# Patient Record
Sex: MALE | Race: WHITE | NOT HISPANIC OR LATINO | Employment: OTHER | URBAN - NONMETROPOLITAN AREA
[De-identification: names, ages, dates, MRNs, and addresses within clinical notes are randomized per-mention and may not be internally consistent; named-entity substitution may affect disease eponyms.]

---

## 2017-01-10 ENCOUNTER — TELEPHONE (OUTPATIENT)
Dept: FAMILY MEDICINE CLINIC | Facility: CLINIC | Age: 67
End: 2017-01-10

## 2017-01-13 RX ORDER — COLCHICINE 0.6 MG/1
TABLET ORAL
Qty: 30 TABLET | Refills: 6 | Status: SHIPPED | OUTPATIENT
Start: 2017-01-13 | End: 2017-01-20 | Stop reason: SDUPTHER

## 2017-01-20 DIAGNOSIS — M10.9 GOUT OF FOOT, UNSPECIFIED CAUSE, UNSPECIFIED CHRONICITY, UNSPECIFIED LATERALITY: Primary | ICD-10-CM

## 2017-01-20 RX ORDER — COLCHICINE 0.6 MG/1
TABLET ORAL
Qty: 30 TABLET | OUTPATIENT
Start: 2017-01-20

## 2017-01-20 RX ORDER — COLCHICINE 0.6 MG/1
0.6 TABLET ORAL DAILY
Qty: 30 TABLET | Refills: 6 | Status: SHIPPED | OUTPATIENT
Start: 2017-01-20 | End: 2017-03-31 | Stop reason: SDUPTHER

## 2017-01-30 RX ORDER — ATORVASTATIN CALCIUM 20 MG/1
TABLET, FILM COATED ORAL
Qty: 30 TABLET | Refills: 0 | Status: SHIPPED | OUTPATIENT
Start: 2017-01-30 | End: 2017-02-27 | Stop reason: SDUPTHER

## 2017-02-02 ENCOUNTER — TELEPHONE (OUTPATIENT)
Dept: ENDOCRINOLOGY | Age: 67
End: 2017-02-02

## 2017-02-02 RX ORDER — LISINOPRIL 10 MG/1
TABLET ORAL
Qty: 30 TABLET | Refills: 0 | Status: SHIPPED | OUTPATIENT
Start: 2017-02-02 | End: 2017-03-06 | Stop reason: SDUPTHER

## 2017-02-02 NOTE — TELEPHONE ENCOUNTER
Called in the pa for onglyza and is approved through 12/31/2017. The pt is aware of this information.

## 2017-02-21 RX ORDER — RIVAROXABAN 20 MG/1
TABLET, FILM COATED ORAL
Qty: 30 TABLET | Refills: 0 | Status: SHIPPED | OUTPATIENT
Start: 2017-02-21 | End: 2017-03-21 | Stop reason: SDUPTHER

## 2017-02-21 RX ORDER — AMLODIPINE BESYLATE 10 MG/1
TABLET ORAL
Qty: 30 TABLET | Refills: 0 | Status: SHIPPED | OUTPATIENT
Start: 2017-02-21 | End: 2017-03-21 | Stop reason: SDUPTHER

## 2017-02-27 RX ORDER — ATORVASTATIN CALCIUM 20 MG/1
TABLET, FILM COATED ORAL
Qty: 30 TABLET | Refills: 0 | Status: SHIPPED | OUTPATIENT
Start: 2017-02-27 | End: 2017-03-31 | Stop reason: SDUPTHER

## 2017-02-28 ENCOUNTER — TELEPHONE (OUTPATIENT)
Dept: FAMILY MEDICINE CLINIC | Facility: CLINIC | Age: 67
End: 2017-02-28

## 2017-02-28 NOTE — TELEPHONE ENCOUNTER
Coco called in regards for patient today. He was taking off his Omeprazole medication by his Nephrologist because it could have affected his kidney function. He has been off of it for about a month now. Patient has been belching a lot more now and she describes him as having a cough pretty much every time he takes a bite of something. They want to know if there is something else that could be given to him for this or if you know of anything he could take? Coco would like a call back at [phone].

## 2017-02-28 NOTE — TELEPHONE ENCOUNTER
I would call his nephrologist and ask him if he knows of a kidney friendly and acid for diabetics with low kidney function.

## 2017-02-28 NOTE — TELEPHONE ENCOUNTER
Coco was informed. She states that she will call and ask him and if she has any troubles she will call back and let us know.

## 2017-03-02 ENCOUNTER — TELEPHONE (OUTPATIENT)
Dept: ENDOCRINOLOGY | Age: 67
End: 2017-03-02

## 2017-03-06 RX ORDER — LISINOPRIL 10 MG/1
TABLET ORAL
Qty: 30 TABLET | Refills: 0 | Status: SHIPPED | OUTPATIENT
Start: 2017-03-06 | End: 2017-03-31 | Stop reason: SDUPTHER

## 2017-03-21 RX ORDER — RIVAROXABAN 20 MG/1
TABLET, FILM COATED ORAL
Qty: 30 TABLET | Refills: 0 | Status: SHIPPED | OUTPATIENT
Start: 2017-03-21 | End: 2017-03-31 | Stop reason: SDUPTHER

## 2017-03-21 RX ORDER — AMLODIPINE BESYLATE 10 MG/1
TABLET ORAL
Qty: 30 TABLET | Refills: 0 | Status: SHIPPED | OUTPATIENT
Start: 2017-03-21 | End: 2017-03-31 | Stop reason: SDUPTHER

## 2017-03-31 ENCOUNTER — TELEPHONE (OUTPATIENT)
Dept: FAMILY MEDICINE CLINIC | Facility: CLINIC | Age: 67
End: 2017-03-31

## 2017-03-31 ENCOUNTER — OFFICE VISIT (OUTPATIENT)
Dept: FAMILY MEDICINE CLINIC | Facility: CLINIC | Age: 67
End: 2017-03-31

## 2017-03-31 VITALS
WEIGHT: 258.2 LBS | DIASTOLIC BLOOD PRESSURE: 54 MMHG | TEMPERATURE: 97.6 F | OXYGEN SATURATION: 96 % | HEIGHT: 70 IN | BODY MASS INDEX: 36.97 KG/M2 | SYSTOLIC BLOOD PRESSURE: 142 MMHG | HEART RATE: 66 BPM

## 2017-03-31 DIAGNOSIS — Z86.718 HISTORY OF DVT (DEEP VEIN THROMBOSIS): ICD-10-CM

## 2017-03-31 DIAGNOSIS — I10 ESSENTIAL HYPERTENSION: ICD-10-CM

## 2017-03-31 DIAGNOSIS — E78.5 HYPERLIPIDEMIA, UNSPECIFIED HYPERLIPIDEMIA TYPE: ICD-10-CM

## 2017-03-31 DIAGNOSIS — Z00.00 HEALTH CARE MAINTENANCE: Primary | ICD-10-CM

## 2017-03-31 DIAGNOSIS — M10.9 GOUT OF FOOT, UNSPECIFIED CAUSE, UNSPECIFIED CHRONICITY, UNSPECIFIED LATERALITY: ICD-10-CM

## 2017-03-31 PROCEDURE — 99213 OFFICE O/P EST LOW 20 MIN: CPT | Performed by: NURSE PRACTITIONER

## 2017-03-31 RX ORDER — AMLODIPINE BESYLATE 10 MG/1
10 TABLET ORAL DAILY
Qty: 30 TABLET | Refills: 6 | Status: SHIPPED | OUTPATIENT
Start: 2017-03-31 | End: 2017-11-20 | Stop reason: SDUPTHER

## 2017-03-31 RX ORDER — COLCHICINE 0.6 MG/1
0.6 TABLET ORAL DAILY
Qty: 30 TABLET | Refills: 6 | Status: SHIPPED | OUTPATIENT
Start: 2017-03-31 | End: 2018-01-23 | Stop reason: SDUPTHER

## 2017-03-31 RX ORDER — GENTAMICIN SULFATE 1 MG/G
CREAM TOPICAL AS NEEDED
COMMUNITY
Start: 2017-02-02 | End: 2017-11-27

## 2017-03-31 RX ORDER — LISINOPRIL 10 MG/1
10 TABLET ORAL DAILY
Qty: 30 TABLET | Refills: 0 | Status: SHIPPED | OUTPATIENT
Start: 2017-03-31 | End: 2017-05-05 | Stop reason: SDUPTHER

## 2017-03-31 RX ORDER — ATORVASTATIN CALCIUM 20 MG/1
20 TABLET, FILM COATED ORAL DAILY
Qty: 30 TABLET | Refills: 6 | Status: SHIPPED | OUTPATIENT
Start: 2017-03-31 | End: 2017-10-27 | Stop reason: SDUPTHER

## 2017-03-31 NOTE — TELEPHONE ENCOUNTER
----- Message from DONNIE Thao sent at 3/31/2017 10:12 AM EDT -----  Please check in all scripts and see when Mr. Torres last colonoscopy was he feels it is within the last 7 years.

## 2017-03-31 NOTE — TELEPHONE ENCOUNTER
----- Message from Bryan Wood sent at 3/31/2017 11:52 AM EDT -----  BEATRICE TITUS PATIENT HAD HIS LAST COLONOSCOPY ON 3/4/2013 @ [facility] BY DR. RAMIREZ.

## 2017-03-31 NOTE — TELEPHONE ENCOUNTER
Do you by chance know around when it was done? I could not find anything in [employer] regarding this.

## 2017-03-31 NOTE — PROGRESS NOTES
Subjective   Javier Torres is a 67 y.o. male. Patient is being seen today for medication check/refill for Atorvastatin, Amlodipine, Colchicine, Lisinopril, and Xarelto. He is not fasting today.     History of Present Illness 67 yr old white male here today to review long term meds for statin for dyslipidemia, amlodipine for blood pressure, colchicine for gout, lisinopril for blood pressure and several toe for anticoagulation therapy for history of DVT. Nephrology discontinued omeprazole in place patient on Pepcid. Patient states all other medications are working well and offers no problems.    The following portions of the patient's history were reviewed and updated as appropriate: allergies, current medications, past family history, past medical history, past social history, past surgical history and problem list.    Review of Systems   Cardiovascular:        Hyperlipidemia.  Hypertension.     Gastrointestinal:        Gout.   Hematological:        DVT.   All other systems reviewed and are negative.      Objective   Physical Exam   Constitutional: He is oriented to person, place, and time. He appears well-developed and well-nourished.   HENT:   Head: Normocephalic and atraumatic.   Eyes: EOM are normal. Pupils are equal, round, and reactive to light.   Neck: Normal range of motion.   Cardiovascular: Normal rate and regular rhythm.    Pulmonary/Chest: Effort normal and breath sounds normal.   Abdominal: Soft. Bowel sounds are normal.   Neurological: He is alert and oriented to person, place, and time.   Skin: Skin is warm and dry.   Psychiatric: He has a normal mood and affect. His behavior is normal. Judgment and thought content normal.   Nursing note and vitals reviewed.      Assessment/Plan   Javier was seen today for med refill and labs only.    Diagnoses and all orders for this visit:    Health care maintenance  -     Hepatitis C Antibody    Gout of foot, unspecified cause, unspecified chronicity, unspecified  laterality  -     colchicine 0.6 MG tablet; Take 1 tablet by mouth Daily.  -     amLODIPine (NORVASC) 10 MG tablet; Take 1 tablet by mouth Daily.  -     aspirin 81 MG tablet; Take 1 tablet by mouth Daily.  -     atorvastatin (LIPITOR) 20 MG tablet; Take 1 tablet by mouth Daily.  -     lisinopril (PRINIVIL,ZESTRIL) 10 MG tablet; Take 1 tablet by mouth Daily.  -     rivaroxaban (XARELTO) 20 MG tablet; Take 1 tablet by mouth Daily With Dinner.    Essential hypertension    Hyperlipidemia, unspecified hyperlipidemia type    History of DVT (deep vein thrombosis)     This office was prepared to draw labs on the patient, but between endocrinology and nephrology all of his routine labs are current. He will be returning to nephrology in May where more labs will be drawn. Reviewed need for vaccines to be given at outside pharmacy for Medicare to cover. Patient aware of need for Medicare adult wellness checkup. Hepatitis C and PSA will be drawn by this office today results will be called once known.

## 2017-04-01 LAB
HCV AB S/CO SERPL IA: 0.1 S/CO RATIO (ref 0–0.9)
PSA SERPL-MCNC: 1.04 NG/ML (ref 0–4)

## 2017-04-13 ENCOUNTER — TELEPHONE (OUTPATIENT)
Dept: FAMILY MEDICINE CLINIC | Facility: CLINIC | Age: 67
End: 2017-04-13

## 2017-04-13 RX ORDER — INSULIN DETEMIR 100 [IU]/ML
INJECTION, SOLUTION SUBCUTANEOUS
Qty: 15 ML | OUTPATIENT
Start: 2017-04-13

## 2017-04-13 NOTE — TELEPHONE ENCOUNTER
Refill request for Levemir Flextouch U-100 was faxed to us from [facility]. Sig: Inject 12 units at bedtime as directed, Quantity: 15, Refills: 0. Do you control? If so, okay to refill?

## 2017-04-13 NOTE — TELEPHONE ENCOUNTER
I have done spoken to Coco about this and she is going to talk to the pharmacy because they should be sending this to his Endocrinologist.

## 2017-04-13 NOTE — TELEPHONE ENCOUNTER
He has an endocrinologist. I do not mind refilling it for one month but he really should be going through endocrinology

## 2017-04-13 NOTE — TELEPHONE ENCOUNTER
Spoke to Coco for patient. She said that the pharmacy should have sent it to his Endocrinologist. She said that she will call them and have them fax it to Endo.

## 2017-04-21 RX ORDER — AMLODIPINE BESYLATE 10 MG/1
TABLET ORAL
Qty: 30 TABLET | OUTPATIENT
Start: 2017-04-21

## 2017-04-21 RX ORDER — SAXAGLIPTIN 2.5 MG/1
TABLET, FILM COATED ORAL
Qty: 30 TABLET | OUTPATIENT
Start: 2017-04-21

## 2017-04-21 RX ORDER — SAXAGLIPTIN 2.5 MG/1
TABLET, FILM COATED ORAL
Qty: 30 TABLET | Refills: 5 | Status: SHIPPED | OUTPATIENT
Start: 2017-04-21 | End: 2017-05-05 | Stop reason: SDUPTHER

## 2017-04-21 RX ORDER — RIVAROXABAN 20 MG/1
TABLET, FILM COATED ORAL
Qty: 30 TABLET | Refills: 0 | Status: SHIPPED | OUTPATIENT
Start: 2017-04-21 | End: 2017-05-05 | Stop reason: SDUPTHER

## 2017-04-26 ENCOUNTER — TELEPHONE (OUTPATIENT)
Dept: FAMILY MEDICINE CLINIC | Facility: CLINIC | Age: 67
End: 2017-04-26

## 2017-04-26 NOTE — TELEPHONE ENCOUNTER
Received a phone call from [facility] stated they take care of his hearing cheeks and they needs a new script for Audiology consult and treat with diagnosis H90.3 code and faxed to [phone], phone # if any questions [phone]

## 2017-05-05 ENCOUNTER — OFFICE VISIT (OUTPATIENT)
Dept: FAMILY MEDICINE CLINIC | Facility: CLINIC | Age: 67
End: 2017-05-05

## 2017-05-05 VITALS
WEIGHT: 257.8 LBS | TEMPERATURE: 97.9 F | HEIGHT: 70 IN | OXYGEN SATURATION: 97 % | DIASTOLIC BLOOD PRESSURE: 58 MMHG | BODY MASS INDEX: 36.91 KG/M2 | HEART RATE: 63 BPM | SYSTOLIC BLOOD PRESSURE: 122 MMHG

## 2017-05-05 DIAGNOSIS — M25.562 ACUTE PAIN OF LEFT KNEE: Primary | ICD-10-CM

## 2017-05-05 DIAGNOSIS — M10.9 GOUT OF FOOT, UNSPECIFIED CAUSE, UNSPECIFIED CHRONICITY, UNSPECIFIED LATERALITY: ICD-10-CM

## 2017-05-05 PROCEDURE — 99214 OFFICE O/P EST MOD 30 MIN: CPT | Performed by: NURSE PRACTITIONER

## 2017-05-05 PROCEDURE — 73560 X-RAY EXAM OF KNEE 1 OR 2: CPT | Performed by: NURSE PRACTITIONER

## 2017-05-05 RX ORDER — LISINOPRIL 10 MG/1
10 TABLET ORAL DAILY
Qty: 30 TABLET | Refills: 6 | Status: SHIPPED | OUTPATIENT
Start: 2017-05-05 | End: 2017-11-27

## 2017-05-05 RX ORDER — CLINDAMYCIN HYDROCHLORIDE 300 MG/1
CAPSULE ORAL
COMMUNITY
Start: 2017-04-13 | End: 2017-05-05

## 2017-05-11 ENCOUNTER — TELEPHONE (OUTPATIENT)
Dept: FAMILY MEDICINE CLINIC | Facility: CLINIC | Age: 67
End: 2017-05-11

## 2017-05-22 RX ORDER — RIVAROXABAN 20 MG/1
TABLET, FILM COATED ORAL
Qty: 30 TABLET | Refills: 2 | Status: SHIPPED | OUTPATIENT
Start: 2017-05-22 | End: 2017-05-23

## 2017-05-23 ENCOUNTER — OFFICE VISIT (OUTPATIENT)
Dept: ENDOCRINOLOGY | Age: 67
End: 2017-05-23

## 2017-05-23 VITALS
SYSTOLIC BLOOD PRESSURE: 142 MMHG | OXYGEN SATURATION: 97 % | HEIGHT: 70 IN | BODY MASS INDEX: 37.25 KG/M2 | HEART RATE: 55 BPM | DIASTOLIC BLOOD PRESSURE: 60 MMHG | WEIGHT: 260.2 LBS

## 2017-05-23 DIAGNOSIS — I10 ESSENTIAL HYPERTENSION: Primary | ICD-10-CM

## 2017-05-23 DIAGNOSIS — E11.21 TYPE 2 DIABETES WITH NEPHROPATHY (HCC): ICD-10-CM

## 2017-05-23 DIAGNOSIS — E78.5 HYPERLIPIDEMIA, UNSPECIFIED HYPERLIPIDEMIA TYPE: ICD-10-CM

## 2017-05-23 DIAGNOSIS — N18.9 CKD (CHRONIC KIDNEY DISEASE), UNSPECIFIED STAGE: ICD-10-CM

## 2017-05-23 DIAGNOSIS — E55.9 VITAMIN D DEFICIENCY: ICD-10-CM

## 2017-05-23 PROCEDURE — 99214 OFFICE O/P EST MOD 30 MIN: CPT | Performed by: INTERNAL MEDICINE

## 2017-05-25 ENCOUNTER — OFFICE VISIT (OUTPATIENT)
Dept: FAMILY MEDICINE CLINIC | Facility: CLINIC | Age: 67
End: 2017-05-25

## 2017-05-25 VITALS
DIASTOLIC BLOOD PRESSURE: 68 MMHG | BODY MASS INDEX: 37.25 KG/M2 | OXYGEN SATURATION: 97 % | TEMPERATURE: 98.6 F | WEIGHT: 260.2 LBS | HEART RATE: 72 BPM | SYSTOLIC BLOOD PRESSURE: 138 MMHG | HEIGHT: 70 IN

## 2017-05-25 DIAGNOSIS — E11.40 TYPE 2 DIABETES MELLITUS WITH DIABETIC NEUROPATHY, WITH LONG-TERM CURRENT USE OF INSULIN (HCC): ICD-10-CM

## 2017-05-25 DIAGNOSIS — F70 MILD INTELLECTUAL DISABILITY: ICD-10-CM

## 2017-05-25 DIAGNOSIS — Z79.4 TYPE 2 DIABETES MELLITUS WITH DIABETIC NEUROPATHY, WITH LONG-TERM CURRENT USE OF INSULIN (HCC): ICD-10-CM

## 2017-05-25 DIAGNOSIS — Z00.00 MEDICARE ANNUAL WELLNESS VISIT, SUBSEQUENT: Primary | ICD-10-CM

## 2017-05-25 LAB
ALBUMIN SERPL-MCNC: 4.4 G/DL (ref 3.5–5.2)
ALBUMIN/GLOB SERPL: 1.7 G/DL
ALP SERPL-CCNC: 52 U/L (ref 39–117)
ALT SERPL-CCNC: 35 U/L (ref 1–41)
AST SERPL-CCNC: 19 U/L (ref 1–40)
BILIRUB SERPL-MCNC: 0.3 MG/DL (ref 0.1–1.2)
BUN SERPL-MCNC: 42 MG/DL (ref 8–23)
BUN/CREAT SERPL: 19.6 (ref 7–25)
C PEPTIDE SERPL-MCNC: 15.9 NG/ML (ref 1.1–4.4)
CALCIUM SERPL-MCNC: 9.9 MG/DL (ref 8.6–10.5)
CHLORIDE SERPL-SCNC: 97 MMOL/L (ref 98–107)
CHOLEST SERPL-MCNC: 175 MG/DL (ref 0–200)
CO2 SERPL-SCNC: 22.8 MMOL/L (ref 22–29)
CREAT SERPL-MCNC: 2.14 MG/DL (ref 0.76–1.27)
EXPIRATION DATE: ABNORMAL
GAD65 AB SER IA-ACNC: <5 U/ML (ref 0–5)
GLOBULIN SER CALC-MCNC: 2.6 GM/DL
GLUCOSE SERPL-MCNC: 289 MG/DL (ref 65–99)
HBA1C MFR BLD: 10.23 % (ref 4.8–5.6)
HBA1C MFR BLD: 11.3 %
HDLC SERPL-MCNC: 38 MG/DL (ref 40–60)
LDLC SERPL CALC-MCNC: 96 MG/DL (ref 0–100)
Lab: 710
POTASSIUM SERPL-SCNC: 5.2 MMOL/L (ref 3.5–5.2)
PROT SERPL-MCNC: 7 G/DL (ref 6–8.5)
SODIUM SERPL-SCNC: 133 MMOL/L (ref 136–145)
TRIGL SERPL-MCNC: 207 MG/DL (ref 0–150)
VLDLC SERPL CALC-MCNC: 41.4 MG/DL (ref 5–40)

## 2017-05-25 PROCEDURE — 83036 HEMOGLOBIN GLYCOSYLATED A1C: CPT | Performed by: NURSE PRACTITIONER

## 2017-05-25 PROCEDURE — G0438 PPPS, INITIAL VISIT: HCPCS | Performed by: NURSE PRACTITIONER

## 2017-05-26 ENCOUNTER — TELEPHONE (OUTPATIENT)
Dept: ENDOCRINOLOGY | Age: 67
End: 2017-05-26

## 2017-05-31 ENCOUNTER — OFFICE VISIT (OUTPATIENT)
Dept: ORTHOPEDIC SURGERY | Facility: CLINIC | Age: 67
End: 2017-05-31

## 2017-05-31 VITALS — BODY MASS INDEX: 36.99 KG/M2 | WEIGHT: 258.4 LBS | HEIGHT: 70 IN | TEMPERATURE: 97.8 F

## 2017-05-31 DIAGNOSIS — M17.12 OSTEOARTHROSIS, LOCALIZED, PRIMARY, KNEE, LEFT: Primary | ICD-10-CM

## 2017-05-31 PROCEDURE — 99204 OFFICE O/P NEW MOD 45 MIN: CPT | Performed by: ORTHOPAEDIC SURGERY

## 2017-05-31 PROCEDURE — 73562 X-RAY EXAM OF KNEE 3: CPT | Performed by: ORTHOPAEDIC SURGERY

## 2017-05-31 PROCEDURE — 20610 DRAIN/INJ JOINT/BURSA W/O US: CPT | Performed by: ORTHOPAEDIC SURGERY

## 2017-05-31 RX ORDER — METHYLPREDNISOLONE ACETATE 80 MG/ML
80 INJECTION, SUSPENSION INTRA-ARTICULAR; INTRALESIONAL; INTRAMUSCULAR; SOFT TISSUE
Status: COMPLETED | OUTPATIENT
Start: 2017-05-31 | End: 2017-05-31

## 2017-05-31 RX ORDER — LIDOCAINE HYDROCHLORIDE 10 MG/ML
2 INJECTION, SOLUTION INFILTRATION; PERINEURAL
Status: COMPLETED | OUTPATIENT
Start: 2017-05-31 | End: 2017-05-31

## 2017-05-31 RX ORDER — BUPIVACAINE HYDROCHLORIDE 5 MG/ML
2 INJECTION, SOLUTION PERINEURAL
Status: COMPLETED | OUTPATIENT
Start: 2017-05-31 | End: 2017-05-31

## 2017-05-31 RX ADMIN — METHYLPREDNISOLONE ACETATE 80 MG: 80 INJECTION, SUSPENSION INTRA-ARTICULAR; INTRALESIONAL; INTRAMUSCULAR; SOFT TISSUE at 08:49

## 2017-05-31 RX ADMIN — LIDOCAINE HYDROCHLORIDE 2 ML: 10 INJECTION, SOLUTION INFILTRATION; PERINEURAL at 08:49

## 2017-05-31 RX ADMIN — BUPIVACAINE HYDROCHLORIDE 2 ML: 5 INJECTION, SOLUTION PERINEURAL at 08:49

## 2017-06-16 RX ORDER — NATEGLINIDE 120 MG/1
TABLET ORAL
Qty: 90 TABLET | Refills: 5 | Status: SHIPPED | OUTPATIENT
Start: 2017-06-16 | End: 2018-01-06 | Stop reason: SDUPTHER

## 2017-06-22 PROBLEM — D36.17 NEUROFIBROMA OF TRUNK: Status: ACTIVE | Noted: 2017-06-22

## 2017-06-27 RX ORDER — SAXAGLIPTIN 2.5 MG/1
TABLET, FILM COATED ORAL
COMMUNITY
Start: 2017-05-25 | End: 2017-06-27 | Stop reason: CLARIF

## 2017-08-18 RX ORDER — COLCHICINE 0.6 MG/1
TABLET ORAL
Qty: 30 TABLET | Refills: 1 | Status: SHIPPED | OUTPATIENT
Start: 2017-08-18 | End: 2017-10-18 | Stop reason: SDUPTHER

## 2017-08-18 RX ORDER — RIVAROXABAN 20 MG/1
TABLET, FILM COATED ORAL
Qty: 30 TABLET | Refills: 1 | Status: SHIPPED | OUTPATIENT
Start: 2017-08-18 | End: 2017-10-18 | Stop reason: SDUPTHER

## 2017-10-18 RX ORDER — SAXAGLIPTIN 2.5 MG/1
TABLET, FILM COATED ORAL
Qty: 30 TABLET | OUTPATIENT
Start: 2017-10-18

## 2017-10-19 RX ORDER — COLCHICINE 0.6 MG/1
TABLET ORAL
Qty: 30 TABLET | Refills: 6 | Status: SHIPPED | OUTPATIENT
Start: 2017-10-19 | End: 2017-11-27 | Stop reason: SDUPTHER

## 2017-10-19 RX ORDER — RIVAROXABAN 20 MG/1
TABLET, FILM COATED ORAL
Qty: 30 TABLET | Refills: 6 | Status: SHIPPED | OUTPATIENT
Start: 2017-10-19 | End: 2017-11-27 | Stop reason: SDUPTHER

## 2017-10-27 DIAGNOSIS — M10.9 GOUT OF FOOT, UNSPECIFIED CAUSE, UNSPECIFIED CHRONICITY, UNSPECIFIED LATERALITY: ICD-10-CM

## 2017-10-27 RX ORDER — ATORVASTATIN CALCIUM 20 MG/1
TABLET, FILM COATED ORAL
Qty: 30 TABLET | Refills: 0 | Status: SHIPPED | OUTPATIENT
Start: 2017-10-27 | End: 2017-11-27 | Stop reason: SDUPTHER

## 2017-11-20 DIAGNOSIS — M10.9 GOUT OF FOOT, UNSPECIFIED CAUSE, UNSPECIFIED CHRONICITY, UNSPECIFIED LATERALITY: ICD-10-CM

## 2017-11-20 RX ORDER — AMLODIPINE BESYLATE 10 MG/1
TABLET ORAL
Qty: 30 TABLET | Refills: 0 | Status: SHIPPED | OUTPATIENT
Start: 2017-11-20 | End: 2017-12-21 | Stop reason: SDUPTHER

## 2017-11-26 DIAGNOSIS — M10.9 GOUT OF FOOT, UNSPECIFIED CAUSE, UNSPECIFIED CHRONICITY, UNSPECIFIED LATERALITY: ICD-10-CM

## 2017-11-27 ENCOUNTER — OFFICE VISIT (OUTPATIENT)
Dept: ENDOCRINOLOGY | Age: 67
End: 2017-11-27

## 2017-11-27 VITALS
BODY MASS INDEX: 37.45 KG/M2 | DIASTOLIC BLOOD PRESSURE: 70 MMHG | HEART RATE: 71 BPM | SYSTOLIC BLOOD PRESSURE: 152 MMHG | HEIGHT: 70 IN | OXYGEN SATURATION: 98 % | WEIGHT: 261.6 LBS

## 2017-11-27 DIAGNOSIS — Z23 NEED FOR IMMUNIZATION AGAINST INFLUENZA: Primary | ICD-10-CM

## 2017-11-27 DIAGNOSIS — M10.9 GOUT OF FOOT, UNSPECIFIED CAUSE, UNSPECIFIED CHRONICITY, UNSPECIFIED LATERALITY: ICD-10-CM

## 2017-11-27 DIAGNOSIS — E11.21 TYPE 2 DIABETES WITH NEPHROPATHY (HCC): Primary | ICD-10-CM

## 2017-11-27 DIAGNOSIS — F70 MILD INTELLECTUAL DISABILITY: ICD-10-CM

## 2017-11-27 DIAGNOSIS — E55.9 VITAMIN D DEFICIENCY: ICD-10-CM

## 2017-11-27 DIAGNOSIS — I10 ESSENTIAL HYPERTENSION: ICD-10-CM

## 2017-11-27 DIAGNOSIS — N18.30 CHRONIC KIDNEY DISEASE, STAGE III (MODERATE) (HCC): ICD-10-CM

## 2017-11-27 DIAGNOSIS — Z23 NEED FOR IMMUNIZATION AGAINST INFLUENZA: ICD-10-CM

## 2017-11-27 DIAGNOSIS — E78.5 DYSLIPIDEMIA: ICD-10-CM

## 2017-11-27 LAB
25(OH)D3+25(OH)D2 SERPL-MCNC: 45.1 NG/ML (ref 30–100)
ALBUMIN SERPL-MCNC: 4.6 G/DL (ref 3.5–5.2)
ALBUMIN/GLOB SERPL: 1.6 G/DL
ALP SERPL-CCNC: 57 U/L (ref 39–117)
ALT SERPL-CCNC: 34 U/L (ref 1–41)
AST SERPL-CCNC: 25 U/L (ref 1–40)
BILIRUB SERPL-MCNC: 0.3 MG/DL (ref 0.1–1.2)
BUN SERPL-MCNC: 34 MG/DL (ref 8–23)
BUN/CREAT SERPL: 17.2 (ref 7–25)
CALCIUM SERPL-MCNC: 9.9 MG/DL (ref 8.6–10.5)
CHLORIDE SERPL-SCNC: 103 MMOL/L (ref 98–107)
CHOLEST SERPL-MCNC: 162 MG/DL (ref 0–200)
CO2 SERPL-SCNC: 23.1 MMOL/L (ref 22–29)
CREAT SERPL-MCNC: 1.98 MG/DL (ref 0.76–1.27)
GFR SERPLBLD CREATININE-BSD FMLA CKD-EPI: 34 ML/MIN/1.73
GFR SERPLBLD CREATININE-BSD FMLA CKD-EPI: 41 ML/MIN/1.73
GLOBULIN SER CALC-MCNC: 2.8 GM/DL
GLUCOSE SERPL-MCNC: 209 MG/DL (ref 65–99)
HBA1C MFR BLD: 9.05 % (ref 4.8–5.6)
HDLC SERPL-MCNC: 42 MG/DL (ref 40–60)
LDLC SERPL CALC-MCNC: 90 MG/DL (ref 0–100)
POTASSIUM SERPL-SCNC: 5 MMOL/L (ref 3.5–5.2)
PROT SERPL-MCNC: 7.4 G/DL (ref 6–8.5)
SODIUM SERPL-SCNC: 140 MMOL/L (ref 136–145)
T4 FREE SERPL-MCNC: 1.04 NG/DL (ref 0.93–1.7)
TRIGL SERPL-MCNC: 151 MG/DL (ref 0–150)
TSH SERPL DL<=0.005 MIU/L-ACNC: 3.07 MIU/ML (ref 0.27–4.2)
VLDLC SERPL CALC-MCNC: 30.2 MG/DL (ref 5–40)

## 2017-11-27 PROCEDURE — 99214 OFFICE O/P EST MOD 30 MIN: CPT | Performed by: INTERNAL MEDICINE

## 2017-11-27 PROCEDURE — G0008 ADMIN INFLUENZA VIRUS VAC: HCPCS | Performed by: INTERNAL MEDICINE

## 2017-11-27 PROCEDURE — 90662 IIV NO PRSV INCREASED AG IM: CPT | Performed by: INTERNAL MEDICINE

## 2017-11-27 RX ORDER — SAXAGLIPTIN 2.5 MG/1
TABLET, FILM COATED ORAL
COMMUNITY
Start: 2017-09-21 | End: 2017-11-27

## 2017-11-27 RX ORDER — ATORVASTATIN CALCIUM 20 MG/1
TABLET, FILM COATED ORAL
Qty: 30 TABLET | Refills: 0 | Status: CANCELLED | OUTPATIENT
Start: 2017-11-27

## 2017-11-27 RX ORDER — ATORVASTATIN CALCIUM 20 MG/1
20 TABLET, FILM COATED ORAL NIGHTLY
Qty: 30 TABLET | Refills: 0 | Status: SHIPPED | OUTPATIENT
Start: 2017-11-27 | End: 2017-12-27 | Stop reason: SDUPTHER

## 2017-11-27 RX ORDER — MELATONIN 10 MG
1 TABLET, SUBLINGUAL SUBLINGUAL
COMMUNITY
End: 2019-10-21 | Stop reason: DRUGHIGH

## 2017-11-27 RX ORDER — RANITIDINE 150 MG/1
150 TABLET ORAL NIGHTLY
COMMUNITY
End: 2018-01-23 | Stop reason: SDUPTHER

## 2017-11-27 RX ORDER — LISINOPRIL 20 MG/1
20 TABLET ORAL DAILY
Qty: 30 TABLET | Refills: 5 | Status: SHIPPED | OUTPATIENT
Start: 2017-11-27 | End: 2018-07-02 | Stop reason: ALTCHOICE

## 2017-11-27 NOTE — PROGRESS NOTES
Subjective   Javier Torres is a 67 y.o. male.     HPI Comments: F/u for dm2, vitamin d def, chronic kidney disease, hyperlipidemia, hypertension / testing bs 3 x day / last dm eye exam 7/14/16 with dr Ludwig / last dm foot exam 5/23/17 with dr Meyers     Diabetes     Chronic Kidney Disease   Associated symptoms include joint swelling (knees).   Hyperlipidemia     Hypertension          Patient has known diabetes mellitus for many years. He was started on insulin in 2014. He has been on Levemir 20 units at bedtime, Starlix 120 mg 3 times a day with meals, and Januvia 50 mg/day. Fasting blood sugar runs 168-275.  Lunchtime blood sugar runs between 154-283.  Suppertime blood sugars runs between 191-303.  He eats out with family 2-3 times a week. He normally eats lunch 3 days a week at a [employer] center. He eats breakfast and supper at home. He denies any severe hypoglycemic episodes.  He has gained 3 pounds since 5/17.    He lives by himself and a cousin lives across the street. His last meal was last night.      His last eye examination was in 2017.   He has no history of retinopathy. He has chronic kidney disease and vitamin D deficiency and last saw Dr. Rubin in 2017.  He is on vitamin D 1000 units daily.  He denies numbness, tingling, or burning sensation in his feet.      He has hyperlipidemia and has been on Lipitor 20 mg once a day. He denies any myalgia. He has no history of thyroid disease.       He has hypertension and is on Norvasc 10 mg once a day and lisinopril 10 mg once a day. He has no previous history of heart attack or stroke.  He denies chest pain, shortness of breath or pedal edema.  Blood pressure at home runs between 119-151 systolic.      The following portions of the patient's history were reviewed and updated as appropriate: allergies, current medications, past family history, past medical history, past social history, past surgical history and problem list.    Review of Systems  "  Constitutional: Negative.    HENT: Negative.    Eyes: Negative.    Respiratory: Negative.    Cardiovascular: Negative.    Endocrine: Negative.    Genitourinary: Positive for frequency.   Musculoskeletal: Positive for joint swelling (knees).   Skin: Negative.    Allergic/Immunologic: Negative.    Neurological: Negative.    Hematological: Negative.    Psychiatric/Behavioral: Positive for sleep disturbance. Self-injury: sleep apnea on c pap machine        Objective      Vitals:    11/27/17 0854   BP: 152/70   BP Location: Left arm   Patient Position: Sitting   Cuff Size: Large Adult   Pulse: 71   SpO2: 98%   Weight: 261 lb 9.6 oz (119 kg)   Height: 70\" (177.8 cm)     Physical Exam   Constitutional: He is oriented to person, place, and time. He appears well-developed and well-nourished. No distress.   HENT:   Head: Normocephalic.   Nose: Nose normal.   Mouth/Throat: No oropharyngeal exudate.   Eyes: Conjunctivae and EOM are normal. Right eye exhibits no discharge. Left eye exhibits no discharge. No scleral icterus.   Neck: Neck supple. No JVD present. No tracheal deviation present. No thyromegaly present.   Cardiovascular: Normal rate, regular rhythm, normal heart sounds and intact distal pulses.  Exam reveals no gallop and no friction rub.    No murmur heard.  Pulmonary/Chest: Effort normal and breath sounds normal. No respiratory distress. He has no wheezes. He has no rales. He exhibits no tenderness.   Abdominal: Soft. Bowel sounds are normal. He exhibits no distension and no mass. There is no tenderness.   Musculoskeletal: Normal range of motion. He exhibits no edema, tenderness or deformity.   Lymphadenopathy:     He has no cervical adenopathy.   Neurological: He is alert and oriented to person, place, and time. He has normal reflexes. He displays normal reflexes. Coordination normal.   Intact light touch   Skin: Skin is warm and dry. No rash noted. No erythema. No pallor.   Psychiatric: He has a normal mood and " affect. His behavior is normal.     Office Visit on 05/25/2017   Component Date Value Ref Range Status   • Hemoglobin A1C 05/25/2017 11.3  % Final   • Lot Number 05/25/2017 [id number]   Final   • Expiration Date 05/25/2017 3/2019   Final     Assessment/Plan   Javier was seen today for diabetes, vitamin d deficiency, chronic kidney disease, hyperlipidemia, hypertension and sleep apnea.    Diagnoses and all orders for this visit:    Type 2 diabetes with nephropathy  -     Comprehensive Metabolic Panel  -     Hemoglobin A1c  -     TSH  -     T4, Free  -     insulin detemir (LEVEMIR FLEXTOUCH) 100 UNIT/ML injection; Inject 30 Units under the skin Every Night.    Vitamin D deficiency  -     Vitamin D 25 Hydroxy    Chronic kidney disease, stage III (moderate)  -     Vitamin D 25 Hydroxy    Dyslipidemia  -     Lipid Panel  -     TSH  -     T4, Free    Mild intellectual disability    Gout of foot, unspecified cause, unspecified chronicity, unspecified laterality  -     lisinopril (PRINIVIL,ZESTRIL) 20 MG tablet; Take 1 tablet by mouth Daily.       Increase Levemir to 30 units at bedtime.  Continue Starlix 120 mg 3 times a day with meals and Januvia 50 mg once a day.  Continue vitamin D3 1000 units per day.  Continue Lipitor 20 mg once a day.  Increase lisinopril to 20 mg once a day.  Continue amlodipine 10 mg once a day.  Continue BP checks at home.  Will let Dr. Rubin and PCP adjust blood pressure medication further.  Flu vaccine given today.    Send copy of my notes and labs to Dr. Rubin and Carrie Chiang NP.    RTC 6 mos.

## 2017-11-27 NOTE — TELEPHONE ENCOUNTER
I filled for one month. Patient needs to come in and have labs drawn. And he has tolerated using colchicine and atorvastatin in the past. As long as he stays well hydrated he does not have any problems.

## 2017-12-21 DIAGNOSIS — M10.9 GOUT OF FOOT, UNSPECIFIED CAUSE, UNSPECIFIED CHRONICITY, UNSPECIFIED LATERALITY: ICD-10-CM

## 2017-12-21 RX ORDER — AMLODIPINE BESYLATE 10 MG/1
TABLET ORAL
Qty: 30 TABLET | Refills: 0 | Status: SHIPPED | OUTPATIENT
Start: 2017-12-21 | End: 2018-01-18 | Stop reason: SDUPTHER

## 2017-12-27 DIAGNOSIS — M10.9 GOUT OF FOOT, UNSPECIFIED CAUSE, UNSPECIFIED CHRONICITY, UNSPECIFIED LATERALITY: ICD-10-CM

## 2017-12-27 RX ORDER — ATORVASTATIN CALCIUM 20 MG/1
TABLET, FILM COATED ORAL
Qty: 30 TABLET | Refills: 0 | Status: SHIPPED | OUTPATIENT
Start: 2017-12-27 | End: 2018-01-23 | Stop reason: SDUPTHER

## 2017-12-28 RX ORDER — CALCIUM CITRATE/VITAMIN D3 200MG-6.25
TABLET ORAL
Qty: 100 EACH | Refills: 5 | Status: SHIPPED | OUTPATIENT
Start: 2017-12-28 | End: 2018-07-13 | Stop reason: SDUPTHER

## 2017-12-28 RX ORDER — LANCING DEVICE
EACH MISCELLANEOUS
Qty: 100 EACH | Refills: 5 | Status: SHIPPED | OUTPATIENT
Start: 2017-12-28

## 2018-01-08 RX ORDER — NATEGLINIDE 120 MG/1
TABLET ORAL
Qty: 90 TABLET | Refills: 5 | Status: SHIPPED | OUTPATIENT
Start: 2018-01-08 | End: 2018-07-09 | Stop reason: SDUPTHER

## 2018-01-18 DIAGNOSIS — M10.9 GOUT OF FOOT, UNSPECIFIED CAUSE, UNSPECIFIED CHRONICITY, UNSPECIFIED LATERALITY: ICD-10-CM

## 2018-01-18 RX ORDER — AMLODIPINE BESYLATE 10 MG/1
TABLET ORAL
Qty: 30 TABLET | Refills: 0 | Status: SHIPPED | OUTPATIENT
Start: 2018-01-18 | End: 2018-01-23 | Stop reason: SDUPTHER

## 2018-01-23 ENCOUNTER — OFFICE VISIT (OUTPATIENT)
Dept: FAMILY MEDICINE CLINIC | Facility: CLINIC | Age: 68
End: 2018-01-23

## 2018-01-23 VITALS
BODY MASS INDEX: 37.05 KG/M2 | HEART RATE: 70 BPM | DIASTOLIC BLOOD PRESSURE: 60 MMHG | TEMPERATURE: 97.8 F | OXYGEN SATURATION: 97 % | HEIGHT: 70 IN | SYSTOLIC BLOOD PRESSURE: 118 MMHG | WEIGHT: 258.8 LBS

## 2018-01-23 DIAGNOSIS — M10.9 GOUT OF FOOT, UNSPECIFIED CAUSE, UNSPECIFIED CHRONICITY, UNSPECIFIED LATERALITY: ICD-10-CM

## 2018-01-23 DIAGNOSIS — E78.5 HYPERLIPIDEMIA, UNSPECIFIED HYPERLIPIDEMIA TYPE: ICD-10-CM

## 2018-01-23 DIAGNOSIS — I10 ESSENTIAL HYPERTENSION: ICD-10-CM

## 2018-01-23 DIAGNOSIS — F70 MILD INTELLECTUAL DISABILITY: ICD-10-CM

## 2018-01-23 DIAGNOSIS — K21.9 GASTROESOPHAGEAL REFLUX DISEASE, ESOPHAGITIS PRESENCE NOT SPECIFIED: Primary | ICD-10-CM

## 2018-01-23 DIAGNOSIS — E11.40 TYPE 2 DIABETES MELLITUS WITH DIABETIC NEUROPATHY, WITHOUT LONG-TERM CURRENT USE OF INSULIN (HCC): ICD-10-CM

## 2018-01-23 PROBLEM — M25.562 ACUTE PAIN OF LEFT KNEE: Status: RESOLVED | Noted: 2017-05-05 | Resolved: 2018-01-23

## 2018-01-23 LAB
POC CREATININE URINE: NORMAL
POC MICROALBUMIN URINE: NORMAL

## 2018-01-23 PROCEDURE — 99213 OFFICE O/P EST LOW 20 MIN: CPT | Performed by: NURSE PRACTITIONER

## 2018-01-23 PROCEDURE — 82044 UR ALBUMIN SEMIQUANTITATIVE: CPT | Performed by: NURSE PRACTITIONER

## 2018-01-23 RX ORDER — COLCHICINE 0.6 MG/1
0.6 TABLET ORAL DAILY
Qty: 30 TABLET | Refills: 6 | Status: SHIPPED | OUTPATIENT
Start: 2018-01-23 | End: 2018-05-21 | Stop reason: SDUPTHER

## 2018-01-23 RX ORDER — ATORVASTATIN CALCIUM 20 MG/1
20 TABLET, FILM COATED ORAL NIGHTLY
Qty: 30 TABLET | Refills: 6 | Status: SHIPPED | OUTPATIENT
Start: 2018-01-23 | End: 2018-08-24 | Stop reason: SDUPTHER

## 2018-01-23 RX ORDER — RANITIDINE 150 MG/1
150 TABLET ORAL NIGHTLY
Qty: 30 TABLET | Refills: 6 | Status: SHIPPED | OUTPATIENT
Start: 2018-01-23 | End: 2018-10-17 | Stop reason: SDUPTHER

## 2018-01-23 RX ORDER — AMLODIPINE BESYLATE 10 MG/1
10 TABLET ORAL DAILY
Qty: 30 TABLET | Refills: 6 | Status: SHIPPED | OUTPATIENT
Start: 2018-01-23 | End: 2018-05-21 | Stop reason: SDUPTHER

## 2018-01-23 NOTE — PROGRESS NOTES
Subjective   Javier Torres is a 67 y.o. male. Patient is being seen today for medication check/refill for Gout, Hypertension, and Dyslipidemia. He is fasting. Pt does see an Endocrinologist.     History of Present Illness 67 yr old white male to discuss long-term use of gout medicine which is colchicine 0.6 mg tablets, hypertension treated with Norvasc 10 mg tablet, aspirin 81 mg tablet and lisinopril 20 mg tablet daily. Dyslipidemia is treated with atorvastatin 20 mg. Patient sees an endocrinologist for all of his diabetic needs. After reading endocrinology note was happy to acknowledge that Javier is meeting his goal    The following portions of the patient's history were reviewed and updated as appropriate: allergies, current medications, past family history, past medical history, past social history, past surgical history and problem list.    Review of Systems   Cardiovascular:        Hypertension.  Dyslipidemia.    Gastrointestinal:        Gout.    All other systems reviewed and are negative.      Objective   Physical Exam   Constitutional: He is oriented to person, place, and time. He appears well-developed and well-nourished.   Has lost 3 pounds since November 2017 currently weighing 258 pounds with a BMI of 37.1.   HENT:   Head: Normocephalic and atraumatic.   Wears hearing aides both ears.   Eyes: EOM are normal. Pupils are equal, round, and reactive to light.   Neck: Normal range of motion. Neck supple.   Cardiovascular: Normal rate and regular rhythm.    Pulmonary/Chest: Effort normal and breath sounds normal.   Abdominal: Soft. Bowel sounds are normal.   Musculoskeletal: Normal range of motion.   Neurological: He is alert and oriented to person, place, and time.   Skin: Skin is warm and dry.   Psychiatric: He has a normal mood and affect. His behavior is normal. Judgment and thought content normal.   Nursing note and vitals reviewed.      Assessment/Plan   Javier was seen today for med refill and labs  only.    Diagnoses and all orders for this visit:    Gastroesophageal reflux disease, esophagitis presence not specified  -     raNITIdine (ZANTAC) 150 MG tablet; Take 1 tablet by mouth Every Night.    Gout of foot, unspecified cause, unspecified chronicity, unspecified laterality  -     amLODIPine (NORVASC) 10 MG tablet; Take 1 tablet by mouth Daily.  -     atorvastatin (LIPITOR) 20 MG tablet; Take 1 tablet by mouth Every Night.  -     colchicine 0.6 MG tablet; Take 1 tablet by mouth Daily.  -     rivaroxaban (XARELTO) 20 MG tablet; Take 1 tablet by mouth Daily With Dinner.    Hyperlipidemia, unspecified hyperlipidemia type    Essential hypertension    Mild intellectual disability     Per health maintenance record patient is due for a Tdap and diabetic eye exam. Urine microalbumin shows do, patient has known kidney disease grade 3 and reads abnormal on a regular basis. In office urine microalbumin read as  Normal today. Patient informed of need for T dap and diabetic eye exam Will leave voicemail for Coco to help him schedule these.

## 2018-01-24 DIAGNOSIS — M10.9 GOUT OF FOOT, UNSPECIFIED CAUSE, UNSPECIFIED CHRONICITY, UNSPECIFIED LATERALITY: ICD-10-CM

## 2018-01-25 RX ORDER — ATORVASTATIN CALCIUM 20 MG/1
TABLET, FILM COATED ORAL
Qty: 30 TABLET | OUTPATIENT
Start: 2018-01-25

## 2018-02-19 DIAGNOSIS — M10.9 GOUT OF FOOT, UNSPECIFIED CAUSE, UNSPECIFIED CHRONICITY, UNSPECIFIED LATERALITY: ICD-10-CM

## 2018-02-19 RX ORDER — AMLODIPINE BESYLATE 10 MG/1
TABLET ORAL
Qty: 30 TABLET | Refills: 2 | Status: SHIPPED | OUTPATIENT
Start: 2018-02-19 | End: 2018-05-07 | Stop reason: SDUPTHER

## 2018-03-09 ENCOUNTER — OFFICE VISIT (OUTPATIENT)
Dept: FAMILY MEDICINE CLINIC | Facility: CLINIC | Age: 68
End: 2018-03-09

## 2018-03-09 VITALS
WEIGHT: 255.4 LBS | HEIGHT: 70 IN | OXYGEN SATURATION: 98 % | SYSTOLIC BLOOD PRESSURE: 156 MMHG | DIASTOLIC BLOOD PRESSURE: 80 MMHG | BODY MASS INDEX: 36.56 KG/M2 | TEMPERATURE: 98.5 F | HEART RATE: 77 BPM

## 2018-03-09 DIAGNOSIS — M25.571 ACUTE RIGHT ANKLE PAIN: Primary | ICD-10-CM

## 2018-03-09 PROCEDURE — 73610 X-RAY EXAM OF ANKLE: CPT | Performed by: NURSE PRACTITIONER

## 2018-03-09 PROCEDURE — 99214 OFFICE O/P EST MOD 30 MIN: CPT | Performed by: NURSE PRACTITIONER

## 2018-03-09 NOTE — PROGRESS NOTES
Subjective   Javier Torres is a 68 y.o. male. Patient is being seen today for ankle pain. The pain is on the right ankle. He states that it has been going on for a couple days and he is not sure why the pain started.     History of Present Illness 58-year-old  male presents to the office today being seen for ankle pain in his right ankle states that it began suddenly a couple days ago and is not sure why he has not had any accident that he is aware of. He describes the pain as aching. Has treated with Tylenol. Walking makes it feel worse resting it and elevating it makes it feel better. On a 1-10 scale rates his pain as  3-4. Patient labs not due until June 2018.    The following portions of the patient's history were reviewed and updated as appropriate: allergies, current medications, past family history, past medical history, past social history, past surgical history and problem list.    Review of Systems   Musculoskeletal:        Ankle pain.    All other systems reviewed and are negative.      Objective   Physical Exam   Constitutional: He is oriented to person, place, and time. He appears well-developed and well-nourished.   HENT:   Head: Normocephalic and atraumatic.   Eyes: EOM are normal. Pupils are equal, round, and reactive to light.   Glasses   Neck: Normal range of motion. Neck supple.   Cardiovascular: Normal rate and regular rhythm.    Pulmonary/Chest: Effort normal and breath sounds normal.   Abdominal: Soft. Bowel sounds are normal.   Musculoskeletal: He exhibits edema and tenderness.   Pt walking with cane in lt  Hand. Removed shoe and sock to find an elastic ankle brace in use. Brace extremely tight for patient's body size. Removed the brace the area underneath was not edematous but patient had +1 pitting edema from the forefoot to toes and from the top of the ankle to mid shin. Advised patient not to wear that brace. If he could find a slightly larger one that did not cut off blood flow  or if he wanted to use an Ace wrap that would be more appropriate. Patient has +2 pulses in dorsalis pedis, skin is warm patient able to move toes. Has full range of motion of ankle with increased pain when he rotates his foot clockwise. Tenderness noted over the lateral aspect of the ankle.   Neurological: He is alert and oriented to person, place, and time.   Skin: Skin is warm and dry.   Psychiatric: His behavior is normal. Thought content normal.   Mildly mentally challenged. Response time delay. Speech is becoming more difficult to understand as patient ages   Nursing note and vitals reviewed.      Assessment/Plan   Javier was seen today for ankle pain.    Diagnoses and all orders for this visit:    Acute right ankle pain  -     XR Ankle 3+ View Right (In Office)      Advised patient to keep elastic ankle brace off his foot, unless he could buy a larger size that did not cut off blood flow to his toes and shin. Told patient to elevate his leg. May apply ice 15-20 minutes off and on throughout the day. Follow-up on Monday if not improving. In office x-ray done to rule out any fractures that might be causing him pain. No films available for comparison. Will send to radiology for formal over reading. In office film read as right foot great toe hammertoe, patient has a heel spur, moderate soft tissue swelling throughout the foot and mild osteoarthritic changes due to age. No abrupt fractures or misalignment found . Will treat discomfort with Tylenol. Patient Leg elevated for approximately 10 minutes in the office without the elastic brace and the edema immediately began to go down his calf softened. And reinforce need to keep foot elevated and if he chooses to apply brace that it needs to be large enough to encompass his foot without cutting off circulation.

## 2018-04-16 RX ORDER — SITAGLIPTIN 50 MG/1
50 TABLET, FILM COATED ORAL DAILY
Qty: 90 TABLET | Refills: 0 | Status: SHIPPED | OUTPATIENT
Start: 2018-04-16 | End: 2018-07-14 | Stop reason: SDUPTHER

## 2018-04-16 NOTE — TELEPHONE ENCOUNTER
----- Message from Nona Orozco sent at 4/16/2018  1:57 PM EDT -----  Contact: PATIENT   HEIDI TRONCOSO , HAS CALLED IN REGARDS TO THE PATIENT PAPER WORK FOR HIM TO GET DIABETIC SHOES. SHE IS ASKING FOR A CALL BACK IN REGARDS TO THIS   PHONE:   [phone]

## 2018-05-07 ENCOUNTER — OFFICE VISIT (OUTPATIENT)
Dept: FAMILY MEDICINE CLINIC | Facility: CLINIC | Age: 68
End: 2018-05-07

## 2018-05-07 VITALS
TEMPERATURE: 98.1 F | WEIGHT: 250.4 LBS | RESPIRATION RATE: 16 BRPM | SYSTOLIC BLOOD PRESSURE: 138 MMHG | BODY MASS INDEX: 35.85 KG/M2 | HEIGHT: 70 IN | DIASTOLIC BLOOD PRESSURE: 66 MMHG | HEART RATE: 58 BPM | OXYGEN SATURATION: 96 %

## 2018-05-07 DIAGNOSIS — R09.81 HEAD CONGESTION: Primary | ICD-10-CM

## 2018-05-07 PROCEDURE — 99213 OFFICE O/P EST LOW 20 MIN: CPT | Performed by: NURSE PRACTITIONER

## 2018-05-07 RX ORDER — CLINDAMYCIN HYDROCHLORIDE 300 MG/1
CAPSULE ORAL
COMMUNITY
Start: 2018-03-02 | End: 2018-07-02

## 2018-05-07 RX ORDER — FLUTICASONE PROPIONATE 50 MCG
2 SPRAY, SUSPENSION (ML) NASAL DAILY
Qty: 18.2 ML | Refills: 3 | Status: SHIPPED | OUTPATIENT
Start: 2018-05-07 | End: 2018-08-09

## 2018-05-07 NOTE — PROGRESS NOTES
Subjective   Javier Torres is a 68 y.o. male. Patient is being seen today for headache and earache. The earache is located in right ear. His symptoms have been going on for several days. He states that the headache will go down into his neck.     History of Present Illness 68 yr old white male here with C/O HA on the right side of his head and rt ear achy that began suddenly and has been continuous for several days. He should not treated headache and ear discomfort with Tylenol which helps for a few hours. Nothing makes it go away completely, nothing makes it worse. On a 1-10 scale rates his discomfort as a 4 for his headache    The following portions of the patient's history were reviewed and updated as appropriate: allergies, current medications, past family history, past medical history, past social history, past surgical history and problem list.    Review of Systems   HENT: Positive for ear pain.    Neurological: Positive for headaches.   All other systems reviewed and are negative.      Objective   Physical Exam   Constitutional: He is oriented to person, place, and time. He appears well-developed and well-nourished.   HENT:   Head: Normocephalic and atraumatic.   Bilateral tympanic membranes appear full, PERRL gray with good cone of light very little ear wax in ER. Nasal membranes red swollen and dry. Oropharynx and throat normal   Eyes: EOM are normal. Pupils are equal, round, and reactive to light.   Neck: Normal range of motion. Neck supple.   Cardiovascular: Normal rate and regular rhythm.    Pulmonary/Chest: Effort normal and breath sounds normal.   Abdominal: Soft. Bowel sounds are normal. He exhibits distension.   Musculoskeletal:   Normal for age   Neurological: He is alert and oriented to person, place, and time.   Skin: Skin is warm and dry. Capillary refill takes less than 2 seconds.   Psychiatric: He has a normal mood and affect. His behavior is normal. Judgment and thought content normal.    Nursing note and vitals reviewed.      Assessment/Plan   Javier was seen today for headache and earache.    Diagnoses and all orders for this visit:    Head congestion  -     fluticasone (FLONASE) 50 MCG/ACT nasal spray; 2 sprays into each nostril Daily.    Will treat head congestion with Flonase 2 sprays in each nostril daily. Patient will continue to eat a low-fat high-fiber no added sugar diet drinking 6-8 glasses of water a day and exercising 30 minutes a day by taking a brisk walk. This will help maintain good bone density muscle mass heart health and has been known to elevate the psyche. Javier has lost 5 pounds since his last visit, good for Javier and has got a new pair of Walking shoes that he needs to break in. He will follow-up in a week if not improving and sooner if worse. Continue to use Tylenol for his headache as needed. Hopefully the Flonase will decrease the pressure in his years which will help decrease the headache that he has.

## 2018-05-21 DIAGNOSIS — M10.9 GOUT OF FOOT, UNSPECIFIED CAUSE, UNSPECIFIED CHRONICITY, UNSPECIFIED LATERALITY: ICD-10-CM

## 2018-05-21 RX ORDER — AMLODIPINE BESYLATE 10 MG/1
TABLET ORAL
Qty: 30 TABLET | OUTPATIENT
Start: 2018-05-21

## 2018-05-21 RX ORDER — COLCHICINE 0.6 MG/1
TABLET ORAL
Qty: 30 TABLET | OUTPATIENT
Start: 2018-05-21

## 2018-05-21 RX ORDER — AMLODIPINE BESYLATE 10 MG/1
10 TABLET ORAL DAILY
Qty: 30 TABLET | Refills: 6 | Status: SHIPPED | OUTPATIENT
Start: 2018-05-21 | End: 2018-10-30 | Stop reason: SDUPTHER

## 2018-05-21 RX ORDER — COLCHICINE 0.6 MG/1
0.6 TABLET ORAL DAILY
Qty: 30 TABLET | Refills: 6 | Status: SHIPPED | OUTPATIENT
Start: 2018-05-21 | End: 2018-10-30 | Stop reason: SDUPTHER

## 2018-07-02 ENCOUNTER — OFFICE VISIT (OUTPATIENT)
Dept: ENDOCRINOLOGY | Age: 68
End: 2018-07-02

## 2018-07-02 VITALS
HEIGHT: 70 IN | OXYGEN SATURATION: 97 % | SYSTOLIC BLOOD PRESSURE: 166 MMHG | DIASTOLIC BLOOD PRESSURE: 72 MMHG | WEIGHT: 247.6 LBS | BODY MASS INDEX: 35.45 KG/M2 | HEART RATE: 60 BPM

## 2018-07-02 DIAGNOSIS — F70 MILD INTELLECTUAL DISABILITY: ICD-10-CM

## 2018-07-02 DIAGNOSIS — E78.5 HYPERLIPIDEMIA, UNSPECIFIED HYPERLIPIDEMIA TYPE: ICD-10-CM

## 2018-07-02 DIAGNOSIS — E11.21 TYPE 2 DIABETES WITH NEPHROPATHY (HCC): Primary | ICD-10-CM

## 2018-07-02 DIAGNOSIS — E11.40 TYPE 2 DIABETES MELLITUS WITH DIABETIC NEUROPATHY, WITH LONG-TERM CURRENT USE OF INSULIN (HCC): ICD-10-CM

## 2018-07-02 DIAGNOSIS — E55.9 VITAMIN D DEFICIENCY: ICD-10-CM

## 2018-07-02 DIAGNOSIS — I10 ESSENTIAL HYPERTENSION: ICD-10-CM

## 2018-07-02 DIAGNOSIS — Z79.4 TYPE 2 DIABETES MELLITUS WITH DIABETIC NEUROPATHY, WITH LONG-TERM CURRENT USE OF INSULIN (HCC): ICD-10-CM

## 2018-07-02 PROCEDURE — 99214 OFFICE O/P EST MOD 30 MIN: CPT | Performed by: INTERNAL MEDICINE

## 2018-07-02 NOTE — PROGRESS NOTES
Subjective   Javier Torres is a 68 y.o. male.     F/u for dm 2,hypertension,hyperlipidemia, CKD,vitamin d def/ testing bs 3 x day/ last dm eye exam 7/14/16 with dr Ludwig/ last dm foot exam today with dr Meyers       Diabetes     Hyperlipidemia     Hypertension     Chronic Kidney Disease           Patient has known diabetes mellitus for many years. He was started on insulin in 2014. He has been on Levemir 30 units at bedtime, Starlix 120 mg 3 times a day with meals, and Januvia 50 mg/day. Fasting blood sugar runs 168-275.  Lunchtime blood sugar runs between 151-394.  Suppertime blood sugars runs between 195-441.  He eats out with family 2-3 times a week. He normally eats lunch 3 days a week at a senior center. He eats breakfast and supper at home. He denies any severe hypoglycemic episodes.  He has lost 14 pounds since 11/17.    He lives by himself and a cousin lives across the street. His last meal was last night.      His last eye examination was in 2017.   He has no history of retinopathy. He has chronic kidney disease and vitamin D deficiency and last saw Dr. Rubin in 2017.  He is on vitamin D 1000 units daily.  He denies numbness, tingling, or burning sensation in his feet.      He has hyperlipidemia and has been on Lipitor 20 mg once a day. He denies any myalgia. He has no history of thyroid disease.       He has hypertension and is on Norvasc 10 mg once a day.  He was taken off lisinopril but does not know the reason.  He has no previous history of heart attack or stroke.  He denies chest pain, shortness of breath or pedal edema.  Blood pressure at home runs between 130-181 systolic.     The following portions of the patient's history were reviewed and updated as appropriate: allergies, current medications, past family history, past medical history, past social history, past surgical history and problem list.    Review of Systems   Constitutional: Negative.    HENT: Negative.    Eyes: Negative.   "  Respiratory: Negative.    Cardiovascular: Negative.    Gastrointestinal: Negative.    Endocrine: Negative.    Genitourinary: Negative.    Musculoskeletal: Negative.    Skin: Negative.    Allergic/Immunologic: Negative.    Neurological: Negative.    Psychiatric/Behavioral: Negative.        Objective      Vitals:    07/02/18 1118   BP: 166/72   BP Location: Left arm   Patient Position: Sitting   Cuff Size: Large Adult   Pulse: 60   SpO2: 97%   Weight: 112 kg (247 lb 9.6 oz)   Height: 177.8 cm (70\")     Physical Exam   Constitutional: He is oriented to person, place, and time. He appears well-developed and well-nourished. No distress.   HENT:   Head: Normocephalic.   Nose: Nose normal.   Mouth/Throat: No oropharyngeal exudate.   Eyes: Conjunctivae and EOM are normal. Right eye exhibits no discharge. Left eye exhibits no discharge. No scleral icterus.   Neck: Neck supple. No JVD present. No tracheal deviation present. No thyromegaly present.   Cardiovascular: Normal rate, regular rhythm, normal heart sounds and intact distal pulses.  Exam reveals no friction rub.    No murmur heard.  Pulmonary/Chest: Effort normal and breath sounds normal. No respiratory distress. He has no wheezes. He has no rales.   Abdominal: Soft. Bowel sounds are normal. He exhibits no distension and no mass. There is no tenderness. There is no guarding.   Musculoskeletal: Normal range of motion. He exhibits edema (+1 edema). He exhibits no tenderness or deformity.   Chronic venous stasis changes in distal lower ext   Lymphadenopathy:     He has no cervical adenopathy.   Neurological: He is alert and oriented to person, place, and time. He displays normal reflexes.   Intact light touch on distal lower ext   Skin: Skin is warm and dry. No rash noted. No erythema.   Psychiatric: He has a normal mood and affect. His behavior is normal.     Office Visit on 01/23/2018   Component Date Value Ref Range Status   • Microalbumin, Urine 01/23/2018 10 mg/L   " Final    A:C= 30 mg/g    • Creatinine, Urine 01/23/2018 100 mg/dL   Final     Assessment/Plan   Javier was seen today for diabetes, vitamin d deficiency, hyperlipidemia, hypertension and chronic kidney disease.    Diagnoses and all orders for this visit:    Type 2 diabetes with nephropathy  -     Discontinue: insulin detemir (LEVEMIR FLEXTOUCH) 100 UNIT/ML injection; Inject 40 Units under the skin Every Night.  -     Comprehensive Metabolic Panel  -     Hemoglobin A1c  -     TSH  -     T4, Free  -     Microalbumin / Creatinine Urine Ratio - Urine, Clean Catch  -     insulin detemir (LEVEMIR FLEXTOUCH) 100 UNIT/ML injection; Inject 35 Units under the skin Every Night.    Type 2 diabetes mellitus with diabetic neuropathy, with long-term current use of insulin  -     Comprehensive Metabolic Panel  -     Hemoglobin A1c  -     TSH  -     T4, Free  -     Microalbumin / Creatinine Urine Ratio - Urine, Clean Catch    Hyperlipidemia, unspecified hyperlipidemia type  -     Comprehensive Metabolic Panel  -     Lipid Panel  -     TSH  -     T4, Free    Vitamin D deficiency  -     Comprehensive Metabolic Panel  -     Vitamin D 25 Hydroxy    Essential hypertension  -     Comprehensive Metabolic Panel    Mild intellectual disability      Levemir to 35 units every evening.    Continue Starlix and Januvia.  Call with blood sugars in one week.  Continue Lipitor 20 mg once a day.  Continue vitamin D 1000 units per day.  Continue amlodipine.  Will defer blood pressure control to Dr. Rubin.    Send copy of my notes and labs to Dr. Rubin and Francisca Chiang NP.    RTC 6 mos

## 2018-07-03 LAB
25(OH)D3+25(OH)D2 SERPL-MCNC: 45.4 NG/ML (ref 30–100)
ALBUMIN SERPL-MCNC: 4.8 G/DL (ref 3.5–5.2)
ALBUMIN/CREAT UR: 160.7 MG/G CREAT (ref 0–30)
ALBUMIN/GLOB SERPL: 2.1 G/DL
ALP SERPL-CCNC: 64 U/L (ref 39–117)
ALT SERPL-CCNC: 26 U/L (ref 1–41)
AST SERPL-CCNC: 20 U/L (ref 1–40)
BILIRUB SERPL-MCNC: 0.3 MG/DL (ref 0.1–1.2)
BUN SERPL-MCNC: 23 MG/DL (ref 8–23)
BUN/CREAT SERPL: 12.8 (ref 7–25)
CALCIUM SERPL-MCNC: 9.4 MG/DL (ref 8.6–10.5)
CHLORIDE SERPL-SCNC: 100 MMOL/L (ref 98–107)
CHOLEST SERPL-MCNC: 158 MG/DL (ref 0–200)
CO2 SERPL-SCNC: 25.9 MMOL/L (ref 22–29)
CREAT SERPL-MCNC: 1.79 MG/DL (ref 0.76–1.27)
CREAT UR-MCNC: 43.5 MG/DL
GLOBULIN SER CALC-MCNC: 2.3 GM/DL
GLUCOSE SERPL-MCNC: 204 MG/DL (ref 65–99)
HBA1C MFR BLD: 10.03 % (ref 4.8–5.6)
HDLC SERPL-MCNC: 44 MG/DL (ref 40–60)
INTERPRETATION: NORMAL
LDLC SERPL CALC-MCNC: 87 MG/DL (ref 0–100)
Lab: NORMAL
MICROALBUMIN UR-MCNC: 69.9 UG/ML
POTASSIUM SERPL-SCNC: 4.5 MMOL/L (ref 3.5–5.2)
PROT SERPL-MCNC: 7.1 G/DL (ref 6–8.5)
SODIUM SERPL-SCNC: 140 MMOL/L (ref 136–145)
T4 FREE SERPL-MCNC: 1.17 NG/DL (ref 0.93–1.7)
TRIGL SERPL-MCNC: 134 MG/DL (ref 0–150)
TSH SERPL DL<=0.005 MIU/L-ACNC: 2.39 MIU/ML (ref 0.27–4.2)
VLDLC SERPL CALC-MCNC: 26.8 MG/DL (ref 5–40)

## 2018-07-09 RX ORDER — NATEGLINIDE 120 MG/1
TABLET ORAL
Qty: 90 TABLET | Refills: 5 | Status: SHIPPED | OUTPATIENT
Start: 2018-07-09 | End: 2019-01-08 | Stop reason: SDUPTHER

## 2018-07-13 RX ORDER — CALCIUM CITRATE/VITAMIN D3 200MG-6.25
TABLET ORAL
Qty: 300 EACH | Refills: 1 | Status: SHIPPED | OUTPATIENT
Start: 2018-07-13 | End: 2019-01-21 | Stop reason: SDUPTHER

## 2018-07-16 RX ORDER — SITAGLIPTIN 50 MG/1
50 TABLET, FILM COATED ORAL DAILY
Qty: 90 TABLET | Refills: 1 | Status: SHIPPED | OUTPATIENT
Start: 2018-07-16 | End: 2019-01-10 | Stop reason: SDUPTHER

## 2018-08-09 ENCOUNTER — OFFICE VISIT (OUTPATIENT)
Dept: FAMILY MEDICINE CLINIC | Facility: CLINIC | Age: 68
End: 2018-08-09

## 2018-08-09 VITALS
HEART RATE: 78 BPM | TEMPERATURE: 98.3 F | WEIGHT: 250.8 LBS | DIASTOLIC BLOOD PRESSURE: 62 MMHG | SYSTOLIC BLOOD PRESSURE: 140 MMHG | RESPIRATION RATE: 16 BRPM | BODY MASS INDEX: 35.9 KG/M2 | OXYGEN SATURATION: 94 % | HEIGHT: 70 IN

## 2018-08-09 DIAGNOSIS — Z00.00 MEDICARE ANNUAL WELLNESS VISIT, SUBSEQUENT: Primary | ICD-10-CM

## 2018-08-09 PROCEDURE — G0439 PPPS, SUBSEQ VISIT: HCPCS | Performed by: NURSE PRACTITIONER

## 2018-08-09 RX ORDER — LISINOPRIL 10 MG/1
TABLET ORAL
COMMUNITY
Start: 2018-07-28 | End: 2018-10-30 | Stop reason: SDUPTHER

## 2018-08-09 NOTE — PATIENT INSTRUCTIONS
Medicare Wellness  Personal Prevention Plan of Service     Date of Office Visit:  2018  Encounter Provider:  DONNIE Rebollar  Place of Service:  [facility]  Patient Name: Javier Torres  :  1950    As part of the Medicare Wellness portion of your visit today, we are providing you with this personalized preventive plan of services (PPPS). This plan is based upon recommendations of the [place] Preventive Services Task Force (USPSTF) and the Advisory Committee on Immunization Practices (ACIP).    This lists the preventive care services that should be considered, and provides dates of when you are due. Items listed as completed are up-to-date and do not require any further intervention.    Health Maintenance   Topic Date Due   • TDAP/TD VACCINES (1 - Tdap) 1968   • ZOSTER VACCINE (2 of 3) 2017   • DIABETIC EYE EXAM  2017   • PNEUMOCOCCAL VACCINES (65+ LOW/MEDIUM RISK) (2 of 2 - PPSV23) 2018   • MEDICARE ANNUAL WELLNESS  2018   • INFLUENZA VACCINE  2018   • HEMOGLOBIN A1C  2019   • DIABETIC FOOT EXAM  2019   • LIPID PANEL  2019   • URINE MICROALBUMIN  2019   • COLONOSCOPY  2023   • HEPATITIS C SCREENING  Completed       No orders of the defined types were placed in this encounter.      Return in about 1 year (around 2019) for Medicare Wellness.

## 2018-08-09 NOTE — PROGRESS NOTES
QUICK REFERENCE INFORMATION:  The ABCs of the Annual Wellness Visit    Subsequent Medicare Wellness Visit    HEALTH RISK ASSESSMENT    1950    Recent Hospitalizations:  No hospitalization(s) within the last year.        Current Medical Providers:  Patient Care Team:  Carrie Ocampo APRN as PCP - General (Family Medicine)  Carrie Ocampo APRN as PCP - Family Medicine  Lenin Banegas DPM as PCP - Claims Attributed  Blake Rubin MD as Consulting Physician (Nephrology)  Sylvester Wei DPM as Consulting Physician (Podiatry)  Goldy Meyers MD as Consulting Physician (Endocrinology)  Warren Castaneda OD (Optometry)  Federico Saucedo MD as Surgeon (Orthopedic Surgery)  Lenin Banegas DPM (Podiatry)        Smoking Status:  History   Smoking Status   • Never Smoker   Smokeless Tobacco   • Never Used       Alcohol Consumption:  History   Alcohol Use No       Depression Screen:   PHQ-2/PHQ-9 Depression Screening 8/9/2018   Little interest or pleasure in doing things 0   Feeling down, depressed, or hopeless 0   Trouble falling or staying asleep, or sleeping too much 0   Feeling tired or having little energy 0   Poor appetite or overeating 0   Feeling bad about yourself - or that you are a failure or have let yourself or your family down 0   Trouble concentrating on things, such as reading the newspaper or watching television 0   Moving or speaking so slowly that other people could have noticed. Or the opposite - being so fidgety or restless that you have been moving around a lot more than usual 0   Thoughts that you would be better off dead, or of hurting yourself in some way 0   Total Score 0   If you checked off any problems, how difficult have these problems made it for you to do your work, take care of things at home, or get along with other people? Not difficult at all       Health Habits and Functional and Cognitive Screening:  Functional &  Cognitive Status 8/9/2018   Do you have difficulty preparing food and eating? No   Do you have difficulty bathing yourself, getting dressed or grooming yourself? No   Do you have difficulty using the toilet? No   Do you have difficulty moving around from place to place? No   Do you have trouble with steps or getting out of a bed or a chair? No   In the past year have you fallen or experienced a near fall? No   Current Diet Diabetic Diet   Dental Exam Up to date   Eye Exam Unknown   Exercise (times per week) 3 times per week   Current Exercise Activities Include Walking   Do you need help using the phone?  No   Are you deaf or do you have serious difficulty hearing?  Yes   Do you need help with transportation? No   Do you need help shopping? No   Do you need help preparing meals?  No   Do you need help with housework?  No   Do you need help with laundry? No   Do you need help taking your medications? No   Do you need help managing money? Yes   Do you ever drive or ride in a car without wearing a seat belt? No   Have you felt unusual stress, anger or loneliness in the last month? No   Who do you live with? Alone   If you need help, do you have trouble finding someone available to you? No   Have you been bothered in the last four weeks by sexual problems? No   Do you have difficulty concentrating, remembering or making decisions? No           Does the patient have evidence of cognitive impairment? Yes, mildly mental retardation.    Aspirin use counseling: Taking ASA appropriately as indicated.      Recent Lab Results:  CMP:  Lab Results   Component Value Date     (H) 07/02/2018    BUN 23 07/02/2018    CREATININE 1.79 (H) 07/02/2018    EGFRIFNONA 38 (L) 07/02/2018    EGFRIFAFRI 46 (L) 07/02/2018    BCR 12.8 07/02/2018     07/02/2018    K 4.5 07/02/2018    CO2 25.9 07/02/2018    CALCIUM 9.4 07/02/2018    PROTENTOTREF 7.1 07/02/2018    ALBUMIN 4.80 07/02/2018    LABGLOBREF 2.3 07/02/2018    LABIL2 2.1  07/02/2018    BILITOT 0.3 07/02/2018    ALKPHOS 64 07/02/2018    AST 20 07/02/2018    ALT 26 07/02/2018     Lipid Panel:  Lab Results   Component Value Date    TRIG 134 07/02/2018    HDL 44 07/02/2018    VLDL 26.8 07/02/2018     HbA1c:  Lab Results   Component Value Date    HGBA1C 10.03 (H) 07/02/2018       Visual Acuity:  No exam data present    Age-appropriate Screening Schedule:  Refer to the list below for future screening recommendations based on patient's age, sex and/or medical conditions. Orders for these recommended tests are listed in the plan section. The patient has been provided with a written plan.    Health Maintenance   Topic Date Due   • TDAP/TD VACCINES (1 - Tdap) 02/28/1968   • ZOSTER VACCINE (2 of 3) 05/30/2017   • DIABETIC EYE EXAM  07/14/2017   • PNEUMOCOCCAL VACCINES (65+ LOW/MEDIUM RISK) (2 of 2 - PPSV23) 04/04/2018   • INFLUENZA VACCINE  08/01/2018   • HEMOGLOBIN A1C  01/02/2019   • DIABETIC FOOT EXAM  07/02/2019   • LIPID PANEL  07/02/2019   • URINE MICROALBUMIN  07/02/2019   • COLONOSCOPY  03/14/2023        Subjective   History of Present Illness    Javier Torres is a 68 y.o. male who presents for an Subsequent Wellness Visit.    The following portions of the patient's history were reviewed and updated as appropriate: allergies, current medications, past family history, past medical history, past social history, past surgical history and problem list.    Outpatient Medications Prior to Visit   Medication Sig Dispense Refill   • amLODIPine (NORVASC) 10 MG tablet Take 1 tablet by mouth Daily. 30 tablet 6   • aspirin 81 MG tablet Take 1 tablet by mouth Daily. 90 tablet 3   • atorvastatin (LIPITOR) 20 MG tablet Take 1 tablet by mouth Every Night. 30 tablet 6   • Cholecalciferol (VITAMIN D3) 81329 units tablet Take 1 tablet by mouth.     • colchicine 0.6 MG tablet Take 1 tablet by mouth Daily. 30 tablet 6   • insulin detemir (LEVEMIR FLEXTOUCH) 100 UNIT/ML injection Inject 35 Units under the  skin Every Night. 5 pen 5   • Insulin Pen Needle (BD PEN NEEDLE SAMIA U/F) 32G X 4 MM misc Dx code E11.4 using 3 pen needles daily 100 each 0   • JANUVIA 50 MG tablet TAKE 1 TABLET BY MOUTH DAILY. 90 tablet 1   • nateglinide (STARLIX) 120 MG tablet TAKE 1 TABLET BY MOUTH 3 TIMES A DAY BEFORE MEALS 90 tablet 5   • raNITIdine (ZANTAC) 150 MG tablet Take 1 tablet by mouth Every Night. 30 tablet 6   • rivaroxaban (XARELTO) 20 MG tablet Take 1 tablet by mouth Daily With Dinner. 30 tablet 6   • TRUE METRIX BLOOD GLUCOSE TEST test strip Dx code E11.40 testing bs 3 x day 300 each 1   • [employer] LANCETS misc Testing bs 3  X day dx code E11.40 100 each 5   • fluticasone (FLONASE) 50 MCG/ACT nasal spray 2 sprays into each nostril Daily. 18.2 mL 3     No facility-administered medications prior to visit.        Patient Active Problem List   Diagnosis   • Type 2 diabetes with nephropathy (CMS/[facility])   • Essential hypertension   • Hyperlipidemia   • Vitamin D deficiency   • Benign prostatic hyperplasia with urinary obstruction   • Benign neoplasm of skin of buttock   • Chronic kidney disease, stage III (moderate)   • Dyslipidemia   • Edema   • Gastroesophageal reflux disease   • Gout   • Hearing loss   • Mild intellectual disability   • Adiposity   • Obstructive sleep apnea syndrome   • Thoracic back pain   • Tricuspid valve insufficiency   • Cholelithiasis   • History of DVT (deep vein thrombosis)   • Medicare annual wellness visit, subsequent   • Type 2 diabetes mellitus with diabetic neuropathy, with long-term current use of insulin (CMS/[facility])   • Neurofibroma of trunk       Advance Care Planning:  has an advance directive - a copy HAS NOT been provided. Have asked the patient to send this to us to add to record.    Identification of Risk Factors:  Risk factors include: weight , cardiovascular risk, cognitive impairment, hearing limitations and polypharmacy.    Review of Systems   All other systems reviewed and are  "negative.      Compared to one year ago, the patient feels his physical health is better.  Compared to one year ago, the patient feels his mental health is the same.    Objective     Physical Exam    Vitals:    08/09/18 0755   BP: 140/62   BP Location: Left arm   Patient Position: Sitting   Cuff Size: Adult   Pulse: 78   Resp: 16   Temp: 98.3 °F (36.8 °C)   TempSrc: Oral   SpO2: 94%   Weight: 114 kg (250 lb 12.8 oz)   Height: 177.8 cm (70\")   PainSc: 0-No pain       Patient's Body mass index is 35.99 kg/m². BMI is above normal parameters. Recommendations include: exercise counseling.Pt is doing well to hold his wt between 247-250.      Assessment/Plan   Patient Self-Management and Personalized Health Advice  The patient has been provided with information about: diet, exercise, weight management, prevention of cardiac or vascular disease and designing advance directives and preventive services including:   · Advance directive, Pneumococcal vaccine , TdaP vaccine, Zostavax vaccine (Herpes Zoster).Pt aware of need to get from outside pharmacy.Pt states he will call and get a diabetic eye exam scheduled.    Visit Diagnoses:    ICD-10-CM ICD-9-CM   1. Medicare annual wellness visit, subsequent Z00.00 V70.0       No orders of the defined types were placed in this encounter.      Outpatient Encounter Prescriptions as of 8/9/2018   Medication Sig Dispense Refill   • amLODIPine (NORVASC) 10 MG tablet Take 1 tablet by mouth Daily. 30 tablet 6   • aspirin 81 MG tablet Take 1 tablet by mouth Daily. 90 tablet 3   • atorvastatin (LIPITOR) 20 MG tablet Take 1 tablet by mouth Every Night. 30 tablet 6   • Cholecalciferol (VITAMIN D3) 90010 units tablet Take 1 tablet by mouth.     • colchicine 0.6 MG tablet Take 1 tablet by mouth Daily. 30 tablet 6   • insulin detemir (LEVEMIR FLEXTOUCH) 100 UNIT/ML injection Inject 35 Units under the skin Every Night. 5 pen 5   • Insulin Pen Needle (BD PEN NEEDLE SAMIA U/F) 32G X 4 MM misc Dx code " E11.4 using 3 pen needles daily 100 each 0   • JANUVIA 50 MG tablet TAKE 1 TABLET BY MOUTH DAILY. 90 tablet 1   • lisinopril (PRINIVIL,ZESTRIL) 10 MG tablet      • nateglinide (STARLIX) 120 MG tablet TAKE 1 TABLET BY MOUTH 3 TIMES A DAY BEFORE MEALS 90 tablet 5   • raNITIdine (ZANTAC) 150 MG tablet Take 1 tablet by mouth Every Night. 30 tablet 6   • rivaroxaban (XARELTO) 20 MG tablet Take 1 tablet by mouth Daily With Dinner. 30 tablet 6   • TRUE METRIX BLOOD GLUCOSE TEST test strip Dx code E11.40 testing bs 3 x day 300 each 1   • [employer] LANCETS misc Testing bs 3  X day dx code E11.40 100 each 5   • [DISCONTINUED] fluticasone (FLONASE) 50 MCG/ACT nasal spray 2 sprays into each nostril Daily. 18.2 mL 3     No facility-administered encounter medications on file as of 8/9/2018.        Reviewed use of high risk medication in the elderly: yes  Reviewed for potential of harmful drug interactions in the elderly: yes    Follow Up:  Return in about 1 year (around 8/9/2019) for Medicare Wellness.  To keep appointments with Dr. Goldy Meyers and for diabetes, Dr. Lozano Daily for kidney care, and Dr. Sylvester Wei for podiatry. To visit this office every 6 months and as needed notifying us immediately of any decline in health status    An After Visit Summary and PPPS with all of these plans were given to the patient.

## 2018-08-24 DIAGNOSIS — M10.9 GOUT OF FOOT, UNSPECIFIED CAUSE, UNSPECIFIED CHRONICITY, UNSPECIFIED LATERALITY: ICD-10-CM

## 2018-08-24 RX ORDER — ATORVASTATIN CALCIUM 20 MG/1
20 TABLET, FILM COATED ORAL NIGHTLY
Qty: 30 TABLET | Status: CANCELLED | OUTPATIENT
Start: 2018-08-24

## 2018-08-24 RX ORDER — ATORVASTATIN CALCIUM 20 MG/1
20 TABLET, FILM COATED ORAL NIGHTLY
Qty: 30 TABLET | Refills: 6 | Status: SHIPPED | OUTPATIENT
Start: 2018-08-24 | End: 2018-10-30 | Stop reason: SDUPTHER

## 2018-10-15 RX ORDER — PEN NEEDLE, DIABETIC 32GX 5/32"
NEEDLE, DISPOSABLE MISCELLANEOUS
Qty: 100 EACH | Refills: 0 | Status: SHIPPED | OUTPATIENT
Start: 2018-10-15 | End: 2019-01-26 | Stop reason: SDUPTHER

## 2018-10-17 DIAGNOSIS — K21.9 GASTROESOPHAGEAL REFLUX DISEASE, ESOPHAGITIS PRESENCE NOT SPECIFIED: ICD-10-CM

## 2018-10-18 RX ORDER — RANITIDINE 150 MG/1
TABLET ORAL
Qty: 30 TABLET | Refills: 0 | Status: SHIPPED | OUTPATIENT
Start: 2018-10-18 | End: 2018-10-30 | Stop reason: SDUPTHER

## 2018-10-30 ENCOUNTER — OFFICE VISIT (OUTPATIENT)
Dept: FAMILY MEDICINE CLINIC | Facility: CLINIC | Age: 68
End: 2018-10-30

## 2018-10-30 VITALS
HEIGHT: 70 IN | BODY MASS INDEX: 36.45 KG/M2 | OXYGEN SATURATION: 98 % | DIASTOLIC BLOOD PRESSURE: 60 MMHG | HEART RATE: 58 BPM | RESPIRATION RATE: 16 BRPM | TEMPERATURE: 98.1 F | WEIGHT: 254.6 LBS | SYSTOLIC BLOOD PRESSURE: 130 MMHG

## 2018-10-30 DIAGNOSIS — M10.9 GOUT OF FOOT, UNSPECIFIED CAUSE, UNSPECIFIED CHRONICITY, UNSPECIFIED LATERALITY: ICD-10-CM

## 2018-10-30 DIAGNOSIS — K21.9 GASTROESOPHAGEAL REFLUX DISEASE, ESOPHAGITIS PRESENCE NOT SPECIFIED: ICD-10-CM

## 2018-10-30 PROCEDURE — 99213 OFFICE O/P EST LOW 20 MIN: CPT | Performed by: NURSE PRACTITIONER

## 2018-10-30 RX ORDER — RANITIDINE 150 MG/1
150 TABLET ORAL 2 TIMES DAILY PRN
Qty: 60 TABLET | Refills: 6 | Status: SHIPPED | OUTPATIENT
Start: 2018-10-30 | End: 2019-11-18 | Stop reason: SDUPTHER

## 2018-10-30 RX ORDER — ATORVASTATIN CALCIUM 20 MG/1
20 TABLET, FILM COATED ORAL NIGHTLY
Qty: 30 TABLET | Refills: 6 | Status: SHIPPED | OUTPATIENT
Start: 2018-10-30 | End: 2019-11-01 | Stop reason: SDUPTHER

## 2018-10-30 RX ORDER — LISINOPRIL 10 MG/1
10 TABLET ORAL DAILY
Qty: 30 TABLET | Refills: 6 | Status: SHIPPED | OUTPATIENT
Start: 2018-10-30 | End: 2019-04-16

## 2018-10-30 RX ORDER — AMLODIPINE BESYLATE 10 MG/1
10 TABLET ORAL DAILY
Qty: 30 TABLET | Refills: 6 | Status: SHIPPED | OUTPATIENT
Start: 2018-10-30 | End: 2019-07-19 | Stop reason: SDUPTHER

## 2018-10-30 RX ORDER — COLCHICINE 0.6 MG/1
0.6 TABLET ORAL DAILY
Qty: 30 TABLET | Refills: 6 | Status: SHIPPED | OUTPATIENT
Start: 2018-10-30 | End: 2019-07-24 | Stop reason: SDUPTHER

## 2018-10-30 NOTE — PROGRESS NOTES
Subjective   Javier Torres is a 68 y.o. male. Patient is being evaluated today for medication check on GERD, dyslipidemia, and hypertension.     History of Present Illness 68 yr old white male to discuss long-term care with medications for his GERD which is controlled with Zantac 150 mg tablet maximum of twice a day. Dyslipidemia treated with atorvastatin 20 mg tablet per night diet and exercise and hypertension responding well to aspirin 81 mg tablet per day, lisinopril 10 mg tablet per day he can with diet and daily exercise and offers no complaints. Since labs not due until January 2019. Medications to be renewed as needed.    The following portions of the patient's history were reviewed and updated as appropriate: allergies, current medications, past family history, past medical history, past social history, past surgical history and problem list.    Review of Systems   Cardiovascular:        Dyslipidemia.  Hypertension.    Gastrointestinal: Positive for GERD.   All other systems reviewed and are negative.      Objective   Physical Exam   Constitutional: He is oriented to person, place, and time. He appears well-developed and well-nourished.   Obese   HENT:   Head: Normocephalic and atraumatic.   Eyes: Pupils are equal, round, and reactive to light. EOM are normal.   Neck: Normal range of motion. Neck supple.   Cardiovascular: Normal rate, regular rhythm, normal heart sounds and intact distal pulses.    Pulmonary/Chest: Effort normal and breath sounds normal.   Abdominal: Soft.   Musculoskeletal: Normal range of motion.   Normal arthralgias and myalgias due to arthritic change secondary to age and obesity   Neurological: He is alert and oriented to person, place, and time.   Skin: Skin is warm and dry. Capillary refill takes less than 2 seconds.   Psychiatric: He has a normal mood and affect. His behavior is normal. Judgment and thought content normal.   Nursing note and vitals reviewed.        Assessment/Plan    Javier was seen today for med management.    Diagnoses and all orders for this visit:    Gastroesophageal reflux disease, esophagitis presence not specified  -     raNITIdine (ZANTAC) 150 MG tablet; Take 1 tablet by mouth 2 (Two) Times a Day As Needed for Heartburn.    Gout of foot, unspecified cause, unspecified chronicity, unspecified laterality  -     amLODIPine (NORVASC) 10 MG tablet; Take 1 tablet by mouth Daily.  -     aspirin 81 MG tablet; Take 1 tablet by mouth Daily.  -     atorvastatin (LIPITOR) 20 MG tablet; Take 1 tablet by mouth Every Night.  -     colchicine 0.6 MG tablet; Take 1 tablet by mouth Daily.  -     rivaroxaban (XARELTO) 20 MG tablet; Take 1 tablet by mouth Daily With Dinner.    Other orders  -     lisinopril (PRINIVIL,ZESTRIL) 10 MG tablet; Take 1 tablet by mouth Daily.        Patient's healthcare maintenance patient due for T dap zoster vaccine (will get at health Department) and influenza vaccine (states he already has [facility]). Diabetic eye exam (states was done through ophthalmologist when his cataracts were removed). To continue eating a low-fat high-fiber no added sugar diet, drinking 6-8, 8 ounce glasses of water a day and ambulating 30 minutes every day briskly to raise his heart rate this will help maintain strong bones, muscle mass, heart health and has been known to elevate the psyche. To notify us immediately of any decline in health status and a visit this office at least every 6 months.

## 2018-12-18 DIAGNOSIS — M10.9 GOUT OF FOOT, UNSPECIFIED CAUSE, UNSPECIFIED CHRONICITY, UNSPECIFIED LATERALITY: ICD-10-CM

## 2018-12-18 RX ORDER — AMLODIPINE BESYLATE 10 MG/1
10 TABLET ORAL DAILY
Qty: 30 TABLET | Refills: 6 | OUTPATIENT
Start: 2018-12-18

## 2018-12-18 RX ORDER — COLCHICINE 0.6 MG/1
0.6 TABLET ORAL DAILY
Qty: 30 TABLET | Refills: 6 | OUTPATIENT
Start: 2018-12-18

## 2019-01-09 RX ORDER — NATEGLINIDE 120 MG/1
TABLET ORAL
Qty: 90 TABLET | Refills: 0 | Status: SHIPPED | OUTPATIENT
Start: 2019-01-09 | End: 2019-02-11 | Stop reason: SDUPTHER

## 2019-01-10 DIAGNOSIS — M10.9 GOUT OF FOOT, UNSPECIFIED CAUSE, UNSPECIFIED CHRONICITY, UNSPECIFIED LATERALITY: ICD-10-CM

## 2019-01-10 RX ORDER — SITAGLIPTIN 50 MG/1
50 TABLET, FILM COATED ORAL DAILY
Qty: 30 TABLET | Refills: 0 | Status: SHIPPED | OUTPATIENT
Start: 2019-01-10 | End: 2019-02-11 | Stop reason: SDUPTHER

## 2019-01-10 RX ORDER — RIVAROXABAN 20 MG/1
TABLET, FILM COATED ORAL
Qty: 90 TABLET | Refills: 1 | Status: SHIPPED | OUTPATIENT
Start: 2019-01-10 | End: 2019-04-10 | Stop reason: SDUPTHER

## 2019-01-21 RX ORDER — CALCIUM CITRATE/VITAMIN D3 200MG-6.25
TABLET ORAL
Qty: 300 EACH | Refills: 0 | Status: SHIPPED | OUTPATIENT
Start: 2019-01-21 | End: 2019-01-24 | Stop reason: CLARIF

## 2019-01-24 ENCOUNTER — TELEPHONE (OUTPATIENT)
Dept: ENDOCRINOLOGY | Age: 69
End: 2019-01-24

## 2019-01-24 RX ORDER — BLOOD SUGAR DIAGNOSTIC
STRIP MISCELLANEOUS
Qty: 300 EACH | Refills: 1 | Status: SHIPPED | OUTPATIENT
Start: 2019-01-24 | End: 2019-05-10 | Stop reason: SDUPTHER

## 2019-01-24 NOTE — TELEPHONE ENCOUNTER
Sent in to the pharmacy         ----- Message from Leanne Miner sent at 1/24/2019  2:03 PM EST -----  Contact: NANDA MULLEN ([employer])  SAID YOU WERE WAITING FOR HER TO CB W/ INFORMATION ON WHAT THE INSURANCE WILL COVER, SAID THEY WOULD COVER THE ONE TOUCH VERIO TEST STRIPS, TESTS 3XDAY.  PHARMACY IS IN CHART ([facility])    IF YOU NEED TO CB HER NUMBER [phone]

## 2019-02-11 RX ORDER — NATEGLINIDE 120 MG/1
TABLET ORAL
Qty: 90 TABLET | Refills: 0 | Status: SHIPPED | OUTPATIENT
Start: 2019-02-11 | End: 2019-03-11 | Stop reason: SDUPTHER

## 2019-02-11 RX ORDER — SITAGLIPTIN 50 MG/1
50 TABLET, FILM COATED ORAL DAILY
Qty: 30 TABLET | Refills: 0 | Status: SHIPPED | OUTPATIENT
Start: 2019-02-11 | End: 2019-03-13 | Stop reason: SDUPTHER

## 2019-03-11 RX ORDER — NATEGLINIDE 120 MG/1
TABLET ORAL
Qty: 30 TABLET | Refills: 0 | Status: SHIPPED | OUTPATIENT
Start: 2019-03-11 | End: 2019-04-15 | Stop reason: SDUPTHER

## 2019-03-13 RX ORDER — SITAGLIPTIN 50 MG/1
50 TABLET, FILM COATED ORAL DAILY
Qty: 30 TABLET | Refills: 0 | Status: SHIPPED | OUTPATIENT
Start: 2019-03-13 | End: 2019-04-12 | Stop reason: SDUPTHER

## 2019-04-10 DIAGNOSIS — M10.9 GOUT OF FOOT, UNSPECIFIED CAUSE, UNSPECIFIED CHRONICITY, UNSPECIFIED LATERALITY: ICD-10-CM

## 2019-04-11 RX ORDER — RIVAROXABAN 20 MG/1
TABLET, FILM COATED ORAL
Qty: 90 TABLET | Refills: 0 | Status: SHIPPED | OUTPATIENT
Start: 2019-04-11 | End: 2019-10-09 | Stop reason: SDUPTHER

## 2019-04-11 NOTE — TELEPHONE ENCOUNTER
OK TO REFILL, HOWEVER, HE IS DUE END OF April OR BEGINNING OF MAY FOR 6 MONTH FOLLOW UP AND FASTING LABS. PLEASE HAVE HIM MAKE APPT- CAN MAKE APPT WITH ARIELA WHEN SHE RETURNS.

## 2019-04-12 RX ORDER — SITAGLIPTIN 50 MG/1
50 TABLET, FILM COATED ORAL DAILY
Qty: 30 TABLET | Refills: 0 | Status: SHIPPED | OUTPATIENT
Start: 2019-04-12 | End: 2019-07-10 | Stop reason: SDUPTHER

## 2019-04-15 RX ORDER — NATEGLINIDE 120 MG/1
TABLET ORAL
Qty: 90 TABLET | Refills: 0 | Status: SHIPPED | OUTPATIENT
Start: 2019-04-15 | End: 2019-05-14 | Stop reason: SDUPTHER

## 2019-04-15 NOTE — PROGRESS NOTES
Subjective   Javier Torres is a 69 y.o. male.     F/u for dm 2, hypertension, hyperlipidemia, CKD, vitamin d def /testing bs 3 x day / last dm eye exam 7/14/16 with dr Castaneda. Last dm foot exam today with dr Meyers           Patient has known diabetes mellitus for many years. He was started on insulin in 2014. He has been on Levemir 35 units at bedtime, Starlix 120 mg 3 times a day with meals, and Januvia 50 mg/day. Fasting blood sugar runs 104-208.  Lunchtime blood sugar runs between 157--226.  Suppertime blood sugars runs between 134-393.  He eats out with family 2-3 times a week. He normally eats lunch 3 days a week at a senior center. He eats breakfast and supper at home. He denies any severe hypoglycemic episodes.  He has gained 5 pounds since 7/18.    He lives by himself and a cousin lives across the street. His last meal was 7 AM.      His last eye examination was in 2017.   He has no history of retinopathy. He has chronic kidney disease and vitamin D deficiency and last saw Dr. Rubin in 1/18.  He is on vitamin D 1000 units daily.  He denies numbness, tingling, or burning sensation in his feet.      He has hyperlipidemia and has been on Lipitor 20 mg once a day. He denies any myalgia. He has no history of thyroid disease.       He has hypertension and is on Norvasc 10 mg once a day and lisinopril 20 mg/day.  He has no previous history of heart attack or stroke.  He denies chest pain, shortness of breath or pedal edema.  Blood pressure at home runs between 130-164 systolic.    The following portions of the patient's history were reviewed and updated as appropriate: allergies, current medications, past family history, past medical history, past social history, past surgical history and problem list.    Review of Systems   Constitutional: Negative.    HENT: Negative.    Eyes: Negative.    Respiratory: Negative.    Cardiovascular: Negative.    Gastrointestinal: Negative.    Endocrine: Negative.   "  Genitourinary: Positive for frequency.   Musculoskeletal: Negative.    Skin: Negative.    Allergic/Immunologic: Negative.    Neurological: Negative.    Hematological: Negative.  Does not bruise/bleed easily.   Psychiatric/Behavioral: Negative.        Objective      Vitals:    04/16/19 1108 04/16/19 1141   BP: 162/58 152/60   BP Location: Left arm    Patient Position: Sitting    Cuff Size: Large Adult    Pulse: 60    SpO2: 98%    Weight: 114 kg (252 lb)    Height: 177.8 cm (70\")      Physical Exam   Constitutional: He is oriented to person, place, and time. He appears well-developed and well-nourished. No distress.   HENT:   Head: Normocephalic.   Right Ear: External ear normal.   Left Ear: External ear normal.   Nose: Nose normal.   Mouth/Throat: Oropharynx is clear and moist. No oropharyngeal exudate.   Eyes: Conjunctivae and EOM are normal. Right eye exhibits no discharge. Left eye exhibits no discharge. No scleral icterus.   Neck: Neck supple. No JVD present. No tracheal deviation present. No thyromegaly present.   Cardiovascular: Normal rate, regular rhythm, normal heart sounds and intact distal pulses. Exam reveals no gallop and no friction rub.   No murmur heard.  Pulmonary/Chest: Effort normal and breath sounds normal. No stridor. No respiratory distress. He has no wheezes. He has no rales. He exhibits no tenderness.   Abdominal: Soft. Bowel sounds are normal. He exhibits no distension and no mass. There is no tenderness. There is no rebound and no guarding.   Musculoskeletal: Normal range of motion. He exhibits no edema, tenderness or deformity.   Bunion deformity bilaterally.  No plantar ulcers   Lymphadenopathy:     He has no cervical adenopathy.   Neurological: He is alert and oriented to person, place, and time. He displays normal reflexes. He exhibits normal muscle tone. Coordination normal.   Intact light touch on both lower extremities   Skin: Skin is warm and dry. No rash noted. No erythema. No " pallor.   Psychiatric: He has a normal mood and affect. His behavior is normal.     Office Visit on 07/02/2018   Component Date Value Ref Range Status   • Glucose 07/02/2018 204* 65 - 99 mg/dL Final   • BUN 07/02/2018 23  8 - 23 mg/dL Final   • Creatinine 07/02/2018 1.79* 0.76 - 1.27 mg/dL Final   • eGFR Non  Am 07/02/2018 38* >60 mL/min/1.73 Final   • eGFR African Am 07/02/2018 46* >60 mL/min/1.73 Final   • BUN/Creatinine Ratio 07/02/2018 12.8  7.0 - 25.0 Final   • Sodium 07/02/2018 140  136 - 145 mmol/L Final   • Potassium 07/02/2018 4.5  3.5 - 5.2 mmol/L Final   • Chloride 07/02/2018 100  98 - 107 mmol/L Final   • Total CO2 07/02/2018 25.9  22.0 - 29.0 mmol/L Final   • Calcium 07/02/2018 9.4  8.6 - 10.5 mg/dL Final   • Total Protein 07/02/2018 7.1  6.0 - 8.5 g/dL Final   • Albumin 07/02/2018 4.80  3.50 - 5.20 g/dL Final   • Globulin 07/02/2018 2.3  gm/dL Final   • A/G Ratio 07/02/2018 2.1  g/dL Final   • Total Bilirubin 07/02/2018 0.3  0.1 - 1.2 mg/dL Final   • Alkaline Phosphatase 07/02/2018 64  39 - 117 U/L Final   • AST (SGOT) 07/02/2018 20  1 - 40 U/L Final   • ALT (SGPT) 07/02/2018 26  1 - 41 U/L Final   • Total Cholesterol 07/02/2018 158  0 - 200 mg/dL Final   • Triglycerides 07/02/2018 134  0 - 150 mg/dL Final   • HDL Cholesterol 07/02/2018 44  40 - 60 mg/dL Final   • VLDL Cholesterol 07/02/2018 26.8  5 - 40 mg/dL Final   • LDL Cholesterol  07/02/2018 87  0 - 100 mg/dL Final   • Hemoglobin A1C 07/02/2018 10.03* 4.80 - 5.60 % Final    Comment: Hemoglobin A1C Ranges:  Increased Risk for Diabetes  5.7% to 6.4%  Diabetes                     >= 6.5%  Diabetic Goal                < 7.0%     • TSH 07/02/2018 2.390  0.270 - 4.200 mIU/mL Final   • Free T4 07/02/2018 1.17  0.93 - 1.70 ng/dL Final   • Creatinine, Urine 07/02/2018 43.5  Not Estab. mg/dL Final   • Microalbumin, Urine 07/02/2018 69.9  Not Estab. ug/mL Final   • Microalbumin/Creatinine Ratio 07/02/2018 160.7* 0.0 - 30.0 mg/g creat Final   • 25  Hydroxy, Vitamin D 07/02/2018 45.4  30.0 - 100.0 ng/ml Final    Comment: Reference Range for Total Vitamin D 25(OH)  Deficiency    <20.0 ng/mL  Insufficiency 21-29 ng/mL  Sufficiency    ng/mL  Toxicity      >100 ng/ml        • Interpretation 07/02/2018 Note   Final    Supplemental report is available.   • PDF Image 07/02/2018 Not applicable   Final     Assessment/Plan   Javier was seen today for diabetes, hyperlipidemia, hypertension, vitamin d deficiency and chronic kidney disease.    Diagnoses and all orders for this visit:    Type 2 diabetes with nephropathy (CMS/[facility])  -     insulin detemir (LEVEMIR FLEXTOUCH) 100 UNIT/ML injection; Inject 37 Units under the skin into the appropriate area as directed Every Night.  -     Comprehensive Metabolic Panel  -     Lipid Panel  -     Hemoglobin A1c  -     TSH    Type 2 diabetes mellitus with diabetic neuropathy, with long-term current use of insulin (CMS/[facility])  -     Comprehensive Metabolic Panel  -     Lipid Panel  -     Hemoglobin A1c  -     TSH    Essential hypertension  -     Comprehensive Metabolic Panel    Hyperlipidemia, unspecified hyperlipidemia type  -     Comprehensive Metabolic Panel  -     Lipid Panel  -     TSH    Obstructive sleep apnea syndrome  -     TSH    Vitamin D deficiency  -     Comprehensive Metabolic Panel  -     Vitamin D 25 Hydroxy    Gastroesophageal reflux disease, esophagitis presence not specified    Mild intellectual disability    Other orders  -     lisinopril (PRINIVIL,ZESTRIL) 40 MG tablet; 1 tablet daily      Increase Levemir to 37 units at bedtime.  Continue Starlix 120 mg 3 times a day and Januvia 50 mg once a day.  Continue vitamin D 1000 units/day.  Continue Lipitor 20 mg/day.  Increase lisinopril to 40 mg/day.  Continue amlodipine 5 mg/day.  Continue BP checks at home and follow-up with Francisca Chiang NP in 2-3 weeks for blood pressure medication adjustment.    Send copy of my note to Francisca Chiang NP and Dr. Lozano  Caryn    RTC 4 mos.

## 2019-04-16 ENCOUNTER — OFFICE VISIT (OUTPATIENT)
Dept: ENDOCRINOLOGY | Age: 69
End: 2019-04-16

## 2019-04-16 VITALS
OXYGEN SATURATION: 98 % | DIASTOLIC BLOOD PRESSURE: 60 MMHG | WEIGHT: 252 LBS | HEIGHT: 70 IN | HEART RATE: 60 BPM | BODY MASS INDEX: 36.08 KG/M2 | SYSTOLIC BLOOD PRESSURE: 152 MMHG

## 2019-04-16 DIAGNOSIS — K21.9 GASTROESOPHAGEAL REFLUX DISEASE, ESOPHAGITIS PRESENCE NOT SPECIFIED: ICD-10-CM

## 2019-04-16 DIAGNOSIS — I10 ESSENTIAL HYPERTENSION: ICD-10-CM

## 2019-04-16 DIAGNOSIS — E55.9 VITAMIN D DEFICIENCY: ICD-10-CM

## 2019-04-16 DIAGNOSIS — G47.33 OBSTRUCTIVE SLEEP APNEA SYNDROME: ICD-10-CM

## 2019-04-16 DIAGNOSIS — E11.21 TYPE 2 DIABETES WITH NEPHROPATHY (HCC): Primary | ICD-10-CM

## 2019-04-16 DIAGNOSIS — F70 MILD INTELLECTUAL DISABILITY: ICD-10-CM

## 2019-04-16 DIAGNOSIS — E78.5 HYPERLIPIDEMIA, UNSPECIFIED HYPERLIPIDEMIA TYPE: ICD-10-CM

## 2019-04-16 DIAGNOSIS — Z79.4 TYPE 2 DIABETES MELLITUS WITH DIABETIC NEUROPATHY, WITH LONG-TERM CURRENT USE OF INSULIN (HCC): ICD-10-CM

## 2019-04-16 DIAGNOSIS — E11.40 TYPE 2 DIABETES MELLITUS WITH DIABETIC NEUROPATHY, WITH LONG-TERM CURRENT USE OF INSULIN (HCC): ICD-10-CM

## 2019-04-16 PROCEDURE — 99214 OFFICE O/P EST MOD 30 MIN: CPT | Performed by: INTERNAL MEDICINE

## 2019-04-16 RX ORDER — LISINOPRIL 40 MG/1
TABLET ORAL
Qty: 30 TABLET | Refills: 6 | Status: SHIPPED | OUTPATIENT
Start: 2019-04-16 | End: 2020-02-11 | Stop reason: SDUPTHER

## 2019-04-17 LAB
25(OH)D3+25(OH)D2 SERPL-MCNC: 34.3 NG/ML (ref 30–100)
ALBUMIN SERPL-MCNC: 4.6 G/DL (ref 3.5–5.2)
ALBUMIN/GLOB SERPL: 2 G/DL
ALP SERPL-CCNC: 72 U/L (ref 39–117)
ALT SERPL-CCNC: 26 U/L (ref 1–41)
AST SERPL-CCNC: 21 U/L (ref 1–40)
BILIRUB SERPL-MCNC: 0.2 MG/DL (ref 0.2–1.2)
BUN SERPL-MCNC: 34 MG/DL (ref 8–23)
BUN/CREAT SERPL: 17 (ref 7–25)
CALCIUM SERPL-MCNC: 9.2 MG/DL (ref 8.6–10.5)
CHLORIDE SERPL-SCNC: 103 MMOL/L (ref 98–107)
CHOLEST SERPL-MCNC: 149 MG/DL (ref 0–200)
CO2 SERPL-SCNC: 23.2 MMOL/L (ref 22–29)
CREAT SERPL-MCNC: 2 MG/DL (ref 0.76–1.27)
GLOBULIN SER CALC-MCNC: 2.3 GM/DL
GLUCOSE SERPL-MCNC: 143 MG/DL (ref 65–99)
HBA1C MFR BLD: 8.43 % (ref 4.8–5.6)
HDLC SERPL-MCNC: 37 MG/DL (ref 40–60)
INTERPRETATION: NORMAL
LDLC SERPL CALC-MCNC: 74 MG/DL (ref 0–100)
Lab: NORMAL
POTASSIUM SERPL-SCNC: 5.3 MMOL/L (ref 3.5–5.2)
PROT SERPL-MCNC: 6.9 G/DL (ref 6–8.5)
SODIUM SERPL-SCNC: 139 MMOL/L (ref 136–145)
TRIGL SERPL-MCNC: 192 MG/DL (ref 0–150)
TSH SERPL DL<=0.005 MIU/L-ACNC: 2.01 MIU/ML (ref 0.27–4.2)
VLDLC SERPL CALC-MCNC: 38.4 MG/DL (ref 5–40)

## 2019-04-22 ENCOUNTER — TELEPHONE (OUTPATIENT)
Dept: FAMILY MEDICINE CLINIC | Facility: CLINIC | Age: 69
End: 2019-04-22

## 2019-04-22 NOTE — TELEPHONE ENCOUNTER
Patient needs follow up here per Endocrinology note- reports follow up in 2-3 weeks with PCP for BP.

## 2019-04-24 DIAGNOSIS — E11.21 TYPE 2 DIABETES WITH NEPHROPATHY (HCC): ICD-10-CM

## 2019-05-01 ENCOUNTER — OFFICE VISIT (OUTPATIENT)
Dept: FAMILY MEDICINE CLINIC | Facility: CLINIC | Age: 69
End: 2019-05-01

## 2019-05-01 VITALS
SYSTOLIC BLOOD PRESSURE: 148 MMHG | BODY MASS INDEX: 36.51 KG/M2 | HEIGHT: 70 IN | DIASTOLIC BLOOD PRESSURE: 72 MMHG | HEART RATE: 62 BPM | TEMPERATURE: 98 F | RESPIRATION RATE: 16 BRPM | WEIGHT: 255 LBS | OXYGEN SATURATION: 96 %

## 2019-05-01 DIAGNOSIS — I10 ESSENTIAL HYPERTENSION: Primary | ICD-10-CM

## 2019-05-01 PROCEDURE — 99214 OFFICE O/P EST MOD 30 MIN: CPT | Performed by: FAMILY MEDICINE

## 2019-05-01 RX ORDER — HYDRALAZINE HYDROCHLORIDE 10 MG/1
10 TABLET, FILM COATED ORAL 3 TIMES DAILY PRN
Qty: 90 TABLET | Refills: 2 | Status: SHIPPED | OUTPATIENT
Start: 2019-05-01 | End: 2019-07-29 | Stop reason: SDUPTHER

## 2019-05-01 NOTE — PROGRESS NOTES
Subjective   Javier Torres is a 69 y.o. male. Presents today to be evaluated for elevated blood pressure.     History of Present Illness     New patient to me    Hypertension - Patient says BP has been a bit uncontrolled lately highest 152/60.  Lisinopril was increased from 20 to 40mg about 3 weeks ago.  Patient taking medication as prescribed.  Also taking amlopidine 10mg. Denies chest pain, shortness of breath, headache, lower extremity edema.  Stage 3 chronic kidney disease history.      The following portions of the patient's history were reviewed and updated as appropriate: allergies, current medications, past family history, past medical history, past social history, past surgical history and problem list.    Review of Systems   Constitutional: Negative for activity change, appetite change, fatigue and fever.   HENT: Negative for ear pain, facial swelling and sore throat.    Eyes: Negative for discharge and itching.   Respiratory: Negative for cough, chest tightness and shortness of breath.    Cardiovascular: Negative for chest pain and palpitations.   Gastrointestinal: Negative for abdominal distention and constipation.   Endocrine: Negative for polydipsia, polyphagia and polyuria.   Genitourinary: Negative for difficulty urinating and flank pain.   Musculoskeletal: Negative for arthralgias and back pain.   Skin: Negative for color change, rash and wound.   Allergic/Immunologic: Negative for environmental allergies and food allergies.   Neurological: Negative for weakness and numbness.   Hematological: Negative for adenopathy. Does not bruise/bleed easily.   Psychiatric/Behavioral: Negative for decreased concentration and dysphoric mood. The patient is not nervous/anxious.        Objective   Physical Exam   Constitutional: He is oriented to person, place, and time. He appears well-developed and well-nourished. No distress.   HENT:   Mouth/Throat: Oropharynx is clear and moist. No oropharyngeal exudate.    Eyes: Conjunctivae are normal. Right eye exhibits no discharge. Left eye exhibits no discharge.   Neck: Normal range of motion. Neck supple.   Cardiovascular: Normal rate, regular rhythm and normal heart sounds. Exam reveals no gallop and no friction rub.   No murmur heard.  Pulmonary/Chest: Effort normal and breath sounds normal. He has no wheezes. He has no rales.   Abdominal: Soft. Bowel sounds are normal. He exhibits no distension. There is no tenderness.   Musculoskeletal: He exhibits no edema or deformity.   Lymphadenopathy:     He has no cervical adenopathy.   Neurological: He is alert and oriented to person, place, and time.   Skin: Skin is warm and dry. No rash noted.   Psychiatric: He has a normal mood and affect. His behavior is normal.   Nursing note and vitals reviewed.      Assessment/Plan   Javier was seen today for hypertension.    Diagnoses and all orders for this visit:    Essential hypertension  -     hydrALAZINE (APRESOLINE) 10 MG tablet; Take 1 tablet by mouth 3 (Three) Times a Day As Needed (BP greater than 140/90).        Going to add hydralazine to his regimen as a as needed 3 times a day.  Continue taking his other medications.  The family was rather reluctant to add a third agent so I thought I would try a as needed and see if he needs it multiple times.  If he needs it a lot then we will add it as a regular medication.  Continue to follow-up with his renal physician as scheduled.

## 2019-05-01 NOTE — PATIENT INSTRUCTIONS
Hypertension  Hypertension is another name for high blood pressure. High blood pressure forces your heart to work harder to pump blood. This can cause problems over time.  There are two numbers in a blood pressure reading. There is a top number (systolic) over a bottom number (diastolic). It is best to have a blood pressure below 120/80. Healthy choices can help lower your blood pressure. You may need medicine to help lower your blood pressure if:  · Your blood pressure cannot be lowered with healthy choices.  · Your blood pressure is higher than 130/80.    Follow these instructions at home:  Eating and drinking  · If directed, follow the DASH eating plan. This diet includes:  ? Filling half of your plate at each meal with fruits and vegetables.  ? Filling one quarter of your plate at each meal with whole grains. Whole grains include whole wheat pasta, brown rice, and whole grain bread.  ? Eating or drinking low-fat dairy products, such as skim milk or low-fat yogurt.  ? Filling one quarter of your plate at each meal with low-fat (lean) proteins. Low-fat proteins include fish, skinless chicken, eggs, beans, and tofu.  ? Avoiding fatty meat, cured and processed meat, or chicken with skin.  ? Avoiding premade or processed food.  · Eat less than 1,500 mg of salt (sodium) a day.  · Limit alcohol use to no more than 1 drink a day for nonpregnant women and 2 drinks a day for men. One drink equals 12 oz of beer, 5 oz of wine, or 1½ oz of hard liquor.  Lifestyle  · Work with your doctor to stay at a healthy weight or to lose weight. Ask your doctor what the best weight is for you.  · Get at least 30 minutes of exercise that causes your heart to beat faster (aerobic exercise) most days of the week. This may include walking, swimming, or biking.  · Get at least 30 minutes of exercise that strengthens your muscles (resistance exercise) at least 3 days a week. This may include lifting weights or pilates.  · Do not use any  products that contain nicotine or tobacco. This includes cigarettes and e-cigarettes. If you need help quitting, ask your doctor.  · Check your blood pressure at home as told by your doctor.  · Keep all follow-up visits as told by your doctor. This is important.  Medicines  · Take over-the-counter and prescription medicines only as told by your doctor. Follow directions carefully.  · Do not skip doses of blood pressure medicine. The medicine does not work as well if you skip doses. Skipping doses also puts you at risk for problems.  · Ask your doctor about side effects or reactions to medicines that you should watch for.  Contact a doctor if:  · You think you are having a reaction to the medicine you are taking.  · You have headaches that keep coming back (recurring).  · You feel dizzy.  · You have swelling in your ankles.  · You have trouble with your vision.  Get help right away if:  · You get a very bad headache.  · You start to feel confused.  · You feel weak or numb.  · You feel faint.  · You get very bad pain in your:  ? Chest.  ? Belly (abdomen).  · You throw up (vomit) more than once.  · You have trouble breathing.  Summary  · Hypertension is another name for high blood pressure.  · Making healthy choices can help lower blood pressure. If your blood pressure cannot be controlled with healthy choices, you may need to take medicine.  This information is not intended to replace advice given to you by your health care provider. Make sure you discuss any questions you have with your health care provider.  Document Released: 06/05/2009 Document Revised: 11/15/2017 Document Reviewed: 11/15/2017  [employer] Patient Education © 2019 [employer]

## 2019-05-10 RX ORDER — BLOOD SUGAR DIAGNOSTIC
STRIP MISCELLANEOUS
Qty: 300 EACH | Refills: 1 | Status: SHIPPED | OUTPATIENT
Start: 2019-05-10 | End: 2019-11-09 | Stop reason: SDUPTHER

## 2019-05-14 RX ORDER — NATEGLINIDE 120 MG/1
TABLET ORAL
Qty: 90 TABLET | Refills: 5 | Status: SHIPPED | OUTPATIENT
Start: 2019-05-14 | End: 2019-11-11 | Stop reason: SDUPTHER

## 2019-06-25 ENCOUNTER — OFFICE VISIT (OUTPATIENT)
Dept: FAMILY MEDICINE CLINIC | Facility: CLINIC | Age: 69
End: 2019-06-25

## 2019-06-25 VITALS
BODY MASS INDEX: 36.65 KG/M2 | DIASTOLIC BLOOD PRESSURE: 64 MMHG | HEART RATE: 65 BPM | TEMPERATURE: 97.7 F | WEIGHT: 256 LBS | SYSTOLIC BLOOD PRESSURE: 152 MMHG | OXYGEN SATURATION: 99 % | HEIGHT: 70 IN

## 2019-06-25 DIAGNOSIS — I10 ESSENTIAL HYPERTENSION: Primary | ICD-10-CM

## 2019-06-25 DIAGNOSIS — N18.30 CHRONIC KIDNEY DISEASE, STAGE III (MODERATE) (HCC): ICD-10-CM

## 2019-06-25 DIAGNOSIS — G47.33 OBSTRUCTIVE SLEEP APNEA SYNDROME: ICD-10-CM

## 2019-06-25 PROCEDURE — 99213 OFFICE O/P EST LOW 20 MIN: CPT | Performed by: PHYSICIAN ASSISTANT

## 2019-06-25 NOTE — PROGRESS NOTES
Subjective   Javier Torres is a 69 y.o. male here today for follow-up hypertension     History of Present Illness     Patient has been doing well without new complaints. He is tolerating medications.     The following portions of the patient's history were reviewed and updated as appropriate: allergies, current medications, past family history, past medical history, past social history, past surgical history and problem list.    Review of Systems   All other systems reviewed and are negative.      Objective   Physical Exam   Constitutional: He is oriented to person, place, and time. He appears well-developed.   HENT:   Head: Normocephalic and atraumatic.   Right Ear: External ear normal.   Left Ear: External ear normal.   Eyes: Conjunctivae are normal.   Neck: Carotid bruit is not present. No tracheal deviation present. No thyroid mass and no thyromegaly present.   Cardiovascular: Normal rate, regular rhythm, normal heart sounds and intact distal pulses.   Pulmonary/Chest: Effort normal and breath sounds normal.   Neurological: He is alert and oriented to person, place, and time. Gait normal.   Skin: Skin is warm and dry.   Psychiatric: He has a normal mood and affect. His behavior is normal. Judgment and thought content normal.   Nursing note and vitals reviewed.      Assessment/Plan   Javier was seen today for med management.    Diagnoses and all orders for this visit:    Essential hypertension    Obstructive sleep apnea syndrome    Chronic kidney disease, stage III (moderate) (CMS/HCC)      Patient Instructions   69 year old male who presents today in follow up of hypertension. Patient is managed by nephrology and cardiology. His BP was elevated, and when he saw Dr Humphrey, hydralazine 10 mg 3 x daily was added for BP. Patient has continued with elevated BP. He brings readings in today with some readings that are ok in the 110s -140s but some that are uncontrolled in 140s-150s. Patient will see nephrology later  this week. I asked that they discuss BP reading with nephrology and see if they can find out his BP goal. He was to follow up in 2 months from 5/1/19 appt. I will have him await nephrology appt for recommendations. They should also discuss with endocrinology the A1C goal for patient. He should be seen ASAP if worsening, new or changing symptoms or no improvement.

## 2019-07-10 RX ORDER — SITAGLIPTIN 50 MG/1
TABLET, FILM COATED ORAL
Qty: 90 TABLET | Refills: 0 | Status: SHIPPED | OUTPATIENT
Start: 2019-07-10 | End: 2019-10-14 | Stop reason: SDUPTHER

## 2019-07-19 DIAGNOSIS — M10.9 GOUT OF FOOT, UNSPECIFIED CAUSE, UNSPECIFIED CHRONICITY, UNSPECIFIED LATERALITY: ICD-10-CM

## 2019-07-19 RX ORDER — AMLODIPINE BESYLATE 10 MG/1
10 TABLET ORAL DAILY
Qty: 30 TABLET | Refills: 5 | Status: SHIPPED | OUTPATIENT
Start: 2019-07-19 | End: 2020-01-14

## 2019-07-24 DIAGNOSIS — M10.9 GOUT OF FOOT, UNSPECIFIED CAUSE, UNSPECIFIED CHRONICITY, UNSPECIFIED LATERALITY: ICD-10-CM

## 2019-07-24 RX ORDER — COLCHICINE 0.6 MG/1
0.6 TABLET ORAL DAILY
Qty: 30 TABLET | Refills: 2 | Status: SHIPPED | OUTPATIENT
Start: 2019-07-24 | End: 2019-10-19 | Stop reason: SDUPTHER

## 2019-07-29 DIAGNOSIS — I10 ESSENTIAL HYPERTENSION: ICD-10-CM

## 2019-07-30 RX ORDER — HYDRALAZINE HYDROCHLORIDE 10 MG/1
10 TABLET, FILM COATED ORAL 3 TIMES DAILY PRN
Qty: 90 TABLET | Refills: 2 | Status: SHIPPED | OUTPATIENT
Start: 2019-07-30 | End: 2019-10-21

## 2019-10-09 DIAGNOSIS — M10.9 GOUT OF FOOT, UNSPECIFIED CAUSE, UNSPECIFIED CHRONICITY, UNSPECIFIED LATERALITY: ICD-10-CM

## 2019-10-10 RX ORDER — RIVAROXABAN 20 MG/1
TABLET, FILM COATED ORAL
Qty: 90 TABLET | Refills: 0 | Status: SHIPPED | OUTPATIENT
Start: 2019-10-10 | End: 2020-01-08

## 2019-10-14 DIAGNOSIS — E11.21 TYPE 2 DIABETES WITH NEPHROPATHY (HCC): ICD-10-CM

## 2019-10-14 RX ORDER — SITAGLIPTIN 50 MG/1
TABLET, FILM COATED ORAL
Qty: 90 TABLET | Refills: 0 | Status: SHIPPED | OUTPATIENT
Start: 2019-10-14 | End: 2020-01-08

## 2019-10-14 RX ORDER — INSULIN DETEMIR 100 [IU]/ML
INJECTION, SOLUTION SUBCUTANEOUS
Qty: 36 ML | Refills: 0 | Status: SHIPPED | OUTPATIENT
Start: 2019-10-14 | End: 2019-10-21 | Stop reason: SDUPTHER

## 2019-10-18 NOTE — PROGRESS NOTES
Subjective   Javier Torres is a 69 y.o. male.     F/u for dm 2, hypertension, hyperlipidemia, CKD, vitamin d def / testing bs 3 x day / last dm eye exam 9/23/19  with dr Rivera/ last dm foot exam 4/16/19 with dr Meyers/ has not gotten flu vaccine         Patient has known diabetes mellitus for many years. He was started on insulin in 2014. He has been on Levemir 37 units at bedtime, Starlix 120 mg 3 times a day with meals, and Januvia 50 mg/day. Fasting blood sugar runs 129-219.  Lunchtime blood sugar runs between 156-312.  Suppertime blood sugars runs between 191-303.  He eats out with family 2-3 times a week. He normally eats lunch 3 days a week at a [employer] [facility]. He eats breakfast and supper at home. He denies any severe hypoglycemic episodes.  He has gained 7 pounds since 4/19.    He lives by himself and a cousin lives across the street. His last meal was 6:30 AM.      His last eye examination was in 9/19.   He has no history of retinopathy. He has chronic kidney disease and vitamin D deficiency and last saw Dr. Rubin in 4/18.  He is on vitamin D 1000 units daily.  He denies numbness, tingling, or burning sensation in his feet.      He has hyperlipidemia and has been on Lipitor 20 mg once a day. He denies any myalgia. He has no history of thyroid disease.       He has hypertension and is on Norvasc 10 mg once a day, hydralazine 10 mg 3 times a day and lisinopril 40 mg/day.  He has no previous history of heart attack or stroke.  He denies chest pain, shortness of breath or pedal edema.  Blood pressure at home runs between 130-164 systolic.    He had pneumococcal vaccination.  He has not had flu vac.    The following portions of the patient's history were reviewed and updated as appropriate: allergies, current medications, past family history, past medical history, past social history, past surgical history and problem list.    Review of Systems   Constitutional: Negative.    HENT: Negative.    Eyes: Negative.   "  Respiratory: Negative.    Cardiovascular: Positive for leg swelling. Negative for chest pain and palpitations.   Gastrointestinal: Negative.    Genitourinary: Negative.    Musculoskeletal: Negative.  Negative for arthralgias, back pain, myalgias and neck pain.   Neurological: Negative for dizziness, weakness and numbness.   Hematological: Negative.      ROS reviewed again  Objective      Vitals:    10/21/19 0950   BP: 144/58   BP Location: Left arm   Patient Position: Sitting   Cuff Size: Large Adult   Pulse: 62   SpO2: 98%   Weight: 117 kg (259 lb)   Height: 177.8 cm (70\")     Physical Exam   Constitutional: He is oriented to person, place, and time. He appears well-developed.   HENT:   Head: Normocephalic.   Nose: Nose normal.   Mouth/Throat: No oropharyngeal exudate.   Eyes: Conjunctivae and EOM are normal. Right eye exhibits no discharge. Left eye exhibits no discharge. No scleral icterus.   Neck: Neck supple. No JVD present. No tracheal deviation present. No thyromegaly present.   Cardiovascular: Normal rate, regular rhythm, normal heart sounds and intact distal pulses. Exam reveals no gallop and no friction rub.   No murmur heard.  Pulmonary/Chest: Effort normal and breath sounds normal. No stridor. No respiratory distress. He has no wheezes. He has no rales. He exhibits no tenderness.   Abdominal: Soft. Bowel sounds are normal. He exhibits no distension and no mass. There is no tenderness. There is no rebound and no guarding.   Musculoskeletal: Normal range of motion. He exhibits edema. He exhibits no tenderness or deformity.   Lymphadenopathy:     He has no cervical adenopathy.   Neurological: He is alert and oriented to person, place, and time. He displays normal reflexes.   Skin: Skin is warm and dry. No rash noted. No erythema.     Office Visit on 04/16/2019   Component Date Value Ref Range Status   • Glucose 04/16/2019 143* 65 - 99 mg/dL Final   • BUN 04/16/2019 34* 8 - 23 mg/dL Final   • Creatinine " 04/16/2019 2.00* 0.76 - 1.27 mg/dL Final   • eGFR Non African Am 04/16/2019 33* >60 mL/min/1.73 Final   • eGFR African Am 04/16/2019 40* >60 mL/min/1.73 Final   • BUN/Creatinine Ratio 04/16/2019 17.0  7.0 - 25.0 Final   • Sodium 04/16/2019 139  136 - 145 mmol/L Final   • Potassium 04/16/2019 5.3* 3.5 - 5.2 mmol/L Final   • Chloride 04/16/2019 103  98 - 107 mmol/L Final   • Total CO2 04/16/2019 23.2  22.0 - 29.0 mmol/L Final   • Calcium 04/16/2019 9.2  8.6 - 10.5 mg/dL Final   • Total Protein 04/16/2019 6.9  6.0 - 8.5 g/dL Final   • Albumin 04/16/2019 4.60  3.50 - 5.20 g/dL Final   • Globulin 04/16/2019 2.3  gm/dL Final   • A/G Ratio 04/16/2019 2.0  g/dL Final   • Total Bilirubin 04/16/2019 0.2  0.2 - 1.2 mg/dL Final   • Alkaline Phosphatase 04/16/2019 72  39 - 117 U/L Final   • AST (SGOT) 04/16/2019 21  1 - 40 U/L Final   • ALT (SGPT) 04/16/2019 26  1 - 41 U/L Final   • Total Cholesterol 04/16/2019 149  0 - 200 mg/dL Final   • Triglycerides 04/16/2019 192* 0 - 150 mg/dL Final   • HDL Cholesterol 04/16/2019 37* 40 - 60 mg/dL Final   • VLDL Cholesterol 04/16/2019 38.4  5 - 40 mg/dL Final   • LDL Cholesterol  04/16/2019 74  0 - 100 mg/dL Final   • Hemoglobin A1C 04/16/2019 8.43* 4.80 - 5.60 % Final    Comment: Hemoglobin A1C Ranges:  Increased Risk for Diabetes  5.7% to 6.4%  Diabetes                     >= 6.5%  Diabetic Goal                < 7.0%     • TSH 04/16/2019 2.010  0.270 - 4.200 mIU/mL Final   • 25 Hydroxy, Vitamin D 04/16/2019 34.3  30.0 - 100.0 ng/ml Final    Comment: Reference Range for Total Vitamin D 25(OH)  Deficiency <20.0 ng/mL  Insufficiency 21-29 ng/mL  Sufficiency  ng/mL  Toxicity >100 ng/ml     • Interpretation 04/16/2019 Note   Final    Supplemental report is available.   • PDF Image 04/16/2019 Not applicable   Final     Assessment/Plan   Javier was seen today for diabetes, hyperlipidemia, hypertension, vitamin d deficiency and chronic kidney disease.    Diagnoses and all orders for this  visit:    Essential hypertension  -     Comprehensive Metabolic Panel    Type 2 diabetes with nephropathy (CMS/HCC)  -     Comprehensive Metabolic Panel  -     Hemoglobin A1c  -     insulin detemir (LEVEMIR FLEXTOUCH) 100 UNIT/ML injection; Inject 40 Units under the skin into the appropriate area as directed Every Night.    Hyperlipidemia, unspecified hyperlipidemia type  -     Comprehensive Metabolic Panel  -     Lipid Panel    Obstructive sleep apnea syndrome  -     Comprehensive Metabolic Panel    Vitamin D deficiency  -     Comprehensive Metabolic Panel    Chronic kidney disease, stage III (moderate) (CMS/[facility])  -     Comprehensive Metabolic Panel      Increase Levemir to 40 units at bedtime  Continue Starlix 120 mg 3 times a day.  Continue Januvia 50 mg/day.  Continue amlodipine, lisinopril, hydralazine.  Will defer blood pressure control to Dr. Humphrey and Dr. Rubin  Continue vitamin D3 1000 units/day.  Continue CPAP.  Flu vaccine this fall.    Copy of my note sent to Dr. Humphrey and Dr. Rubin    RTC 4 mos

## 2019-10-19 DIAGNOSIS — M10.9 GOUT OF FOOT, UNSPECIFIED CAUSE, UNSPECIFIED CHRONICITY, UNSPECIFIED LATERALITY: ICD-10-CM

## 2019-10-21 ENCOUNTER — OFFICE VISIT (OUTPATIENT)
Dept: ENDOCRINOLOGY | Age: 69
End: 2019-10-21

## 2019-10-21 VITALS
OXYGEN SATURATION: 98 % | DIASTOLIC BLOOD PRESSURE: 58 MMHG | WEIGHT: 259 LBS | HEIGHT: 70 IN | HEART RATE: 62 BPM | SYSTOLIC BLOOD PRESSURE: 144 MMHG | BODY MASS INDEX: 37.08 KG/M2

## 2019-10-21 DIAGNOSIS — E55.9 VITAMIN D DEFICIENCY: ICD-10-CM

## 2019-10-21 DIAGNOSIS — E11.21 TYPE 2 DIABETES WITH NEPHROPATHY (HCC): ICD-10-CM

## 2019-10-21 DIAGNOSIS — N18.30 CHRONIC KIDNEY DISEASE, STAGE III (MODERATE) (HCC): ICD-10-CM

## 2019-10-21 DIAGNOSIS — I10 ESSENTIAL HYPERTENSION: Primary | ICD-10-CM

## 2019-10-21 DIAGNOSIS — E78.5 HYPERLIPIDEMIA, UNSPECIFIED HYPERLIPIDEMIA TYPE: ICD-10-CM

## 2019-10-21 DIAGNOSIS — G47.33 OBSTRUCTIVE SLEEP APNEA SYNDROME: ICD-10-CM

## 2019-10-21 LAB
ALBUMIN SERPL-MCNC: 4.4 G/DL (ref 3.5–5.2)
ALBUMIN/GLOB SERPL: 2.1 G/DL
ALP SERPL-CCNC: 51 U/L (ref 39–117)
ALT SERPL-CCNC: 25 U/L (ref 1–41)
AST SERPL-CCNC: 18 U/L (ref 1–40)
BILIRUB SERPL-MCNC: 0.3 MG/DL (ref 0.2–1.2)
BUN SERPL-MCNC: 45 MG/DL (ref 8–23)
BUN/CREAT SERPL: 20.7 (ref 7–25)
CALCIUM SERPL-MCNC: 9 MG/DL (ref 8.6–10.5)
CHLORIDE SERPL-SCNC: 103 MMOL/L (ref 98–107)
CHOLEST SERPL-MCNC: 154 MG/DL (ref 0–200)
CO2 SERPL-SCNC: 23.1 MMOL/L (ref 22–29)
CREAT SERPL-MCNC: 2.17 MG/DL (ref 0.76–1.27)
GLOBULIN SER CALC-MCNC: 2.1 GM/DL
GLUCOSE SERPL-MCNC: 145 MG/DL (ref 65–99)
HBA1C MFR BLD: 8.3 % (ref 4.8–5.6)
HDLC SERPL-MCNC: 35 MG/DL (ref 40–60)
INTERPRETATION: NORMAL
INTERPRETATION: NORMAL
LDLC SERPL CALC-MCNC: 70 MG/DL (ref 0–100)
Lab: NORMAL
Lab: NORMAL
POTASSIUM SERPL-SCNC: 5.3 MMOL/L (ref 3.5–5.2)
PROT SERPL-MCNC: 6.5 G/DL (ref 6–8.5)
SODIUM SERPL-SCNC: 140 MMOL/L (ref 136–145)
TRIGL SERPL-MCNC: 246 MG/DL (ref 0–150)
VLDLC SERPL CALC-MCNC: 49.2 MG/DL

## 2019-10-21 PROCEDURE — 99214 OFFICE O/P EST MOD 30 MIN: CPT | Performed by: INTERNAL MEDICINE

## 2019-10-21 RX ORDER — LISINOPRIL AND HYDROCHLOROTHIAZIDE 25; 20 MG/1; MG/1
TABLET ORAL
COMMUNITY
Start: 2019-09-30 | End: 2019-10-21 | Stop reason: SDUPTHER

## 2019-10-21 RX ORDER — HYDRALAZINE HYDROCHLORIDE 10 MG/1
10 TABLET, FILM COATED ORAL 3 TIMES DAILY
Qty: 90 TABLET | Refills: 2 | Status: SHIPPED | COMMUNITY
Start: 2019-10-21 | End: 2020-02-11 | Stop reason: SDUPTHER

## 2019-10-21 RX ORDER — MELATONIN
2000 DAILY
COMMUNITY
Start: 2019-10-21

## 2019-10-21 RX ORDER — COLCHICINE 0.6 MG/1
0.6 TABLET ORAL DAILY
Qty: 30 TABLET | Refills: 2 | Status: SHIPPED | OUTPATIENT
Start: 2019-10-21 | End: 2020-01-21

## 2019-10-23 ENCOUNTER — TELEPHONE (OUTPATIENT)
Dept: ENDOCRINOLOGY | Age: 69
End: 2019-10-23

## 2019-10-28 DIAGNOSIS — I10 ESSENTIAL HYPERTENSION: ICD-10-CM

## 2019-10-29 RX ORDER — HYDRALAZINE HYDROCHLORIDE 10 MG/1
10 TABLET, FILM COATED ORAL 3 TIMES DAILY PRN
Qty: 90 TABLET | Refills: 2 | OUTPATIENT
Start: 2019-10-29

## 2019-11-01 DIAGNOSIS — M10.9 GOUT OF FOOT, UNSPECIFIED CAUSE, UNSPECIFIED CHRONICITY, UNSPECIFIED LATERALITY: ICD-10-CM

## 2019-11-01 RX ORDER — ATORVASTATIN CALCIUM 20 MG/1
20 TABLET, FILM COATED ORAL NIGHTLY
Qty: 30 TABLET | Refills: 3 | Status: SHIPPED | OUTPATIENT
Start: 2019-11-01 | End: 2020-02-27

## 2019-11-04 DIAGNOSIS — I10 ESSENTIAL HYPERTENSION: ICD-10-CM

## 2019-11-04 RX ORDER — HYDRALAZINE HYDROCHLORIDE 10 MG/1
10 TABLET, FILM COATED ORAL 3 TIMES DAILY PRN
Qty: 90 TABLET | Refills: 2 | OUTPATIENT
Start: 2019-11-04

## 2019-11-11 RX ORDER — BLOOD SUGAR DIAGNOSTIC
STRIP MISCELLANEOUS
Qty: 300 EACH | Refills: 1 | Status: SHIPPED | OUTPATIENT
Start: 2019-11-11 | End: 2020-05-29

## 2019-11-12 RX ORDER — NATEGLINIDE 120 MG/1
TABLET ORAL
Qty: 90 TABLET | Refills: 5 | Status: SHIPPED | OUTPATIENT
Start: 2019-11-12 | End: 2020-02-24 | Stop reason: ALTCHOICE

## 2019-11-18 DIAGNOSIS — K21.9 GASTROESOPHAGEAL REFLUX DISEASE, ESOPHAGITIS PRESENCE NOT SPECIFIED: ICD-10-CM

## 2019-11-18 RX ORDER — RANITIDINE 150 MG/1
150 TABLET ORAL 2 TIMES DAILY PRN
Qty: 60 TABLET | Refills: 0 | Status: SHIPPED | OUTPATIENT
Start: 2019-11-18 | End: 2020-01-14

## 2020-01-07 DIAGNOSIS — E11.21 TYPE 2 DIABETES WITH NEPHROPATHY (HCC): ICD-10-CM

## 2020-01-07 DIAGNOSIS — M10.9 GOUT OF FOOT, UNSPECIFIED CAUSE, UNSPECIFIED CHRONICITY, UNSPECIFIED LATERALITY: ICD-10-CM

## 2020-01-08 RX ORDER — RIVAROXABAN 20 MG/1
TABLET, FILM COATED ORAL
Qty: 90 TABLET | Refills: 0 | Status: SHIPPED | OUTPATIENT
Start: 2020-01-08 | End: 2020-03-31

## 2020-01-08 RX ORDER — SITAGLIPTIN 50 MG/1
TABLET, FILM COATED ORAL
Qty: 90 TABLET | Refills: 1 | Status: SHIPPED | OUTPATIENT
Start: 2020-01-08 | End: 2020-02-24 | Stop reason: ALTCHOICE

## 2020-01-13 DIAGNOSIS — K21.9 GASTROESOPHAGEAL REFLUX DISEASE, ESOPHAGITIS PRESENCE NOT SPECIFIED: ICD-10-CM

## 2020-01-13 DIAGNOSIS — M10.9 GOUT OF FOOT, UNSPECIFIED CAUSE, UNSPECIFIED CHRONICITY, UNSPECIFIED LATERALITY: ICD-10-CM

## 2020-01-14 RX ORDER — AMLODIPINE BESYLATE 10 MG/1
10 TABLET ORAL DAILY
Qty: 30 TABLET | Refills: 0 | Status: SHIPPED | OUTPATIENT
Start: 2020-01-14 | End: 2020-02-11 | Stop reason: SDUPTHER

## 2020-01-14 RX ORDER — RANITIDINE 150 MG/1
150 TABLET ORAL 2 TIMES DAILY PRN
Qty: 60 TABLET | Refills: 0 | Status: SHIPPED | OUTPATIENT
Start: 2020-01-14 | End: 2020-03-10 | Stop reason: SDUPTHER

## 2020-01-21 DIAGNOSIS — M10.9 GOUT OF FOOT, UNSPECIFIED CAUSE, UNSPECIFIED CHRONICITY, UNSPECIFIED LATERALITY: ICD-10-CM

## 2020-01-21 RX ORDER — COLCHICINE 0.6 MG/1
0.6 TABLET ORAL DAILY
Qty: 30 TABLET | Refills: 0 | Status: SHIPPED | OUTPATIENT
Start: 2020-01-21 | End: 2020-02-20

## 2020-02-11 ENCOUNTER — OFFICE VISIT (OUTPATIENT)
Dept: FAMILY MEDICINE CLINIC | Facility: CLINIC | Age: 70
End: 2020-02-11

## 2020-02-11 VITALS
HEIGHT: 70 IN | RESPIRATION RATE: 17 BRPM | BODY MASS INDEX: 36.42 KG/M2 | OXYGEN SATURATION: 98 % | SYSTOLIC BLOOD PRESSURE: 132 MMHG | DIASTOLIC BLOOD PRESSURE: 68 MMHG | WEIGHT: 254.4 LBS | TEMPERATURE: 98.2 F | HEART RATE: 82 BPM

## 2020-02-11 DIAGNOSIS — E78.5 HYPERLIPIDEMIA, UNSPECIFIED HYPERLIPIDEMIA TYPE: ICD-10-CM

## 2020-02-11 DIAGNOSIS — I10 ESSENTIAL HYPERTENSION: Primary | ICD-10-CM

## 2020-02-11 DIAGNOSIS — M10.9 GOUT OF FOOT, UNSPECIFIED CAUSE, UNSPECIFIED CHRONICITY, UNSPECIFIED LATERALITY: ICD-10-CM

## 2020-02-11 PROCEDURE — 99214 OFFICE O/P EST MOD 30 MIN: CPT | Performed by: FAMILY MEDICINE

## 2020-02-11 RX ORDER — LISINOPRIL 40 MG/1
TABLET ORAL
Qty: 90 TABLET | Refills: 3 | Status: SHIPPED | OUTPATIENT
Start: 2020-02-11 | End: 2020-02-26

## 2020-02-11 RX ORDER — AMLODIPINE BESYLATE 10 MG/1
10 TABLET ORAL DAILY
Qty: 90 TABLET | Refills: 3 | Status: SHIPPED | OUTPATIENT
Start: 2020-02-11 | End: 2021-03-01

## 2020-02-11 RX ORDER — HYDRALAZINE HYDROCHLORIDE 10 MG/1
10 TABLET, FILM COATED ORAL 3 TIMES DAILY
Qty: 90 TABLET | Refills: 3 | Status: SHIPPED | OUTPATIENT
Start: 2020-02-11 | End: 2020-02-25

## 2020-02-11 NOTE — PROGRESS NOTES
Subjective   Javier Torres is a 69 y.o. male. Presents today for med management blood pressure.     History of Present Illness     Hypertension - Stable.  Taking the lisinopril 40mg daily, amlodipine 10mg, hydralazine 10mg TID.  Denies chest pain, shortness of breath, headache, lower extremity edema.  Stage 3 chronic kidney disease history.  Seen recently by kidney doctor.    HLD-stable.  Patient taking atorvastatin 20mg as prescribed.  Trying to adhere to a low-fat diet.    Gout-stable.  Patient taking colchicine as needed which has been reviewed by the kidney doctor and is okay.  The patient does not have very many gout attacks but this medicine helps him out a lot when he does.    The following portions of the patient's history were reviewed and updated as appropriate: allergies, current medications, past family history, past medical history, past social history, past surgical history and problem list.    Review of Systems   Constitutional: Negative for activity change, appetite change, fatigue and fever.   HENT: Negative for ear pain, facial swelling and sore throat.    Eyes: Negative for discharge and itching.   Respiratory: Negative for cough, chest tightness and shortness of breath.    Cardiovascular: Negative for chest pain and palpitations.   Gastrointestinal: Negative for abdominal distention and constipation.   Endocrine: Negative for polydipsia, polyphagia and polyuria.   Genitourinary: Negative for difficulty urinating and flank pain.   Musculoskeletal: Negative for arthralgias and back pain.   Skin: Negative for color change, rash and wound.   Allergic/Immunologic: Negative for environmental allergies and food allergies.   Neurological: Negative for weakness and numbness.   Hematological: Negative for adenopathy. Does not bruise/bleed easily.   Psychiatric/Behavioral: Negative for decreased concentration and dysphoric mood. The patient is not nervous/anxious.        Objective   Physical Exam    Constitutional: He is oriented to person, place, and time. He appears well-developed and well-nourished. No distress.   HENT:   Mouth/Throat: Oropharynx is clear and moist. No oropharyngeal exudate.   Eyes: Conjunctivae are normal. Right eye exhibits no discharge. Left eye exhibits no discharge.   Neck: Normal range of motion. Neck supple.   Cardiovascular: Normal rate, regular rhythm and normal heart sounds. Exam reveals no gallop and no friction rub.   No murmur heard.  Pulmonary/Chest: Effort normal and breath sounds normal. He has no wheezes. He has no rales.   Abdominal: Soft. Bowel sounds are normal. He exhibits no distension. There is no tenderness.   Musculoskeletal: He exhibits no edema or deformity.   Lymphadenopathy:     He has no cervical adenopathy.   Neurological: He is alert and oriented to person, place, and time.   Skin: Skin is warm and dry. No rash noted.   Psychiatric: He has a normal mood and affect. His behavior is normal.   Nursing note and vitals reviewed.  I have reviewed the HPI, review of systems, and physical exam and they are all accurate with the patient and for this encounter.          Assessment/Plan   Javier was seen today for med management.    Diagnoses and all orders for this visit:    Essential hypertension  -     amLODIPine (NORVASC) 10 MG tablet; Take 1 tablet by mouth Daily.  -     hydrALAZINE (APRESOLINE) 10 MG tablet; Take 1 tablet by mouth 3 (Three) Times a Day.  -     lisinopril (PRINIVIL,ZESTRIL) 40 MG tablet; 1 tablet daily    Gout of foot, unspecified cause, unspecified chronicity, unspecified laterality    Hyperlipidemia, unspecified hyperlipidemia type      Continue blood pressure medications and refilled all 3 of those.  I reviewed the kidney specialist note in regards to the status of his kidneys and the labs that were completed recently.  I am okay with refilling his colchicine and cholesterol medication as soon as he is ready for it.  All 3 conditions appear to  be stable.

## 2020-02-11 NOTE — PATIENT INSTRUCTIONS
Chronic Kidney Disease, Adult  Chronic kidney disease (CKD) happens when the kidneys are damaged over a long period of time. The kidneys are two organs that help with:  · Getting rid of waste and extra fluid from the blood.  · Making hormones that maintain the amount of fluid in your tissues and blood vessels.  · Making sure that the body has the right amount of fluids and chemicals.  Most of the time, CKD does not go away, but it can usually be controlled. Steps must be taken to slow down the kidney damage or to stop it from getting worse. If this is not done, the kidneys may stop working.  Follow these instructions at home:  Medicines  · Take over-the-counter and prescription medicines only as told by your doctor. You may need to change the amount of medicines you take.  · Do not take any new medicines unless your doctor says it is okay. Many medicines can make your kidney damage worse.  · Do not take any vitamin and supplements unless your doctor says it is okay. Many vitamins and supplements can make your kidney damage worse.  General instructions  · Follow a diet as told by your doctor. You may need to stay away from:  ? Alcohol.  ? Salty foods.  ? Foods that are high in:  § Potassium.  § Calcium.  § Protein.  · Do not use any products that contain nicotine or tobacco, such as cigarettes and e-cigarettes. If you need help quitting, ask your doctor.  · Keep track of your blood pressure at home. Tell your doctor about any changes.  · If you have diabetes, keep track of your blood sugar as told by your doctor.  · Try to stay at a healthy weight. If you need help, ask your doctor.  · Exercise at least 30 minutes a day, 5 days a week.  · Stay up-to-date with your shots (immunizations) as told by your doctor.  · Keep all follow-up visits as told by your doctor. This is important.  Contact a doctor if:  · Your symptoms get worse.  · You have new symptoms.  Get help right away if:  · You have symptoms of end-stage  kidney disease. These may include:  ? Headaches.  ? Numbness in your hands or feet.  ? Easy bruising.  ? Having hiccups often.  ? Chest pain.  ? Shortness of breath.  ? Stopping of menstrual periods in women.  · You have a fever.  · You have very little pee (urine).  · You have pain or bleeding when you pee.  Summary  · Chronic kidney disease (CKD) happens when the kidneys are damaged over a long period of time.  · Most of the time, this condition does not go away, but it can usually be controlled. Steps must be taken to slow down the kidney damage or to stop it from getting worse.  · Treatment may include a combination of medicines and lifestyle changes.  This information is not intended to replace advice given to you by your health care provider. Make sure you discuss any questions you have with your health care provider.  Document Released: 03/14/2011 Document Revised: 01/22/2018 Document Reviewed: 01/22/2018  [employer] Patient Education © 2019 [employer]

## 2020-02-17 NOTE — PROGRESS NOTES
Subjective   Javier Torres is a 69 y.o. male.     F/u for dm 2, hypertension, hyperlipidemia, CKD,vitamin d def / testing bs 3 x day / last dm eye exam 4/16/19 with dr Meyers / last dm foot exam today with dr Meyers / flu vaccine @[facility]      Patient has known diabetes mellitus for many years. He was started on insulin in 2014. He has been on Levemir 40 units at bedtime, Starlix 120 mg 3 times a day with meals, and Januvia 50 mg/day.  Fasting glucose 159-269.  Lunchtime glucose 204-329.  Suppertime glucose 221-337.. He normally eats lunch 3 days a week at a senior center. He eats breakfast and supper at home. He denies any severe hypoglycemic episodes.  He has gained 2 pounds since October 2019.    He lives by himself and a cousin lives across the street. His last meal was 6 months any problem with URIs AM.      His last eye examination was in 9/19.   He has no history of retinopathy. He has chronic kidney disease and vitamin D deficiency and last saw Dr. Rubin in 1/20.  He is on vitamin D 1000 units daily.  He denies numbness, tingling, or burning sensation in his feet.      He has hyperlipidemia and has been on Lipitor 20 mg once a day. He denies any myalgia. He has no history of thyroid disease.       He has hypertension and is on Norvasc 10 mg once a day, hydralazine 10 mg 3 times a day and lisinopril 40 mg/day.  He has no previous history of heart attack or stroke.  He denies chest pain, shortness of breath or pedal edema.     The following portions of the patient's history were reviewed and updated as appropriate: allergies, current medications, past family history, past medical history, past social history, past surgical history and problem list.    Review of Systems   Constitutional: Negative.    HENT: Negative.    Eyes: Negative.    Respiratory: Negative.    Cardiovascular: Negative.    Gastrointestinal: Negative.    Endocrine: Negative.    Genitourinary: Negative.    Neurological: Negative for weakness and  "numbness.   Psychiatric/Behavioral: Positive for sleep disturbance (sleep apnea using CPAP machine ).     Objective      Vitals:    02/24/20 1121   BP: 152/62   BP Location: Left arm   Patient Position: Sitting   Cuff Size: Large Adult   Pulse: 72   SpO2: 98%   Weight: 119 kg (261 lb 9.6 oz)   Height: 177.8 cm (70\")     Physical Exam   Constitutional: He is oriented to person, place, and time. He appears well-developed and well-nourished. No distress.   HENT:   Head: Normocephalic.   Right Ear: External ear normal.   Left Ear: External ear normal.   Nose: Nose normal.   Mouth/Throat: Oropharynx is clear and moist. No oropharyngeal exudate.   Eyes: Conjunctivae and EOM are normal. Right eye exhibits no discharge. Left eye exhibits no discharge. No scleral icterus.   Neck: Neck supple. No JVD present. No tracheal deviation present. No thyromegaly present.   Cardiovascular: Normal rate, regular rhythm, normal heart sounds and intact distal pulses. Exam reveals no gallop and no friction rub.   No murmur heard.  Pulmonary/Chest: Effort normal and breath sounds normal. No stridor. No respiratory distress. He has no wheezes. He has no rales. He exhibits no tenderness.   Abdominal: Soft. Bowel sounds are normal. He exhibits no distension and no mass. There is no tenderness. There is no guarding.   Musculoskeletal: Normal range of motion. He exhibits no edema, tenderness or deformity.   Lymphadenopathy:     He has no cervical adenopathy.   Neurological: He is alert and oriented to person, place, and time. He displays normal reflexes. Coordination normal.     Office Visit on 10/21/2019   Component Date Value Ref Range Status   • Glucose 10/21/2019 145* 65 - 99 mg/dL Final   • BUN 10/21/2019 45* 8 - 23 mg/dL Final   • Creatinine 10/21/2019 2.17* 0.76 - 1.27 mg/dL Final   • eGFR Non African Am 10/21/2019 30* >60 mL/min/1.73 Final   • eGFR African Am 10/21/2019 37* >60 mL/min/1.73 Final   • BUN/Creatinine Ratio 10/21/2019 20.7  " 7.0 - 25.0 Final   • Sodium 10/21/2019 140  136 - 145 mmol/L Final   • Potassium 10/21/2019 5.3* 3.5 - 5.2 mmol/L Final   • Chloride 10/21/2019 103  98 - 107 mmol/L Final   • Total CO2 10/21/2019 23.1  22.0 - 29.0 mmol/L Final   • Calcium 10/21/2019 9.0  8.6 - 10.5 mg/dL Final   • Total Protein 10/21/2019 6.5  6.0 - 8.5 g/dL Final   • Albumin 10/21/2019 4.40  3.50 - 5.20 g/dL Final   • Globulin 10/21/2019 2.1  gm/dL Final   • A/G Ratio 10/21/2019 2.1  g/dL Final   • Total Bilirubin 10/21/2019 0.3  0.2 - 1.2 mg/dL Final   • Alkaline Phosphatase 10/21/2019 51  39 - 117 U/L Final   • AST (SGOT) 10/21/2019 18  1 - 40 U/L Final   • ALT (SGPT) 10/21/2019 25  1 - 41 U/L Final   • Total Cholesterol 10/21/2019 154  0 - 200 mg/dL Final   • Triglycerides 10/21/2019 246* 0 - 150 mg/dL Final   • HDL Cholesterol 10/21/2019 35* 40 - 60 mg/dL Final   • VLDL Cholesterol 10/21/2019 49.2  mg/dL Final   • LDL Cholesterol  10/21/2019 70  0 - 100 mg/dL Final   • Hemoglobin A1C 10/21/2019 8.30* 4.80 - 5.60 % Final    Comment: Hemoglobin A1C Ranges:  Increased Risk for Diabetes  5.7% to 6.4%  Diabetes                     >= 6.5%  Diabetic Goal                < 7.0%     • Interpretation 10/21/2019 Note   Final    Comment: -------------------------------  CHRONIC KIDNEY DISEASE:  EGFR, BLOOD PRESSURE, AND PROTEINURIA ASSESSMENT  Estimated GFR is falling significantly with time; annualized  rate of decline (all values) is -6.2 mL/min/year, p = 0.01.  Rapid progression of CKD (greater than 5  mL/min/1.73m  and mortality. Evaluate for reversible causes and consider  nephrology referral. Current eGFR is 30 mL/min/1.73mE2  corresponding to CKD stage 3b. Multiply eGFR by 1.159 if  patient is . Potassium is above goal and has  not changed significantly, was 5.3 and now is 5.3 mmol/L.  Glycemic control (HB A1c: 8.3 %) is not within goal and  additional action is indicated.  EGFR, BLOOD PRESSURE, AND PROTEINURIA TREATMENT  SUGGESTIONS  -  Guidelines recommend a target blood pressure of 140/90 mmHg  or less in CKD patients to reduce cardiovascular risk and  CKD progression. A higher blood pressure target may be  appropriate in older individuals to avoid symptomatic  hypotension. ACEI or ARB may incr                           ease serum potassium.  Maintain low potassium diet and avoid salt substitutes and  potassium retaining medications (e.g. NSAIDS, potassium  sparing diuretics, trimethoprim/sulfa). Assessment of  albuminuria (urine albumin:creatinine ratio or urine  protein:creatinine ratio preferred) is recommended at least  annually in CKD patients for staging and disease prognosis.  EGFR, BLOOD PRESSURE, AND PROTEINURIA FOLLOW-UP  -  Spot Urine Panel (Albumin preferred) is recommended by KDOQI  guidelines, at least yearly; fasting Renal Panel within 1  month; Hemoglobin A1C within 3 months;  -  BONE and MINERAL ASSESSMENT  Calcium is within goal and has not changed significantly,  was 9.2 and now is 9.0 mg/dL. Carbon Dioxide is within goal  and has not changed significantly, was 23 and now is 23  mmol/L.  BONE and MINERAL TREATMENT SUGGESTIONS  -  Interpretations require simultaneous measurements of serum  calcium and phosphorus.  BONE and MINERAL FOLLOW-UP  -  fasting PTH with Renal Panel is recomme                           nded by KDOQI  guidelines, at least yearly; 25-Hydroxy Vitamin D within 6  months;  -  LIPIDS ASSESSMENT  Blood was non-fasting; interpret these results with caution.  LDL-C is normal and has not changed significantly, was 74  and now is 70 mg/dL. Triglyceride is high and has risen, was  192 and now is 246 mg/dL. Non-HDL Cholesterol is normal and  has not changed significantly, was 112 and now is 119 mg/dL.  HDL-C is low and has not changed significantly, was 37 and  now is 35 mg/dL.  LIPIDS TREATMENT SUGGESTIONS  -  Therapeutic lifestyle changes are always valuable to  maintain optimal blood lipid status  (diet, exercise, weight  management). If at least a 50% LDL reduction from baseline  has not been achieved, begin or increase statin. Consider  measurement of LDL particle number or Apo B to adjudicate  need for further LDL lowering therapy. If statin cannot be  tolerated or increased, alternatives include use of an  intestinal agent (ezetimibe or bile acid sequestrant),  niacin, and/                           or fish oil.  LIPIDS FOLLOW-UP  -  fasting Lipid Panel within 12 months;  -  ANEMIA ASSESSMENT  Most recent order does not include a CBC Panel or iron  studies.  ANEMIA FOLLOW-UP  -  CBC is recommended by KDOQI guidelines, at least yearly;  -------------------------------  DISCLAIMER  These assessments and treatment suggestions are provided as  a convenience in support of the physician-patient  relationship and are not intended to replace the physician's  clinical judgment. They are derived from national guidelines  in addition to other evidence and expert opinion. The  clinician should consider this information within the  context of clinical opinion and the individual patient.  SEE GUIDANCE FOR CHRONIC KIDNEY DISEASE PROGRAM: Kidney  Disease Improving Global Outcomes (KDIGO) clinical practice  guidelines are at http://kdigo.org/home/guidelines/.  National Kid[employer] Kidney Disease Outcomes Quality  Initiative (KDOQI (TM)), with its limitations and  disclaimers, are at www.kidn                           ey.org/professionals/KDOQI. This  program is intended for patients who have been diagnosed  with stages 3, 4, or pre-dialysis 5 CKD. It is not intended  for children, pregnant patients, or transplant patients.     • Interpretation 10/21/2019 Note   Final    Supplemental report is available.   • PDF Image 10/21/2019 Not applicable   Final   • PDF Image 10/21/2019 Not applicable   Final     Assessment/Plan   Javier was seen today for diabetes, hyperlipidemia, hypertension, vitamin d deficiency and  chronic kidney disease.    Diagnoses and all orders for this visit:    Type 2 diabetes with nephropathy (CMS/[facility])  -     insulin detemir (LEVEMIR FLEXTOUCH) 100 UNIT/ML injection; Inject 42 Units under the skin into the appropriate area as directed Every Night.  -     Comprehensive Metabolic Panel  -     Hemoglobin A1c  -     Microalbumin / Creatinine Urine Ratio - Urine, Clean Catch  -     TSH    Type 2 diabetes mellitus with diabetic neuropathy, with long-term current use of insulin (CMS/[facility])  -     Comprehensive Metabolic Panel  -     Hemoglobin A1c  -     Microalbumin / Creatinine Urine Ratio - Urine, Clean Catch  -     TSH    Essential hypertension  -     Comprehensive Metabolic Panel    Hyperlipidemia, unspecified hyperlipidemia type  -     Comprehensive Metabolic Panel  -     Lipid Panel  -     TSH    Obstructive sleep apnea syndrome  -     Comprehensive Metabolic Panel  -     TSH    Vitamin D deficiency  -     Comprehensive Metabolic Panel  -     Vitamin D 25 Hydroxy    Chronic kidney disease, stage III (moderate) (CMS/[facility])  -     Comprehensive Metabolic Panel  -     Vitamin D 25 Hydroxy    Other orders  -     insulin aspart (NOVOLOG FLEXPEN) 100 UNIT/ML solution pen-injector sc pen; Inject 5 Units under the skin into the appropriate area as directed 3 (Three) Times a Day With Meals.      Increase Levemir to 42 units every evening.  Discontinue Starlix and Januvia.  Start NovoLog 5 units before each meal.  Continue amlodipine, hydralazine, and lisinopril.  Continue Lipitor 20 mg/day  Continue CPAP  Continue vitamin D 1000 units/day    Copy of my note sent to Dr. Humphrey and Dr. Rubin    RTC 4 mos

## 2020-02-19 DIAGNOSIS — M10.9 GOUT OF FOOT, UNSPECIFIED CAUSE, UNSPECIFIED CHRONICITY, UNSPECIFIED LATERALITY: ICD-10-CM

## 2020-02-20 RX ORDER — COLCHICINE 0.6 MG/1
0.6 TABLET ORAL DAILY
Qty: 30 TABLET | Refills: 2 | Status: SHIPPED | OUTPATIENT
Start: 2020-02-20 | End: 2020-05-14

## 2020-02-24 ENCOUNTER — OFFICE VISIT (OUTPATIENT)
Dept: ENDOCRINOLOGY | Age: 70
End: 2020-02-24

## 2020-02-24 VITALS
SYSTOLIC BLOOD PRESSURE: 152 MMHG | HEIGHT: 70 IN | OXYGEN SATURATION: 98 % | WEIGHT: 261.6 LBS | BODY MASS INDEX: 37.45 KG/M2 | DIASTOLIC BLOOD PRESSURE: 62 MMHG | HEART RATE: 72 BPM

## 2020-02-24 DIAGNOSIS — E11.21 TYPE 2 DIABETES WITH NEPHROPATHY (HCC): Primary | ICD-10-CM

## 2020-02-24 DIAGNOSIS — E11.40 TYPE 2 DIABETES MELLITUS WITH DIABETIC NEUROPATHY, WITH LONG-TERM CURRENT USE OF INSULIN (HCC): ICD-10-CM

## 2020-02-24 DIAGNOSIS — Z79.4 TYPE 2 DIABETES MELLITUS WITH DIABETIC NEUROPATHY, WITH LONG-TERM CURRENT USE OF INSULIN (HCC): ICD-10-CM

## 2020-02-24 DIAGNOSIS — I10 ESSENTIAL HYPERTENSION: ICD-10-CM

## 2020-02-24 DIAGNOSIS — G47.33 OBSTRUCTIVE SLEEP APNEA SYNDROME: ICD-10-CM

## 2020-02-24 DIAGNOSIS — N18.30 CHRONIC KIDNEY DISEASE, STAGE III (MODERATE) (HCC): ICD-10-CM

## 2020-02-24 DIAGNOSIS — E78.5 HYPERLIPIDEMIA, UNSPECIFIED HYPERLIPIDEMIA TYPE: ICD-10-CM

## 2020-02-24 DIAGNOSIS — E55.9 VITAMIN D DEFICIENCY: ICD-10-CM

## 2020-02-24 PROCEDURE — 99214 OFFICE O/P EST MOD 30 MIN: CPT | Performed by: INTERNAL MEDICINE

## 2020-02-25 LAB
25(OH)D3+25(OH)D2 SERPL-MCNC: 29.4 NG/ML (ref 30–100)
ALBUMIN SERPL-MCNC: 4.4 G/DL (ref 3.8–4.8)
ALBUMIN/CREAT UR: 13 MG/G CREAT (ref 0–29)
ALBUMIN/GLOB SERPL: 2.1 {RATIO} (ref 1.2–2.2)
ALP SERPL-CCNC: 56 IU/L (ref 39–117)
ALT SERPL-CCNC: 22 IU/L (ref 0–44)
AST SERPL-CCNC: 20 IU/L (ref 0–40)
BILIRUB SERPL-MCNC: <0.2 MG/DL (ref 0–1.2)
BUN SERPL-MCNC: 50 MG/DL (ref 8–27)
BUN/CREAT SERPL: 21 (ref 10–24)
CALCIUM SERPL-MCNC: 9.4 MG/DL (ref 8.6–10.2)
CHLORIDE SERPL-SCNC: 106 MMOL/L (ref 96–106)
CHOLEST SERPL-MCNC: 163 MG/DL (ref 100–199)
CO2 SERPL-SCNC: 18 MMOL/L (ref 20–29)
CREAT SERPL-MCNC: 2.39 MG/DL (ref 0.76–1.27)
CREAT UR-MCNC: 65.3 MG/DL
GLOBULIN SER CALC-MCNC: 2.1 G/DL (ref 1.5–4.5)
GLUCOSE SERPL-MCNC: 176 MG/DL (ref 65–99)
HBA1C MFR BLD: 10.4 % (ref 4.8–5.6)
HDLC SERPL-MCNC: 34 MG/DL
INTERPRETATION: NORMAL
INTERPRETATION: NORMAL
LDLC SERPL CALC-MCNC: 80 MG/DL (ref 0–99)
Lab: NORMAL
Lab: NORMAL
MICROALBUMIN UR-MCNC: 8.3 UG/ML
POTASSIUM SERPL-SCNC: 5.9 MMOL/L (ref 3.5–5.2)
PROT SERPL-MCNC: 6.5 G/DL (ref 6–8.5)
SODIUM SERPL-SCNC: 140 MMOL/L (ref 134–144)
TRIGL SERPL-MCNC: 246 MG/DL (ref 0–149)
TSH SERPL DL<=0.005 MIU/L-ACNC: 2.53 UIU/ML (ref 0.45–4.5)
VLDLC SERPL CALC-MCNC: 49 MG/DL (ref 5–40)

## 2020-02-25 RX ORDER — HYDRALAZINE HYDROCHLORIDE 10 MG/1
TABLET, FILM COATED ORAL
Qty: 90 TABLET | Refills: 2 | Status: SHIPPED | OUTPATIENT
Start: 2020-02-25 | End: 2020-02-26 | Stop reason: SDUPTHER

## 2020-02-26 DIAGNOSIS — I10 ESSENTIAL HYPERTENSION: ICD-10-CM

## 2020-02-26 DIAGNOSIS — M10.9 GOUT OF FOOT, UNSPECIFIED CAUSE, UNSPECIFIED CHRONICITY, UNSPECIFIED LATERALITY: ICD-10-CM

## 2020-02-26 RX ORDER — HYDRALAZINE HYDROCHLORIDE 25 MG/1
25 TABLET, FILM COATED ORAL 3 TIMES DAILY
Qty: 270 TABLET | Refills: 1 | Status: SHIPPED | OUTPATIENT
Start: 2020-02-26 | End: 2020-06-02

## 2020-02-27 RX ORDER — ATORVASTATIN CALCIUM 20 MG/1
TABLET, FILM COATED ORAL
Qty: 30 TABLET | Refills: 0 | Status: SHIPPED | OUTPATIENT
Start: 2020-02-27 | End: 2020-03-23

## 2020-03-10 ENCOUNTER — OFFICE VISIT (OUTPATIENT)
Dept: FAMILY MEDICINE CLINIC | Facility: CLINIC | Age: 70
End: 2020-03-10

## 2020-03-10 VITALS
OXYGEN SATURATION: 99 % | HEIGHT: 70 IN | SYSTOLIC BLOOD PRESSURE: 158 MMHG | HEART RATE: 102 BPM | WEIGHT: 250.4 LBS | RESPIRATION RATE: 17 BRPM | DIASTOLIC BLOOD PRESSURE: 80 MMHG | TEMPERATURE: 97.8 F | BODY MASS INDEX: 35.85 KG/M2

## 2020-03-10 DIAGNOSIS — K21.9 GASTROESOPHAGEAL REFLUX DISEASE, ESOPHAGITIS PRESENCE NOT SPECIFIED: ICD-10-CM

## 2020-03-10 DIAGNOSIS — I10 ESSENTIAL HYPERTENSION: Primary | ICD-10-CM

## 2020-03-10 PROCEDURE — 99214 OFFICE O/P EST MOD 30 MIN: CPT | Performed by: FAMILY MEDICINE

## 2020-03-10 RX ORDER — RANITIDINE 150 MG/1
150 TABLET ORAL 2 TIMES DAILY PRN
Qty: 180 TABLET | Refills: 3 | Status: SHIPPED | OUTPATIENT
Start: 2020-03-10 | End: 2020-03-10

## 2020-03-10 RX ORDER — RANITIDINE 150 MG/1
150 TABLET ORAL 2 TIMES DAILY PRN
Qty: 60 TABLET | Refills: 2 | Status: SHIPPED | OUTPATIENT
Start: 2020-03-10 | End: 2020-08-28 | Stop reason: SDUPTHER

## 2020-03-10 NOTE — PATIENT INSTRUCTIONS
Hypertension, Adult  Hypertension is another name for high blood pressure. High blood pressure forces your heart to work harder to pump blood. This can cause problems over time.  There are two numbers in a blood pressure reading. There is a top number (systolic) over a bottom number (diastolic). It is best to have a blood pressure that is below 120/80. Healthy choices can help lower your blood pressure, or you may need medicine to help lower it.  What are the causes?  The cause of this condition is not known. Some conditions may be related to high blood pressure.  What increases the risk?  · Smoking.  · Having type 2 diabetes mellitus, high cholesterol, or both.  · Not getting enough exercise or physical activity.  · Being overweight.  · Having too much fat, sugar, calories, or salt (sodium) in your diet.  · Drinking too much alcohol.  · Having long-term (chronic) kidney disease.  · Having a family history of high blood pressure.  · Age. Risk increases with age.  · Race. You may be at higher risk if you are .  · Gender. Men are at higher risk than women before age 45. After age 65, women are at higher risk than men.  · Having obstructive sleep apnea.  · Stress.  What are the signs or symptoms?  · High blood pressure may not cause symptoms. Very high blood pressure (hypertensive crisis) may cause:  ? Headache.  ? Feelings of worry or nervousness (anxiety).  ? Shortness of breath.  ? Nosebleed.  ? A feeling of being sick to your stomach (nausea).  ? Throwing up (vomiting).  ? Changes in how you see.  ? Very bad chest pain.  ? Seizures.  How is this treated?  · This condition is treated by making healthy lifestyle changes, such as:  ? Eating healthy foods.  ? Exercising more.  ? Drinking less alcohol.  · Your health care provider may prescribe medicine if lifestyle changes are not enough to get your blood pressure under control, and if:  ? Your top number is above 130.  ? Your bottom number is above  80.  · Your personal target blood pressure may vary.  Follow these instructions at home:  Eating and drinking    · If told, follow the DASH eating plan. To follow this plan:  ? Fill one half of your plate at each meal with fruits and vegetables.  ? Fill one fourth of your plate at each meal with whole grains. Whole grains include whole-wheat pasta, brown rice, and whole-grain bread.  ? Eat or drink low-fat dairy products, such as skim milk or low-fat yogurt.  ? Fill one fourth of your plate at each meal with low-fat (lean) proteins. Low-fat proteins include fish, chicken without skin, eggs, beans, and tofu.  ? Avoid fatty meat, cured and processed meat, or chicken with skin.  ? Avoid pre-made or processed food.  · Eat less than 1,500 mg of salt each day.  · Do not drink alcohol if:  ? Your doctor tells you not to drink.  ? You are pregnant, may be pregnant, or are planning to become pregnant.  · If you drink alcohol:  ? Limit how much you use to:  § 0-1 drink a day for women.  § 0-2 drinks a day for men.  ? Be aware of how much alcohol is in your drink. In the [place] one drink equals one 12 oz bottle of beer (355 mL), one 5 oz glass of wine (148 mL), or one 1½ oz glass of hard liquor (44 mL).  Lifestyle    · Work with your doctor to stay at a healthy weight or to lose weight. Ask your doctor what the best weight is for you.  · Get at least 30 minutes of exercise most days of the week. This may include walking, swimming, or biking.  · Get at least 30 minutes of exercise that strengthens your muscles (resistance exercise) at least 3 days a week. This may include lifting weights or doing Pilates.  · Do not use any products that contain nicotine or tobacco, such as cigarettes, e-cigarettes, and chewing tobacco. If you need help quitting, ask your doctor.  · Check your blood pressure at home as told by your doctor.  · Keep all follow-up visits as told by your doctor. This is important.  Medicines  · Take over-the-counter  and prescription medicines only as told by your doctor. Follow directions carefully.  · Do not skip doses of blood pressure medicine. The medicine does not work as well if you skip doses. Skipping doses also puts you at risk for problems.  · Ask your doctor about side effects or reactions to medicines that you should watch for.  Contact a doctor if you:  · Think you are having a reaction to the medicine you are taking.  · Have headaches that keep coming back (recurring).  · Feel dizzy.  · Have swelling in your ankles.  · Have trouble with your vision.  Get help right away if you:  · Get a very bad headache.  · Start to feel mixed up (confused).  · Feel weak or numb.  · Feel faint.  · Have very bad pain in your:  ? Chest.  ? Belly (abdomen).  · Throw up more than once.  · Have trouble breathing.  Summary  · Hypertension is another name for high blood pressure.  · High blood pressure forces your heart to work harder to pump blood.  · For most people, a normal blood pressure is less than 120/80.  · Making healthy choices can help lower blood pressure. If your blood pressure does not get lower with healthy choices, you may need to take medicine.  This information is not intended to replace advice given to you by your health care provider. Make sure you discuss any questions you have with your health care provider.  Document Released: 06/05/2009 Document Revised: 08/28/2019 Document Reviewed: 08/28/2019  [employer] Interactive Patient Education © 2020 [employer]

## 2020-03-10 NOTE — PROGRESS NOTES
Subjective   Javier Torres is a 70 y.o. male. Presents today for med management blood pressure and GERD med refill.    History of Present Illness     Hypertension - Stable.  Taking the amlodipine 10mg, hydralazine 25mg TID.  Denies chest pain, shortness of breath, headache, lower extremity edema.  Stage 3 chronic kidney disease history.  His endocrinologist stopped the lisinopril because his potassium was increasing as well as his kidney numbers.  He is here today to make sure his blood pressure is doing okay and get further management instructions.    GERD-stable.  Patient is needing a refill of his Zantac.  He is taking it once a day currently but was wondering about taking it twice a day if that would be safe.  Denies any burning in his chest.        The following portions of the patient's history were reviewed and updated as appropriate: allergies, current medications, past family history, past medical history, past social history, past surgical history and problem list.    Review of Systems   Constitutional: Negative for activity change, appetite change, fatigue and fever.   HENT: Negative for ear pain, facial swelling and sore throat.    Eyes: Negative for discharge and itching.   Respiratory: Negative for cough, chest tightness and shortness of breath.    Cardiovascular: Negative for chest pain and palpitations.   Gastrointestinal: Negative for abdominal distention and constipation.   Endocrine: Negative for polydipsia, polyphagia and polyuria.   Genitourinary: Negative for difficulty urinating and flank pain.   Musculoskeletal: Negative for arthralgias and back pain.   Skin: Negative for color change, rash and wound.   Allergic/Immunologic: Negative for environmental allergies and food allergies.   Neurological: Negative for weakness and numbness.   Hematological: Negative for adenopathy. Does not bruise/bleed easily.   Psychiatric/Behavioral: Negative for decreased concentration and dysphoric mood. The  patient is not nervous/anxious.        Objective   Physical Exam   Constitutional: He is oriented to person, place, and time. He appears well-developed and well-nourished. No distress.   HENT:   Mouth/Throat: Oropharynx is clear and moist. No oropharyngeal exudate.   Eyes: Conjunctivae are normal. Right eye exhibits no discharge. Left eye exhibits no discharge.   Neck: Normal range of motion. Neck supple.   Cardiovascular: Normal rate, regular rhythm and normal heart sounds. Exam reveals no gallop and no friction rub.   No murmur heard.  Pulmonary/Chest: Effort normal and breath sounds normal. He has no wheezes. He has no rales.   Abdominal: Soft. Bowel sounds are normal. He exhibits no distension. There is no tenderness.   Musculoskeletal: He exhibits no edema or deformity.   Lymphadenopathy:     He has no cervical adenopathy.   Neurological: He is alert and oriented to person, place, and time.   Skin: Skin is warm and dry. No rash noted.   Psychiatric: He has a normal mood and affect. His behavior is normal.   Nursing note and vitals reviewed.  I have reviewed the HPI, review of systems, and physical exam and they are all accurate with the patient and for this encounter.          Assessment/Plan   Javier was seen today for med management.    Diagnoses and all orders for this visit:    Essential hypertension    Gastroesophageal reflux disease, esophagitis presence not specified  -     raNITIdine (ZANTAC) 150 MG tablet; Take 1 tablet by mouth 2 (Two) Times a Day As Needed for Heartburn.    I spent 25 minutes in the room with greater than 50% of the time counseling regarding blood pressure in the context of chronic kidney disease.  We discussed the pros and cons of either adding another medication or maxing out the hydralazine.  We went over why this is a good method for checking the blood pressure 3 times a day and possibly adding an extra dose if needed.  We also discussed in short amount the pros and cons of  using Zantac once versus twice a day.  His blood pressure is a little elevated for his goal.  I recommended that we increase the hydralazine.  However I do want increase it too much to make him tired and fatigued.  Therefore I am going to have her check his blood pressure 3 times a day for 2 weeks.  Each time we check it if his blood pressure is systolic 150 or diastolic 90 I am going to have them add an extra hydralazine dose to make it 50 mg.  At the end of the 2 weeks she will give me a call and let me know how he is doing and we will discuss what to do with his hydralazine after that.

## 2020-03-16 ENCOUNTER — TELEPHONE (OUTPATIENT)
Dept: ENDOCRINOLOGY | Age: 70
End: 2020-03-16

## 2020-03-16 DIAGNOSIS — E11.21 TYPE 2 DIABETES WITH NEPHROPATHY (HCC): ICD-10-CM

## 2020-03-16 NOTE — TELEPHONE ENCOUNTER
Called and kathryn Sepulveda told her to increase the lantus to 46 units and the novolog to 12 units with each meal amd to call in bs next week

## 2020-03-16 NOTE — TELEPHONE ENCOUNTER
PT'S POA CALLED AND STATED THAT SHE NEEDS SOME CLARIFICATION ON A NEW MEDICATION HE IS TAKING. HER NUMBER -[phone].

## 2020-03-18 ENCOUNTER — TELEPHONE (OUTPATIENT)
Dept: ENDOCRINOLOGY | Age: 70
End: 2020-03-18

## 2020-03-18 NOTE — TELEPHONE ENCOUNTER
Pt's POA called and stated that she had some questions for Daisy. She can be reached at [phone].

## 2020-03-21 DIAGNOSIS — M10.9 GOUT OF FOOT, UNSPECIFIED CAUSE, UNSPECIFIED CHRONICITY, UNSPECIFIED LATERALITY: ICD-10-CM

## 2020-03-23 RX ORDER — ATORVASTATIN CALCIUM 20 MG/1
TABLET, FILM COATED ORAL
Qty: 30 TABLET | Refills: 0 | Status: SHIPPED | OUTPATIENT
Start: 2020-03-23 | End: 2020-04-20 | Stop reason: SDUPTHER

## 2020-03-30 DIAGNOSIS — M10.9 GOUT OF FOOT, UNSPECIFIED CAUSE, UNSPECIFIED CHRONICITY, UNSPECIFIED LATERALITY: ICD-10-CM

## 2020-03-31 RX ORDER — RIVAROXABAN 20 MG/1
TABLET, FILM COATED ORAL
Qty: 90 TABLET | Refills: 0 | Status: SHIPPED | OUTPATIENT
Start: 2020-03-31 | End: 2020-06-26

## 2020-04-06 ENCOUNTER — OFFICE VISIT (OUTPATIENT)
Dept: FAMILY MEDICINE CLINIC | Facility: CLINIC | Age: 70
End: 2020-04-06

## 2020-04-06 DIAGNOSIS — M10.9 GOUT OF BIG TOE: Primary | ICD-10-CM

## 2020-04-06 PROCEDURE — G2025 DIS SITE TELE SVCS RHC/FQHC: HCPCS | Performed by: FAMILY MEDICINE

## 2020-04-06 RX ORDER — PREDNISONE 50 MG/1
50 TABLET ORAL DAILY
Qty: 5 TABLET | Refills: 0 | Status: SHIPPED | OUTPATIENT
Start: 2020-04-06 | End: 2020-06-26

## 2020-04-06 NOTE — PROGRESS NOTES
Subjective   Javier Torres is a 70 y.o. male. Telephone visit gout.    History of Present Illness     Gout- Complaining with his big toe being red and swollen.  Last episode was a few years ago.  The cousin, who is the caretaker, visited him and said his big toe was very red and swollen and painful and she was concerned it may be the gout was getting worse.  She increased the colchicine to 2 tablets x 1 which seemed to get him a little better.  However he still seems very uncomfortable today.  She is wondering there are some he could take.  Denies any severe increase in alcohol or red meat.  He has not had steroids in some time.  He is on anticoagulation.    The following portions of the patient's history were reviewed and updated as appropriate: allergies, current medications, past medical history and problem list.    Review of Systems   Constitutional: Negative for fatigue and fever.   Respiratory: Negative for shortness of breath and wheezing.    Cardiovascular: Negative for chest pain and palpitations.   Gastrointestinal: Negative for constipation and nausea.   Musculoskeletal:        Pain in big toe   Skin: Positive for color change.          Objective   Physical Exam   n/a    Assessment/Plan   Diagnoses and all orders for this visit:    Gout of big toe  -     predniSONE (DELTASONE) 50 MG tablet; Take 1 tablet by mouth Daily.       Continue the colchicine at the usual dose but will add prednisone 50 mg for the next 5 days.  They are to give me a call if his blood sugars or blood pressure get out of control during this time.  Follow-up PRN.    Spent about 12 minutes on the phone with the cousin regarding this patient.

## 2020-04-06 NOTE — PATIENT INSTRUCTIONS
Gout    Gout is painful swelling of your joints. Gout is a type of arthritis. It is caused by having too much uric acid in your body. Uric acid is a chemical that is made when your body breaks down substances called purines. If your body has too much uric acid, sharp crystals can form and build up in your joints. This causes pain and swelling.  Gout attacks can happen quickly and be very painful (acute gout). Over time, the attacks can affect more joints and happen more often (chronic gout).  What are the causes?  · Too much uric acid in your blood. This can happen because:  ? Your kidneys do not remove enough uric acid from your blood.  ? Your body makes too much uric acid.  ? You eat too many foods that are high in purines. These foods include organ meats, some seafood, and beer.  · Trauma or stress.  What increases the risk?  · Having a family history of gout.  · Being male and middle-aged.  · Being female and having gone through menopause.  · Being very overweight (obese).  · Drinking alcohol, especially beer.  · Not having enough water in the body (being dehydrated).  · Losing weight too quickly.  · Having an organ transplant.  · Having lead poisoning.  · Taking certain medicines.  · Having kidney disease.  · Having a skin condition called psoriasis.  What are the signs or symptoms?  An attack of acute gout usually happens in just one joint. The most common place is the big toe. Attacks often start at night. Other joints that may be affected include joints of the feet, ankle, knee, fingers, wrist, or elbow. Symptoms of an attack may include:  · Very bad pain.  · Warmth.  · Swelling.  · Stiffness.  · Shiny, red, or purple skin.  · Tenderness. The affected joint may be very painful to touch.  · Chills and fever.  Chronic gout may cause symptoms more often. More joints may be involved. You may also have white or yellow lumps (tophi) on your hands or feet or in other areas near your joints.  How is this  treated?  · Treatment for this condition has two phases: treating an acute attack and preventing future attacks.  · Acute gout treatment may include:  ? NSAIDs.  ? Steroids. These are taken by mouth or injected into a joint.  ? Colchicine. This medicine relieves pain and swelling. It can be given by mouth or through an IV tube.  · Preventive treatment may include:  ? Taking small doses of NSAIDs or colchicine daily.  ? Using a medicine that reduces uric acid levels in your blood.  ? Making changes to your diet. You may need to see a food expert (dietitian) about what to eat and drink to prevent gout.  Follow these instructions at home:  During a gout attack    · If told, put ice on the painful area:  ? Put ice in a plastic bag.  ? Place a towel between your skin and the bag.  ? Leave the ice on for 20 minutes, 2-3 times a day.  · Raise (elevate) the painful joint above the level of your heart as often as you can.  · Rest the joint as much as possible. If the joint is in your leg, you may be given crutches.  · Follow instructions from your doctor about what you cannot eat or drink.  Avoiding future gout attacks  · Eat a low-purine diet. Avoid foods and drinks such as:  ? Liver.  ? Kidney.  ? Anchovies.  ? Asparagus.  ? Herring.  ? Mushrooms.  ? Mussels.  ? Beer.  · Stay at a healthy weight. If you want to lose weight, talk with your doctor. Do not lose weight too fast.  · Start or continue an exercise plan as told by your doctor.  Eating and drinking  · Drink enough fluids to keep your pee (urine) pale yellow.  · If you drink alcohol:  ? Limit how much you use to:  § 0-1 drink a day for women.  § 0-2 drinks a day for men.  ? Be aware of how much alcohol is in your drink. In the [place] one drink equals one 12 oz bottle of beer (355 mL), one 5 oz glass of wine (148 mL), or one 1½ oz glass of hard liquor (44 mL).  General instructions  · Take over-the-counter and prescription medicines only as told by your doctor.  · Do  not drive or use heavy machinery while taking prescription pain medicine.  · Return to your normal activities as told by your doctor. Ask your doctor what activities are safe for you.  · Keep all follow-up visits as told by your doctor. This is important.  Contact a doctor if:  · You have another gout attack.  · You still have symptoms of a gout attack after 10 days of treatment.  · You have problems (side effects) because of your medicines.  · You have chills or a fever.  · You have burning pain when you pee (urinate).  · You have pain in your lower back or belly.  Get help right away if:  · You have very bad pain.  · Your pain cannot be controlled.  · You cannot pee.  Summary  · Gout is painful swelling of the joints.  · The most common site of pain is the big toe, but it can affect other joints.  · Medicines and avoiding some foods can help to prevent and treat gout attacks.  This information is not intended to replace advice given to you by your health care provider. Make sure you discuss any questions you have with your health care provider.  Document Released: 09/26/2009 Document Revised: 07/10/2019 Document Reviewed: 07/10/2019  [employer] Interactive Patient Education © 2020 [employer]

## 2020-04-20 DIAGNOSIS — M10.9 GOUT OF FOOT, UNSPECIFIED CAUSE, UNSPECIFIED CHRONICITY, UNSPECIFIED LATERALITY: ICD-10-CM

## 2020-04-20 RX ORDER — ATORVASTATIN CALCIUM 20 MG/1
20 TABLET, FILM COATED ORAL
Qty: 90 TABLET | Refills: 0 | Status: SHIPPED | OUTPATIENT
Start: 2020-04-20 | End: 2020-07-28

## 2020-05-13 DIAGNOSIS — M10.9 GOUT OF FOOT, UNSPECIFIED CAUSE, UNSPECIFIED CHRONICITY, UNSPECIFIED LATERALITY: ICD-10-CM

## 2020-05-14 RX ORDER — COLCHICINE 0.6 MG/1
TABLET, FILM COATED ORAL
Qty: 30 TABLET | Refills: 0 | Status: SHIPPED | OUTPATIENT
Start: 2020-05-14 | End: 2020-06-15

## 2020-05-29 RX ORDER — BLOOD SUGAR DIAGNOSTIC
STRIP MISCELLANEOUS
Qty: 300 EACH | Refills: 1 | Status: SHIPPED | OUTPATIENT
Start: 2020-05-29 | End: 2020-12-11

## 2020-06-01 DIAGNOSIS — I10 ESSENTIAL HYPERTENSION: ICD-10-CM

## 2020-06-02 RX ORDER — HYDRALAZINE HYDROCHLORIDE 25 MG/1
25 TABLET, FILM COATED ORAL 3 TIMES DAILY
Qty: 270 TABLET | Refills: 1 | Status: SHIPPED | OUTPATIENT
Start: 2020-06-02 | End: 2020-06-09 | Stop reason: SDUPTHER

## 2020-06-09 DIAGNOSIS — I10 ESSENTIAL HYPERTENSION: ICD-10-CM

## 2020-06-09 RX ORDER — HYDRALAZINE HYDROCHLORIDE 25 MG/1
25 TABLET, FILM COATED ORAL 3 TIMES DAILY
Qty: 270 TABLET | Refills: 1 | Status: SHIPPED | OUTPATIENT
Start: 2020-06-09 | End: 2020-06-09 | Stop reason: SDUPTHER

## 2020-06-09 RX ORDER — HYDRALAZINE HYDROCHLORIDE 25 MG/1
25 TABLET, FILM COATED ORAL 4 TIMES DAILY
Qty: 360 TABLET | Refills: 1 | Status: SHIPPED | OUTPATIENT
Start: 2020-06-09 | End: 2020-06-29

## 2020-06-10 DIAGNOSIS — E11.21 TYPE 2 DIABETES WITH NEPHROPATHY (HCC): ICD-10-CM

## 2020-06-14 DIAGNOSIS — E11.21 TYPE 2 DIABETES WITH NEPHROPATHY (HCC): ICD-10-CM

## 2020-06-14 DIAGNOSIS — M10.9 GOUT OF FOOT, UNSPECIFIED CAUSE, UNSPECIFIED CHRONICITY, UNSPECIFIED LATERALITY: ICD-10-CM

## 2020-06-15 RX ORDER — COLCHICINE 0.6 MG/1
TABLET, FILM COATED ORAL
Qty: 30 TABLET | Refills: 0 | Status: SHIPPED | OUTPATIENT
Start: 2020-06-15 | End: 2020-08-11

## 2020-06-19 NOTE — PROGRESS NOTES
Subjective   Javier Torres is a 70 y.o. male.     F/u for dm 2, hypertension, hyperlipidemia, CKD, vitamin d def / testing bs 3  X day / last dm eye exam 6/2020 with dr Srivastava  / last dm foot exam 2/22/20 with dr Meyers         Patient has known diabetes mellitus for many years. He was started on insulin in 2014. He has been on Levemir 50 units at bedtime, and Humalog 12 units with each meal.  He has been eating at home since the COVID epidemic.  Fasting glucose 193-301.  Lunchtime glucose 231-331.  Suppertime glucose 251-416.  Hemoglobin A1c done in February 2020 was 10.4%.  He denies any severe hypoglycemic episodes.  He has lost 14 pounds since October 2019.   He lives by himself and a cousin lives across the street. His last meal was 5 AM.      His last eye examination was in 6/20.   He has no history of retinopathy. He has chronic kidney disease and vitamin D deficiency and last saw Dr. Rubin in 1/20.  Urine microalbumin is normal on February 24, 2020.  He is on vitamin D 1000 units daily.  He denies numbness, tingling, or burning sensation in his feet.      He has hyperlipidemia and has been on Lipitor 20 mg once a day. He denies any myalgia. He has no history of thyroid disease.       He has hypertension and is on Norvasc 10 mg once a day, hydralazine 25 mg 4 times a day.he was taken off lisinopril because of hyperkalemia.  He has no previous history of heart attack or stroke.  He denies chest pain, shortness of breath or pedal edema.  Blood pressure at home runs between 116-180.     He is sleeping well with a CPAP.  He wakes up rested.    The following portions of the patient's history were reviewed and updated as appropriate: allergies, current medications, past family history, past medical history, past social history, past surgical history and problem list.    Review of Systems   Constitutional: Negative.  Negative for chills, fatigue and fever.   HENT: Negative.    Eyes: Negative.  Negative for visual  "disturbance.   Respiratory: Negative.  Negative for chest tightness, shortness of breath and wheezing.    Cardiovascular: Negative.  Negative for chest pain, palpitations and leg swelling.   Gastrointestinal: Negative.  Negative for abdominal distention, abdominal pain, anal bleeding and blood in stool.   Endocrine: Negative.  Negative for cold intolerance and heat intolerance.   Genitourinary: Negative.  Negative for difficulty urinating, frequency, hematuria and urgency.   Musculoskeletal: Negative for back pain and neck pain.   Skin: Negative for color change and rash.   Neurological: Negative.  Negative for dizziness, tremors, seizures, light-headedness, numbness and headaches.   Hematological: Bruises/bleeds easily (on xarelto).   Psychiatric/Behavioral: Negative.  Negative for agitation and confusion. The patient is not nervous/anxious.      Objective      Vitals:    06/29/20 1005   BP: 160/80   BP Location: Right arm   Patient Position: Sitting   Cuff Size: Large Adult   Pulse: 79   SpO2: 99%   Weight: 111 kg (245 lb 6.4 oz)   Height: 177.8 cm (70\")     Physical Exam   Constitutional: He is oriented to person, place, and time. He appears well-developed and well-nourished. No distress.   HENT:   Head: Normocephalic.   Right Ear: External ear normal.   Left Ear: External ear normal.   Nose: Nose normal.   Mouth/Throat: Oropharynx is clear and moist. No oropharyngeal exudate.   Eyes: Conjunctivae and EOM are normal. Right eye exhibits no discharge. Left eye exhibits no discharge. No scleral icterus.   Neck: Neck supple. No JVD present. No tracheal deviation present. No thyromegaly present.   Cardiovascular: Normal rate, regular rhythm, normal heart sounds and intact distal pulses. Exam reveals no gallop and no friction rub.   No murmur heard.  Pulmonary/Chest: Effort normal and breath sounds normal. No stridor. No respiratory distress. He has no wheezes. He has no rales. He exhibits no tenderness.   Abdominal: " Soft. Bowel sounds are normal. He exhibits no distension and no mass. There is no tenderness. There is no rebound and no guarding.   Musculoskeletal: Normal range of motion. He exhibits edema (++edema with chronic venous stasis  changes). He exhibits no tenderness or deformity.   Lymphadenopathy:     He has no cervical adenopathy.   Neurological: He is alert and oriented to person, place, and time. He displays normal reflexes.   Skin: Skin is warm and dry.   Psychiatric: He has a normal mood and affect.     Office Visit on 02/24/2020   Component Date Value Ref Range Status   • Glucose 02/24/2020 176* 65 - 99 mg/dL Final   • BUN 02/24/2020 50* 8 - 27 mg/dL Final   • Creatinine 02/24/2020 2.39* 0.76 - 1.27 mg/dL Final   • eGFR Non African Am 02/24/2020 26* >59 mL/min/1.73 Final   • eGFR African Am 02/24/2020 31* >59 mL/min/1.73 Final   • BUN/Creatinine Ratio 02/24/2020 21  10 - 24 Final   • Sodium 02/24/2020 140  134 - 144 mmol/L Final   • Potassium 02/24/2020 5.9* 3.5 - 5.2 mmol/L Final   • Chloride 02/24/2020 106  96 - 106 mmol/L Final   • Total CO2 02/24/2020 18* 20 - 29 mmol/L Final   • Calcium 02/24/2020 9.4  8.6 - 10.2 mg/dL Final   • Total Protein 02/24/2020 6.5  6.0 - 8.5 g/dL Final   • Albumin 02/24/2020 4.4  3.8 - 4.8 g/dL Final   • Globulin 02/24/2020 2.1  1.5 - 4.5 g/dL Final   • A/G Ratio 02/24/2020 2.1  1.2 - 2.2 Final   • Total Bilirubin 02/24/2020 <0.2  0.0 - 1.2 mg/dL Final   • Alkaline Phosphatase 02/24/2020 56  39 - 117 IU/L Final   • AST (SGOT) 02/24/2020 20  0 - 40 IU/L Final   • ALT (SGPT) 02/24/2020 22  0 - 44 IU/L Final   • Total Cholesterol 02/24/2020 163  100 - 199 mg/dL Final   • Triglycerides 02/24/2020 246* 0 - 149 mg/dL Final   • HDL Cholesterol 02/24/2020 34* >39 mg/dL Final   • VLDL Cholesterol 02/24/2020 49* 5 - 40 mg/dL Final   • LDL Cholesterol  02/24/2020 80  0 - 99 mg/dL Final   • Hemoglobin A1C 02/24/2020 10.4* 4.8 - 5.6 % Final    Comment:          Prediabetes: 5.7 - 6.4            Diabetes: >6.4           Glycemic control for adults with diabetes: <7.0     • Creatinine, Urine 02/24/2020 65.3  Not Estab. mg/dL Final   • Microalbumin, Urine 02/24/2020 8.3  Not Estab. ug/mL Final   • Microalbumin/Creatinine Ratio 02/24/2020 13  0 - 29 mg/g creat Final    Comment:                        Normal:                0 -  29                         Moderately increased: 30 - 300                         Severely increased:       >300                **Please note reference interval change**     • 25 Hydroxy, Vitamin D 02/24/2020 29.4* 30.0 - 100.0 ng/mL Final    Comment: Vitamin D deficiency has been defined by the [place] of  Medicine and an Endocrine Society practice guideline as a  level of serum 25-OH vitamin D less than 20 ng/mL (1,2).  The Endocrine Society went on to further define vitamin D  insufficiency as a level between 21 and 29 ng/mL (2).  1. IOM ([place] of [employer]). 2010. Dietary reference     intakes for calcium and D. [place]: The     National Academies Press.  2. Brant MF, Nikky WEST, Ella LARA, et al.     Evaluation, treatment, and prevention of vitamin D     deficiency: an Endocrine Society clinical practice     guideline. JCEM. 2011 Jul; 96(7):1911-30.     • TSH 02/24/2020 2.530  0.450 - 4.500 uIU/mL Final   • Interpretation 02/24/2020 Note   Final    Comment: -------------------------------  CHRONIC KIDNEY DISEASE:  EGFR, BLOOD PRESSURE, AND PROTEINURIA ASSESSMENT  Estimated GFR is falling significantly with time; annualized  rate of decline (all values) is -7 mL/min/year, p = 0.01.  Rapid progression of CKD (greater than 5  mL/min/1.73m  and mortality. Evaluate for reversible causes and consider  nephrology referral. Current eGFR is 26 mL/min/1.73mE2  corresponding to CKD stage 4. Multiply eGFR by 1.159 if  patient is . Consider referral to a  nephrologist and appropriate patient education concerning  renal replacement therapies. Suggest  hepatitis B vaccination  and confirmation of immunity with serologic testing.  Potassium is above goal and has risen, was 5.3 and now is  5.9 mmol/L. Albumin:Creatinine ratio is not elevated and has  decreased, was 160.7 and now is 12.7 mg/g creat. Glycemic  control (HB A1c: 10.4 %) is not within goal and additional  action is indicated.  EGFR, BLOOD PRESSURE, AND PROTEINURIA TREATMENT SUGG                           ESTIONS  -  The current eGFR is significantly below the expected value  and has decreased, was 30 and now is 26 mL/min/1.73mE2.  Renal artery stenosis, obstruction, volume depletion,  NSAIDs, ACEI or ARB, or other medications and contrast  agents are common causes of a fall in eGFR. Based upon  current eGFR, patient is at very high risk for adverse  outcomes such as CKD progression, CVD, and mortality.  Guidelines recommend a target blood pressure of 140/90 mmHg  or less in CKD patients to reduce cardiovascular risk and  CKD progression. A higher blood pressure target may be  appropriate in older individuals to avoid symptomatic  hypotension. Hyperkalemia and low CO2 may reflect Type 4  renal tubular acidosis. ACEI or ARB may increase serum  potassium. Consider decreasing ACEI or ARB in the absence of  other causes of hyperkalemia. Maintain low potassium diet  and avoid salt substitutes and potassium retaining  medications (e.g. NSAIDS, potassium sparing diuretics,  trimethoprim/sulfa)                           .  EGFR, BLOOD PRESSURE, AND PROTEINURIA FOLLOW-UP  -  Spot Urine Panel (Albumin preferred) within 12 months;  fasting Renal Panel within 1 week; Hemoglobin A1C within 3  months;  -  BONE and MINERAL ASSESSMENT  Calcium is within goal and has risen, was 9.0 and now is 9.4  mg/dL. Carbon Dioxide is below goal and has decreased, was  23 and now is 18 mmol/L. Vitamin D insufficiency is present  (was 34.3 and now is 29.4 ng/mL).  BONE and MINERAL TREATMENT SUGGESTIONS  -  Interpretations require  simultaneous measurements of serum  calcium and phosphorus. Begin vitamin D (ergocalciferol  50,000 IU/month orally for 6 months; alternatively,  cholecalciferol 1159-5725 IU/d for 6 months). If not on  alkali, begin sodium bicarbonate, one 650 mg pill 2-3 times  daily, otherwise increase dose.  BONE and MINERAL FOLLOW-UP  -  25-Hydroxy Vitamin D within 6 months; fasting Renal Panel  within 1 week; fasting PTH with Renal Panel is recommended  by KDOQI guidelines, at least yearly;  -  LIPIDS ASSESSM                           ENT  Blood was non-fasting; interpret these results with caution.  LDL-C is normal and has risen, was 70 and now is 80 mg/dL.  Triglyceride is high and has not changed significantly, was  246 and now is 246 mg/dL. Non-HDL Cholesterol is normal and  has risen, was 119 and now is 129 mg/dL. HDL-C is low and  has not changed significantly, was 35 and now is 34 mg/dL.  LIPIDS TREATMENT SUGGESTIONS  -  Therapeutic lifestyle changes are always valuable to  maintain optimal blood lipid status (diet, exercise, weight  management). If at least a 50% LDL reduction from baseline  has not been achieved, begin or increase statin. Consider  measurement of LDL particle number or Apo B to adjudicate  need for further LDL lowering therapy. If statin cannot be  tolerated or increased, alternatives include use of an  intestinal agent (ezetimibe or bile acid sequestrant),  niacin, and/or fish oil.  LIPIDS FOLLOW-UP  -  fasting Lipid Panel within 12 months;  -  ANEMIA ASSESSMENT  Most recent order does not                            include a CBC Panel or iron  studies.  ANEMIA FOLLOW-UP  -  CBC is recommended by KDOQI guidelines, at least yearly;  -------------------------------  DISCLAIMER  These assessments and treatment suggestions are provided as  a convenience in support of the physician-patient  relationship and are not intended to replace the physician's  clinical judgment. They are derived from national  guidelines  in addition to other evidence and expert opinion. The  clinician should consider this information within the  context of clinical opinion and the individual patient.  SEE GUIDANCE FOR CHRONIC KIDNEY DISEASE PROGRAM: Kidney  Disease Improving Global Outcomes (KDIGO) clinical practice  guidelines are at http://kdigo.org/home/guidelines/.  National Kidney Foundation Kidney Disease Outcomes Quality  Initiative (KDOQI (TM)), with its limitations and  disclaimers, are at www.kidney.org/professionals/KDOQI. This  program is intended for patients who have been diagnosed  with stages 3, 4, or pre-dialy                           sis 5 CKD. It is not intended  for children, pregnant patients, or transplant patients.     • Interpretation 02/24/2020 Note   Final    Supplemental report is available.   • PDF Image 02/24/2020 Not applicable   Final   • PDF Image 02/24/2020 Not applicable   Final     Assessment/Plan   Javier was seen today for diabetes, hyperlipidemia, hypertension, chronic kidney disease and vitamin d deficiency.    Diagnoses and all orders for this visit:    Type 2 diabetes with nephropathy (CMS/HCC)  -     Discontinue: insulin detemir (Levemir FlexTouch) 100 UNIT/ML injection; Inject 60 Units under the skin into the appropriate area as directed Every Night.  -     insulin detemir (Levemir FlexTouch) 100 UNIT/ML injection; Inject 60 Units under the skin into the appropriate area as directed Every Night.  -     Comprehensive Metabolic Panel  -     Hemoglobin A1c  -     TSH  -     T4, Free    Type 2 diabetes mellitus with diabetic neuropathy, with long-term current use of insulin (CMS/HCC)  -     Comprehensive Metabolic Panel  -     Hemoglobin A1c  -     TSH  -     T4, Free    Essential hypertension  -     hydrALAZINE (APRESOLINE) 25 MG tablet; Take 2 tablets by mouth 3 (Three) Times a Day.  -     Comprehensive Metabolic Panel    Hyperlipidemia, unspecified hyperlipidemia type  -     Comprehensive  Metabolic Panel  -     Lipid Panel  -     TSH  -     T4, Free    Obstructive sleep apnea syndrome  -     Comprehensive Metabolic Panel  -     TSH    Vitamin D deficiency    Chronic kidney disease, stage III (moderate) (CMS/HCC)  -     Comprehensive Metabolic Panel    Other orders  -     insulin aspart (NovoLOG FlexPen) 100 UNIT/ML solution pen-injector sc pen; Inject 20 Units under the skin into the appropriate area as directed 3 (Three) Times a Day With Meals.      Increase Levemir to 60 units every evening.  Increase NovoLog to 20 units with each meal.  Send in blood sugar records in 1 week.  Continue Lipitor 20 mg/day.  Increase hydralazine to 50 mg 3 times a day.  Continue Norvasc 10 mg/day.  BP checks at home.  Follow-up with Dr. Rubin for blood pressure control and evaluation and 3 of hyperkalemia.  Continue vitamin D 1000 units/day per Dr. Rubin  Continue CPAP.    Copy of my note sent to Dr. Humphrey, and Dr. Rubin    Follow-up in 4 months.

## 2020-06-26 ENCOUNTER — OFFICE VISIT (OUTPATIENT)
Dept: FAMILY MEDICINE CLINIC | Facility: CLINIC | Age: 70
End: 2020-06-26

## 2020-06-26 VITALS
OXYGEN SATURATION: 99 % | HEART RATE: 72 BPM | RESPIRATION RATE: 17 BRPM | DIASTOLIC BLOOD PRESSURE: 92 MMHG | HEIGHT: 70 IN | SYSTOLIC BLOOD PRESSURE: 168 MMHG | WEIGHT: 236 LBS | TEMPERATURE: 97.4 F | BODY MASS INDEX: 33.79 KG/M2

## 2020-06-26 DIAGNOSIS — M10.9 GOUT OF FOOT, UNSPECIFIED CAUSE, UNSPECIFIED CHRONICITY, UNSPECIFIED LATERALITY: ICD-10-CM

## 2020-06-26 DIAGNOSIS — M1A.9XX0 CHRONIC GOUT WITHOUT TOPHUS, UNSPECIFIED CAUSE, UNSPECIFIED SITE: Primary | ICD-10-CM

## 2020-06-26 PROCEDURE — 99213 OFFICE O/P EST LOW 20 MIN: CPT | Performed by: FAMILY MEDICINE

## 2020-06-26 RX ORDER — RIVAROXABAN 20 MG/1
TABLET, FILM COATED ORAL
Qty: 90 TABLET | Refills: 1 | Status: SHIPPED | OUTPATIENT
Start: 2020-06-26 | End: 2021-01-18

## 2020-06-26 NOTE — PROGRESS NOTES
Subjective   Javier Torres is a 70 y.o. male. Follow-up visit for gout/right wrist pain.    Wrist Pain    Pertinent negatives include no fever.        Gout-he is still taking the colchicine.  He did get better with the prednisone treatment that I gave him prior but he did have some spikes in his blood sugar.  He has not had his uric acid checked since I saw him on the telehealth.  He is having some right wrist pain which he is not used to and is not sure if it is gout related or just osteoarthritis.  No injury to the joint has happened.  They have not been treating it with anything other than Tylenol which is modestly helpful.  Excessive movement makes it worse.  Of note the kidney doctors and the diabetic doctors are doing a bunch of labs on him right now to check his kidney function and his A1c and such.    The following portions of the patient's history were reviewed and updated as appropriate: allergies, current medications, past medical history and problem list.    Review of Systems   Constitutional: Negative for fatigue and fever.   Respiratory: Negative for shortness of breath and wheezing.    Cardiovascular: Negative for chest pain and palpitations.   Gastrointestinal: Negative for constipation and nausea.   Musculoskeletal:        Right wrist pain   Skin: Negative for color change.          Objective   Physical Exam   Constitutional: He appears well-developed and well-nourished. No distress.   Cardiovascular: Normal rate, regular rhythm and normal heart sounds. Exam reveals no gallop and no friction rub.   No murmur heard.  Pulmonary/Chest: Effort normal and breath sounds normal. He has no wheezes. He has no rales.   Musculoskeletal:        Right wrist: He exhibits normal range of motion, no bony tenderness, no swelling, no effusion, no crepitus and no deformity.   Mildly warm to the touch and mildly tender in a few spots.  No swelling.   Skin: Skin is warm. Capillary refill takes less than 2 seconds. He is  not diaphoretic.   Nursing note and vitals reviewed.         Assessment/Plan   Javier was seen today for med management and wrist pain.    Diagnoses and all orders for this visit:    Chronic gout without tophus, unspecified cause, unspecified site  -     Uric acid       Continue the colchicine at the usual dose.  I have ordered a uric acid to get a better idea was going on with his gout.  I will wait for the kidney numbers and diabetic numbers to come in before I make any further changes to anything.  His blood pressure also notably is a little elevated today but the diabetic doctor and kidney doctor have been altering his hydralazine so I am also going to wait and see what they do after the numbers today.

## 2020-06-27 LAB — URATE SERPL-MCNC: 7 MG/DL (ref 3.4–7)

## 2020-06-29 ENCOUNTER — OFFICE VISIT (OUTPATIENT)
Dept: ENDOCRINOLOGY | Age: 70
End: 2020-06-29

## 2020-06-29 VITALS
HEART RATE: 79 BPM | SYSTOLIC BLOOD PRESSURE: 160 MMHG | BODY MASS INDEX: 35.13 KG/M2 | DIASTOLIC BLOOD PRESSURE: 80 MMHG | OXYGEN SATURATION: 99 % | WEIGHT: 245.4 LBS | HEIGHT: 70 IN

## 2020-06-29 DIAGNOSIS — N18.30 CHRONIC KIDNEY DISEASE, STAGE III (MODERATE) (HCC): ICD-10-CM

## 2020-06-29 DIAGNOSIS — I10 ESSENTIAL HYPERTENSION: ICD-10-CM

## 2020-06-29 DIAGNOSIS — E55.9 VITAMIN D DEFICIENCY: ICD-10-CM

## 2020-06-29 DIAGNOSIS — Z79.4 TYPE 2 DIABETES MELLITUS WITH DIABETIC NEUROPATHY, WITH LONG-TERM CURRENT USE OF INSULIN (HCC): ICD-10-CM

## 2020-06-29 DIAGNOSIS — G47.33 OBSTRUCTIVE SLEEP APNEA SYNDROME: ICD-10-CM

## 2020-06-29 DIAGNOSIS — E11.21 TYPE 2 DIABETES WITH NEPHROPATHY (HCC): Primary | ICD-10-CM

## 2020-06-29 DIAGNOSIS — E11.40 TYPE 2 DIABETES MELLITUS WITH DIABETIC NEUROPATHY, WITH LONG-TERM CURRENT USE OF INSULIN (HCC): ICD-10-CM

## 2020-06-29 DIAGNOSIS — E78.5 HYPERLIPIDEMIA, UNSPECIFIED HYPERLIPIDEMIA TYPE: ICD-10-CM

## 2020-06-29 LAB
ALBUMIN SERPL-MCNC: 4.5 G/DL (ref 3.5–5.2)
ALBUMIN/GLOB SERPL: 1.9 G/DL
ALP SERPL-CCNC: 63 U/L (ref 39–117)
ALT SERPL-CCNC: 28 U/L (ref 1–41)
AST SERPL-CCNC: 25 U/L (ref 1–40)
BILIRUB SERPL-MCNC: 0.2 MG/DL (ref 0.2–1.2)
BUN SERPL-MCNC: 29 MG/DL (ref 8–23)
BUN/CREAT SERPL: 15.6 (ref 7–25)
CALCIUM SERPL-MCNC: 9.5 MG/DL (ref 8.6–10.5)
CHLORIDE SERPL-SCNC: 101 MMOL/L (ref 98–107)
CHOLEST SERPL-MCNC: 163 MG/DL (ref 0–200)
CO2 SERPL-SCNC: 24.8 MMOL/L (ref 22–29)
CREAT SERPL-MCNC: 1.86 MG/DL (ref 0.76–1.27)
GLOBULIN SER CALC-MCNC: 2.4 GM/DL
GLUCOSE SERPL-MCNC: 204 MG/DL (ref 65–99)
HBA1C MFR BLD: 11.2 % (ref 4.8–5.6)
HDLC SERPL-MCNC: 44 MG/DL (ref 40–60)
INTERPRETATION: NORMAL
INTERPRETATION: NORMAL
LDLC SERPL CALC-MCNC: 77 MG/DL (ref 0–100)
Lab: NORMAL
Lab: NORMAL
POTASSIUM SERPL-SCNC: 4.6 MMOL/L (ref 3.5–5.2)
PROT SERPL-MCNC: 6.9 G/DL (ref 6–8.5)
SODIUM SERPL-SCNC: 137 MMOL/L (ref 136–145)
T4 FREE SERPL-MCNC: 1.15 NG/DL (ref 0.93–1.7)
TRIGL SERPL-MCNC: 210 MG/DL (ref 0–150)
TSH SERPL DL<=0.005 MIU/L-ACNC: 2.59 UIU/ML (ref 0.27–4.2)
VLDLC SERPL CALC-MCNC: 42 MG/DL

## 2020-06-29 PROCEDURE — 99214 OFFICE O/P EST MOD 30 MIN: CPT | Performed by: INTERNAL MEDICINE

## 2020-06-29 RX ORDER — HYDRALAZINE HYDROCHLORIDE 25 MG/1
50 TABLET, FILM COATED ORAL 3 TIMES DAILY
Qty: 360 TABLET | Refills: 1 | Status: SHIPPED | OUTPATIENT
Start: 2020-06-29 | End: 2020-07-20

## 2020-07-20 ENCOUNTER — HOSPITAL ENCOUNTER (OUTPATIENT)
Dept: CARDIOLOGY | Facility: HOSPITAL | Age: 70
Discharge: HOME | End: 2020-07-20
Admitting: PHYSICIAN ASSISTANT

## 2020-07-20 ENCOUNTER — OFFICE VISIT (OUTPATIENT)
Dept: FAMILY MEDICINE CLINIC | Facility: CLINIC | Age: 70
End: 2020-07-20

## 2020-07-20 VITALS
TEMPERATURE: 99.3 F | HEIGHT: 70 IN | HEART RATE: 114 BPM | SYSTOLIC BLOOD PRESSURE: 160 MMHG | DIASTOLIC BLOOD PRESSURE: 80 MMHG | BODY MASS INDEX: 34.65 KG/M2 | WEIGHT: 242 LBS | OXYGEN SATURATION: 96 %

## 2020-07-20 DIAGNOSIS — R00.0 TACHYCARDIA: ICD-10-CM

## 2020-07-20 DIAGNOSIS — M79.605 ACUTE PAIN OF LEFT LOWER EXTREMITY: ICD-10-CM

## 2020-07-20 DIAGNOSIS — R09.02 HYPOXIA: ICD-10-CM

## 2020-07-20 DIAGNOSIS — M1A.9XX0 CHRONIC GOUT WITHOUT TOPHUS, UNSPECIFIED CAUSE, UNSPECIFIED SITE: Primary | ICD-10-CM

## 2020-07-20 DIAGNOSIS — R60.0 EDEMA OF LEFT LOWER EXTREMITY: ICD-10-CM

## 2020-07-20 LAB
BH CV LOWER VASCULAR LEFT COMMON FEMORAL AUGMENT: NORMAL
BH CV LOWER VASCULAR LEFT COMMON FEMORAL COMPETENT: NORMAL
BH CV LOWER VASCULAR LEFT COMMON FEMORAL COMPRESS: NORMAL
BH CV LOWER VASCULAR LEFT COMMON FEMORAL PHASIC: NORMAL
BH CV LOWER VASCULAR LEFT COMMON FEMORAL SPONT: NORMAL
BH CV LOWER VASCULAR LEFT DISTAL FEMORAL COMPRESS: NORMAL
BH CV LOWER VASCULAR LEFT GASTRONEMIUS COMPRESS: NORMAL
BH CV LOWER VASCULAR LEFT GREATER SAPH AK COMPRESS: NORMAL
BH CV LOWER VASCULAR LEFT GREATER SAPH BK COMPRESS: NORMAL
BH CV LOWER VASCULAR LEFT MID FEMORAL AUGMENT: NORMAL
BH CV LOWER VASCULAR LEFT MID FEMORAL COMPETENT: NORMAL
BH CV LOWER VASCULAR LEFT MID FEMORAL COMPRESS: NORMAL
BH CV LOWER VASCULAR LEFT MID FEMORAL PHASIC: NORMAL
BH CV LOWER VASCULAR LEFT MID FEMORAL SPONT: NORMAL
BH CV LOWER VASCULAR LEFT PERONEAL COMPRESS: NORMAL
BH CV LOWER VASCULAR LEFT POPLITEAL AUGMENT: NORMAL
BH CV LOWER VASCULAR LEFT POPLITEAL COMPETENT: NORMAL
BH CV LOWER VASCULAR LEFT POPLITEAL COMPRESS: NORMAL
BH CV LOWER VASCULAR LEFT POPLITEAL PHASIC: NORMAL
BH CV LOWER VASCULAR LEFT POPLITEAL SPONT: NORMAL
BH CV LOWER VASCULAR LEFT POSTERIOR TIBIAL COMPRESS: NORMAL
BH CV LOWER VASCULAR LEFT PROFUNDA FEMORAL COMPRESS: NORMAL
BH CV LOWER VASCULAR LEFT PROXIMAL FEMORAL COMPRESS: NORMAL
BH CV LOWER VASCULAR LEFT SAPHENOFEMORAL JUNCTION COMPRESS: NORMAL
BH CV LOWER VASCULAR RIGHT COMMON FEMORAL AUGMENT: NORMAL
BH CV LOWER VASCULAR RIGHT COMMON FEMORAL COMPETENT: NORMAL
BH CV LOWER VASCULAR RIGHT COMMON FEMORAL COMPRESS: NORMAL
BH CV LOWER VASCULAR RIGHT COMMON FEMORAL PHASIC: NORMAL
BH CV LOWER VASCULAR RIGHT COMMON FEMORAL SPONT: NORMAL

## 2020-07-20 PROCEDURE — 93971 EXTREMITY STUDY: CPT

## 2020-07-20 PROCEDURE — 99213 OFFICE O/P EST LOW 20 MIN: CPT | Performed by: PHYSICIAN ASSISTANT

## 2020-07-20 RX ORDER — HYDRALAZINE HYDROCHLORIDE 50 MG/1
25 TABLET, FILM COATED ORAL 3 TIMES DAILY
COMMUNITY
End: 2020-12-17

## 2020-07-20 RX ORDER — CHLORTHALIDONE 25 MG/1
TABLET ORAL
COMMUNITY
Start: 2020-07-09 | End: 2020-07-20

## 2020-07-20 RX ORDER — HYDRALAZINE HYDROCHLORIDE 25 MG/1
50 TABLET, FILM COATED ORAL
COMMUNITY
Start: 2020-06-29 | End: 2020-07-20

## 2020-07-20 RX ORDER — CHLORTHALIDONE 25 MG/1
50 TABLET ORAL
COMMUNITY
Start: 2020-07-09 | End: 2020-07-30

## 2020-07-20 RX ORDER — DOXYCYCLINE HYCLATE 100 MG/1
100 CAPSULE ORAL 2 TIMES DAILY
Qty: 20 CAPSULE | Refills: 0 | Status: SHIPPED | OUTPATIENT
Start: 2020-07-20 | End: 2020-08-14

## 2020-07-20 RX ORDER — ALLOPURINOL 100 MG/1
200 TABLET ORAL
COMMUNITY
Start: 2020-07-09 | End: 2020-09-28 | Stop reason: HOSPADM

## 2020-07-20 RX ORDER — ALLOPURINOL 100 MG/1
TABLET ORAL
COMMUNITY
Start: 2020-07-09 | End: 2020-07-20

## 2020-07-20 NOTE — PROGRESS NOTES
Left leg venous doppler completed. Negative for deep and superficial vein thrombosis. Results called to Debo HENRIQUEZ.

## 2020-07-20 NOTE — PROGRESS NOTES
Subjective   Javier Torres is a 70 y.o. male who presents today with left LE pain and swelling for less than 1 week.     History of Present Illness     Nephrology- Dr Rubin- Taking Allopurinol 100 mg once daily (and was to stop Colcrys). Was started on Chlorthalisdone as well.     Started with ankle soreness about 5-6 days ago then needed his cane 7/16/2020 and needed his cane, which is abnormal. 7/17/2020 worsening pain in the ankle and foot. Significant swelling and redness of left toes, left foot. Also left ankle and LLE edema, worse than normal.     No fever, vomiting, weakness, dizziness, CP, SOA.    The following portions of the patient's history were reviewed and updated as appropriate: allergies, current medications, past family history, past medical history, past social history, past surgical history and problem list.    Review of Systems   Constitutional: Positive for fatigue. Negative for diaphoresis.   HENT: Negative.    Respiratory: Negative.    Cardiovascular: Negative.    Gastrointestinal: Negative.    Genitourinary: Negative.    Musculoskeletal: Negative.    Neurological: Negative.    Psychiatric/Behavioral: Negative.        Objective   Vitals:    07/20/20 1303   BP: 160/80   Pulse: 114   Temp: 99.3 °F (37.4 °C)   SpO2: 96%     Body mass index is 35.04 kg/m².    Physical Exam   Constitutional: He is oriented to person, place, and time. He appears well-developed and well-nourished. No distress.   HENT:   Head: Normocephalic and atraumatic.   Right Ear: External ear normal.   Left Ear: External ear normal.   Eyes: Right eye exhibits no discharge. Left eye exhibits no discharge.   Neck: No tracheal deviation present.   Pulmonary/Chest: Effort normal. No respiratory distress.   Abdominal: Normal appearance and bowel sounds are normal. No hernia. Hernia confirmed negative in the right inguinal area and confirmed negative in the left inguinal area.   Musculoskeletal: He exhibits edema (left > right LE).    Neurological: He is alert and oriented to person, place, and time.   Skin: Skin is dry.   Psychiatric: His speech is normal and behavior is normal. Cognition and memory are normal. He is attentive.       Assessment/Plan   Javier was seen today for left foot swelling and left ankle swelling.    Diagnoses and all orders for this visit:    Chronic gout without tophus, unspecified cause, unspecified site  -     Duplex Venous Lower Extremity - Left CAR  -     CBC & Differential  -     Comprehensive Metabolic Panel  -     Uric Acid    Edema of left lower extremity  -     Duplex Venous Lower Extremity - Left CAR  -     CBC & Differential  -     Comprehensive Metabolic Panel  -     Uric Acid  -     doxycycline (VIBRAMYCIN) 100 MG capsule; Take 1 capsule by mouth 2 (Two) Times a Day.    Acute pain of left lower extremity  -     Duplex Venous Lower Extremity - Left CAR  -     CBC & Differential  -     Comprehensive Metabolic Panel  -     Uric Acid  -     doxycycline (VIBRAMYCIN) 100 MG capsule; Take 1 capsule by mouth 2 (Two) Times a Day.    Assessment and Plan  70 y.o. male who presents today with left LE pain and swelling for < 1 week. Patient started with ankle soreness about 5-6 days ago then needed his cane 7/16/2020, which is abnormal. 7/17/2020, he had worsening pain in the left ankle and foot with significant swelling and redness of left toes and left foot. He also left ankle and LLE edema, worse than normal. He denies fever, vomiting, weakness, dizziness, CP, SOA. He follows with nephrology, Dr Rubin, and is taking Allopurinol 100 mg once daily (was to stop Colcrys), and was started on Chlorthalisdone as well. He has had some weakness. On exam, patient appeared to have some respiratory distress and had tachycardia, hypoxia relative to his baseline, significant left > right edema with right foot pain from toes to ankle. He is ambulating with a walker today, which they reports is unusual.  I discussed with them  that he should consider going to the emergency department with tachycardia, hypoxia, apparent shortness of breath with walking and talking, significant edema, erythema, and concern for more serious etiology of symptoms.  They do not want to go to the emergency department today.  I will refer for stat venous duplex to rule out DVT. I will also check basic labs regarding gout, cbc, and CMP. I will contact Dr Rubin to see about stopping thiazide diuretic and starting a different medication. I will also see about etiology of some symptoms and will consider increasing Allopurinol if lab. I will also cover with Doxycycline 100 mg twice daily for 20 days but he will have to be cautious of sun exposure.  Since they were not wanting to go to the emergency department, I recommend someone stay with him overnight and that he is not left alone.  They should get a pulse oximeter and check his pulse ox regularly.  If he does not have significant improvement or has worsening, new or changing symptoms or persistent tachycardia and hypoxia, he should go to the emergency department ASAP.

## 2020-07-21 LAB
ALBUMIN SERPL-MCNC: 4.3 G/DL (ref 3.7–4.7)
ALBUMIN/GLOB SERPL: 1.5 {RATIO} (ref 1.2–2.2)
ALP SERPL-CCNC: 71 IU/L (ref 39–117)
ALT SERPL-CCNC: 40 IU/L (ref 0–44)
AST SERPL-CCNC: 78 IU/L (ref 0–40)
BASOPHILS # BLD AUTO: 0.1 X10E3/UL (ref 0–0.2)
BASOPHILS NFR BLD AUTO: 1 %
BILIRUB SERPL-MCNC: 0.2 MG/DL (ref 0–1.2)
BUN SERPL-MCNC: 45 MG/DL (ref 8–27)
BUN/CREAT SERPL: 20 (ref 10–24)
CALCIUM SERPL-MCNC: 9.5 MG/DL (ref 8.6–10.2)
CHLORIDE SERPL-SCNC: 94 MMOL/L (ref 96–106)
CO2 SERPL-SCNC: 24 MMOL/L (ref 20–29)
CREAT SERPL-MCNC: 2.23 MG/DL (ref 0.76–1.27)
EOSINOPHIL # BLD AUTO: 0.4 X10E3/UL (ref 0–0.4)
EOSINOPHIL NFR BLD AUTO: 6 %
ERYTHROCYTE [DISTWIDTH] IN BLOOD BY AUTOMATED COUNT: 13.7 % (ref 11.6–15.4)
GLOBULIN SER CALC-MCNC: 2.8 G/DL (ref 1.5–4.5)
GLUCOSE SERPL-MCNC: 104 MG/DL (ref 65–99)
HCT VFR BLD AUTO: 37.1 % (ref 37.5–51)
HGB BLD-MCNC: 12.1 G/DL (ref 13–17.7)
IMM GRANULOCYTES # BLD AUTO: 0 X10E3/UL (ref 0–0.1)
IMM GRANULOCYTES NFR BLD AUTO: 0 %
LYMPHOCYTES # BLD AUTO: 0.8 X10E3/UL (ref 0.7–3.1)
LYMPHOCYTES NFR BLD AUTO: 13 %
MCH RBC QN AUTO: 27.2 PG (ref 26.6–33)
MCHC RBC AUTO-ENTMCNC: 32.6 G/DL (ref 31.5–35.7)
MCV RBC AUTO: 83 FL (ref 79–97)
MONOCYTES # BLD AUTO: 0.9 X10E3/UL (ref 0.1–0.9)
MONOCYTES NFR BLD AUTO: 13 %
NEUTROPHILS # BLD AUTO: 4.3 X10E3/UL (ref 1.4–7)
NEUTROPHILS NFR BLD AUTO: 67 %
PLATELET # BLD AUTO: 305 X10E3/UL (ref 150–450)
POTASSIUM SERPL-SCNC: 4 MMOL/L (ref 3.5–5.2)
PROT SERPL-MCNC: 7.1 G/DL (ref 6–8.5)
RBC # BLD AUTO: 4.45 X10E6/UL (ref 4.14–5.8)
SODIUM SERPL-SCNC: 135 MMOL/L (ref 134–144)
URATE SERPL-MCNC: 7.9 MG/DL (ref 3.7–8.6)
WBC # BLD AUTO: 6.4 X10E3/UL (ref 3.4–10.8)

## 2020-07-22 ENCOUNTER — TELEPHONE (OUTPATIENT)
Dept: INTERNAL MEDICINE | Facility: CLINIC | Age: 70
End: 2020-07-22

## 2020-07-22 NOTE — TELEPHONE ENCOUNTER
His endocrinologist should be prescribing his diabetes supplies and medications.  How is he feeling today?  How is his leg and foot and is he still is weak and having trouble walking?

## 2020-07-22 NOTE — TELEPHONE ENCOUNTER
NANDA CALLED IN AND SPOKE TO MARK AND SHE STATED PATIENT NEEDS A PRESCRIPTION OF LACETS  20 MG .  NANDA ALSO WANTS TO KNOW IF WE CAN PRESCSCRIBE THESE OR IF NANDA NEEDS TO CONTACT DOCTOR DAILY OFFICE  TO BE SENT TO [facility] - [place], [place] - [address] [place] [address] - [phone]  - [phone]   [phone]    PATIENT CALL BACK [phone].   NANDA GIVES OKAY TO LEAVE A MESSAGE

## 2020-07-22 NOTE — TELEPHONE ENCOUNTER
Spoke with Coco she said that Dr Bravo has not called in his lasix. I left a message with the answering service to call her back. She did say he is feeling a little better.

## 2020-07-23 RX ORDER — FUROSEMIDE 20 MG/1
20 TABLET ORAL DAILY
Qty: 30 TABLET | Refills: 0
Start: 2020-07-23 | End: 2020-09-28 | Stop reason: HOSPADM

## 2020-07-23 NOTE — TELEPHONE ENCOUNTER
I spoke with Coco, patients caregiver. He was in need of lasix 20 mg. She informed me that Dr. Bravo ended up sending it in for him last night. I have went ahead and added this medication to patients med list.

## 2020-07-24 ENCOUNTER — OFFICE VISIT (OUTPATIENT)
Dept: FAMILY MEDICINE CLINIC | Facility: CLINIC | Age: 70
End: 2020-07-24

## 2020-07-24 ENCOUNTER — HOSPITAL ENCOUNTER (EMERGENCY)
Facility: HOSPITAL | Age: 70
Discharge: HOME | End: 2020-07-24
Attending: EMERGENCY MEDICINE | Admitting: EMERGENCY MEDICINE

## 2020-07-24 ENCOUNTER — APPOINTMENT (OUTPATIENT)
Dept: GENERAL RADIOLOGY | Facility: HOSPITAL | Age: 70
End: 2020-07-24

## 2020-07-24 VITALS
RESPIRATION RATE: 16 BRPM | OXYGEN SATURATION: 96 % | HEIGHT: 70 IN | BODY MASS INDEX: 29.35 KG/M2 | HEART RATE: 80 BPM | TEMPERATURE: 98.3 F | DIASTOLIC BLOOD PRESSURE: 83 MMHG | SYSTOLIC BLOOD PRESSURE: 162 MMHG | WEIGHT: 205 LBS

## 2020-07-24 VITALS
OXYGEN SATURATION: 93 % | HEART RATE: 107 BPM | HEIGHT: 70 IN | TEMPERATURE: 97.5 F | SYSTOLIC BLOOD PRESSURE: 170 MMHG | DIASTOLIC BLOOD PRESSURE: 80 MMHG | WEIGHT: 241 LBS | BODY MASS INDEX: 34.5 KG/M2

## 2020-07-24 DIAGNOSIS — R60.0 EDEMA OF LEFT LOWER EXTREMITY: ICD-10-CM

## 2020-07-24 DIAGNOSIS — R60.9 DEPENDENT EDEMA: Primary | ICD-10-CM

## 2020-07-24 DIAGNOSIS — R09.02 HYPOXIA: ICD-10-CM

## 2020-07-24 DIAGNOSIS — M10.9 GOUTY ARTHRITIS OF LEFT FOOT: ICD-10-CM

## 2020-07-24 DIAGNOSIS — M1A.9XX0 CHRONIC GOUT WITHOUT TOPHUS, UNSPECIFIED CAUSE, UNSPECIFIED SITE: Primary | ICD-10-CM

## 2020-07-24 DIAGNOSIS — N18.9 CHRONIC KIDNEY DISEASE, UNSPECIFIED CKD STAGE: ICD-10-CM

## 2020-07-24 DIAGNOSIS — R00.0 TACHYCARDIA: ICD-10-CM

## 2020-07-24 DIAGNOSIS — M79.605 ACUTE PAIN OF LEFT LOWER EXTREMITY: ICD-10-CM

## 2020-07-24 LAB
ALBUMIN SERPL-MCNC: 4 G/DL (ref 3.5–5.2)
ALBUMIN/GLOB SERPL: 1.2 G/DL
ALP SERPL-CCNC: 66 U/L (ref 39–117)
ALT SERPL W P-5'-P-CCNC: 39 U/L (ref 1–41)
ANION GAP SERPL CALCULATED.3IONS-SCNC: 11.9 MMOL/L (ref 5–15)
AST SERPL-CCNC: 42 U/L (ref 1–40)
BASOPHILS # BLD AUTO: 0.07 10*3/MM3 (ref 0–0.2)
BASOPHILS NFR BLD AUTO: 0.9 % (ref 0–1.5)
BILIRUB SERPL-MCNC: 0.3 MG/DL (ref 0–1.2)
BUN SERPL-MCNC: 46 MG/DL (ref 8–23)
BUN/CREAT SERPL: 21.3 (ref 7–25)
CALCIUM SPEC-SCNC: 9.9 MG/DL (ref 8.6–10.5)
CHLORIDE SERPL-SCNC: 94 MMOL/L (ref 98–107)
CO2 SERPL-SCNC: 29.1 MMOL/L (ref 22–29)
CREAT SERPL-MCNC: 2.16 MG/DL (ref 0.76–1.27)
CRP SERPL-MCNC: 8.77 MG/DL (ref 0–0.5)
D-LACTATE SERPL-SCNC: 1.5 MMOL/L (ref 0.5–2)
DEPRECATED RDW RBC AUTO: 42.3 FL (ref 37–54)
EOSINOPHIL # BLD AUTO: 0.16 10*3/MM3 (ref 0–0.4)
EOSINOPHIL NFR BLD AUTO: 2 % (ref 0.3–6.2)
ERYTHROCYTE [DISTWIDTH] IN BLOOD BY AUTOMATED COUNT: 13.8 % (ref 12.3–15.4)
ERYTHROCYTE [SEDIMENTATION RATE] IN BLOOD: 72 MM/HR (ref 0–20)
GFR SERPL CREATININE-BSD FRML MDRD: 30 ML/MIN/1.73
GLOBULIN UR ELPH-MCNC: 3.4 GM/DL
GLUCOSE SERPL-MCNC: 171 MG/DL (ref 65–99)
HCT VFR BLD AUTO: 35.3 % (ref 37.5–51)
HGB BLD-MCNC: 11.8 G/DL (ref 13–17.7)
HOLD SPECIMEN: NORMAL
HOLD SPECIMEN: NORMAL
IMM GRANULOCYTES # BLD AUTO: 0.03 10*3/MM3 (ref 0–0.05)
IMM GRANULOCYTES NFR BLD AUTO: 0.4 % (ref 0–0.5)
LYMPHOCYTES # BLD AUTO: 0.68 10*3/MM3 (ref 0.7–3.1)
LYMPHOCYTES NFR BLD AUTO: 8.3 % (ref 19.6–45.3)
MCH RBC QN AUTO: 28.2 PG (ref 26.6–33)
MCHC RBC AUTO-ENTMCNC: 33.4 G/DL (ref 31.5–35.7)
MCV RBC AUTO: 84.4 FL (ref 79–97)
MONOCYTES # BLD AUTO: 0.87 10*3/MM3 (ref 0.1–0.9)
MONOCYTES NFR BLD AUTO: 10.7 % (ref 5–12)
NEUTROPHILS NFR BLD AUTO: 6.34 10*3/MM3 (ref 1.7–7)
NEUTROPHILS NFR BLD AUTO: 77.7 % (ref 42.7–76)
NRBC BLD AUTO-RTO: 0 /100 WBC (ref 0–0.2)
NT-PROBNP SERPL-MCNC: 75.2 PG/ML (ref 0–900)
PLATELET # BLD AUTO: 315 10*3/MM3 (ref 140–450)
PMV BLD AUTO: 8.8 FL (ref 6–12)
POTASSIUM SERPL-SCNC: 4.2 MMOL/L (ref 3.5–5.2)
PROCALCITONIN SERPL-MCNC: 0.24 NG/ML (ref 0–0.25)
PROT SERPL-MCNC: 7.4 G/DL (ref 6–8.5)
RBC # BLD AUTO: 4.18 10*6/MM3 (ref 4.14–5.8)
SODIUM SERPL-SCNC: 135 MMOL/L (ref 136–145)
URATE SERPL-MCNC: 7.7 MG/DL (ref 3.4–7)
WBC # BLD AUTO: 8.15 10*3/MM3 (ref 3.4–10.8)
WHOLE BLOOD HOLD SPECIMEN: NORMAL
WHOLE BLOOD HOLD SPECIMEN: NORMAL

## 2020-07-24 PROCEDURE — 84145 PROCALCITONIN (PCT): CPT | Performed by: NURSE PRACTITIONER

## 2020-07-24 PROCEDURE — 73610 X-RAY EXAM OF ANKLE: CPT

## 2020-07-24 PROCEDURE — 73630 X-RAY EXAM OF FOOT: CPT

## 2020-07-24 PROCEDURE — 84550 ASSAY OF BLOOD/URIC ACID: CPT | Performed by: NURSE PRACTITIONER

## 2020-07-24 PROCEDURE — 83880 ASSAY OF NATRIURETIC PEPTIDE: CPT | Performed by: NURSE PRACTITIONER

## 2020-07-24 PROCEDURE — 99214 OFFICE O/P EST MOD 30 MIN: CPT | Performed by: PHYSICIAN ASSISTANT

## 2020-07-24 PROCEDURE — 99283 EMERGENCY DEPT VISIT LOW MDM: CPT

## 2020-07-24 PROCEDURE — 80053 COMPREHEN METABOLIC PANEL: CPT | Performed by: NURSE PRACTITIONER

## 2020-07-24 PROCEDURE — 85025 COMPLETE CBC W/AUTO DIFF WBC: CPT | Performed by: NURSE PRACTITIONER

## 2020-07-24 PROCEDURE — 86140 C-REACTIVE PROTEIN: CPT | Performed by: NURSE PRACTITIONER

## 2020-07-24 PROCEDURE — 83605 ASSAY OF LACTIC ACID: CPT | Performed by: NURSE PRACTITIONER

## 2020-07-24 PROCEDURE — 71045 X-RAY EXAM CHEST 1 VIEW: CPT

## 2020-07-24 PROCEDURE — 85652 RBC SED RATE AUTOMATED: CPT | Performed by: NURSE PRACTITIONER

## 2020-07-24 RX ORDER — PREDNISONE 20 MG/1
40 TABLET ORAL DAILY
Qty: 8 TABLET | Refills: 0 | Status: SHIPPED | OUTPATIENT
Start: 2020-07-24 | End: 2020-07-30

## 2020-07-24 RX ORDER — SODIUM CHLORIDE 0.9 % (FLUSH) 0.9 %
10 SYRINGE (ML) INJECTION AS NEEDED
Status: DISCONTINUED | OUTPATIENT
Start: 2020-07-24 | End: 2020-07-24 | Stop reason: HOSPADM

## 2020-07-24 RX ORDER — FUROSEMIDE 20 MG/1
20 TABLET ORAL
COMMUNITY
Start: 2020-07-23 | End: 2020-07-24

## 2020-07-24 RX ADMIN — SODIUM CHLORIDE, PRESERVATIVE FREE 10 ML: 5 INJECTION INTRAVENOUS at 12:40

## 2020-07-24 NOTE — PROGRESS NOTES
Subjective   Javier Torres is a 70 y.o. male who presents today with left LE pain and swelling for > 1 week.     History of Present Illness     Patient normally follows with Dr. Humphrey and was only seen by me once 7/20/2020 for an acute complaint, as Dr. Heaton was not in the office.    Patient was seen 7/20/2020 with left LE pain and swelling for < 1 week. Patient started with ankle soreness then needed his cane 7/16/2020, which is abnormal. 7/17/2020, he had worsening pain in the left ankle and foot with significant swelling and redness of left toes and left foot. He also left ankle and LLE edema, worse than normal. He denied fever, vomiting, weakness, dizziness, CP, SOA. He follows with nephrology, Dr Rubin, and was recently started on allopurinol 100 mg once daily (was to stop Colcrys) and started on Chlorthalidone as well. He had some weakness. On exam, patient appeared to have some respiratory distress and had tachycardia, hypoxia relative to his baseline from reviewing the chart, significant left > right lower extremity edema and mild to moderate erythema with left foot pain from toes to ankle. He was ambulating with a walker, which they reported was unusual.  I discussed with them that he should consider going to the emergency department with tachycardia, hypoxia, apparent shortness of breath with walking and talking, significant edema, erythema, and concern for more serious etiology of symptoms.      They did not want to go to the emergency department, so I referred for stat venous duplex to rule out DVT-negative. I also checked labs with normal WBC, elevated uric acid at 7.9, and creatinine of 2.23, which appears to be up from his baseline. I contacted Dr Rubin regarding stopping thiazide diuretic and starting a different medication.  Dr. Tena started Lasix 20 mg twice daily and stopped chlorthalidone.  Patient has not diuresed significantly, reporting he is not urinating much despite the Lasix.  I also  asked about increasing Allopurinol.  Per Dr. Rubin, increase allopurinol to 200 mg once daily. I also covered for cellulitis with Doxycycline 100 mg twice daily for 10 days but he will have to be cautious of sun exposure.  Since they did not want to go to the emergency department, I recommend someone stay with him overnight and that he is not left alone.  They should get a pulse oximeter and check his pulse ox regularly.  If he does not have significant improvement or has worsening, new or changing symptoms or persistent tachycardia and hypoxia, he should go to the emergency department ASAP.     It does not sound like anyone stayed with him around the clock but did check on him multiple times per day.  They state his leg was slightly less red yesterday but he was in his house with the air conditioner off without elevating his foot.  He reports his foot hurts worse when he tries to elevate it.  He is still having significant pain and is unable to wear shoes and barely able to wear house shoes today.  He has not had any improvement in his edema and still has mild to moderate erythema.  He continues to ambulate with discomfort with a walker.  He has seen no significant improvement in symptoms.    The following portions of the patient's history were reviewed and updated as appropriate: allergies, current medications, past family history, past medical history, past social history, past surgical history and problem list.    Review of Systems   Constitutional: Positive for fatigue. Negative for diaphoresis.   HENT: Negative.    Respiratory: Negative.    Cardiovascular: Negative.    Gastrointestinal: Negative.    Genitourinary: Negative.    Musculoskeletal: Negative.    Neurological: Negative.    Psychiatric/Behavioral: Negative.        Objective   Vitals:    07/24/20 0840   BP: 170/80   Pulse: 107   Temp: 97.5 °F (36.4 °C)   SpO2: 93%     Body mass index is 34.89 kg/m².    Physical Exam   Constitutional: He is oriented to  person, place, and time. He appears well-developed and well-nourished. He appears distressed.   HENT:   Head: Normocephalic and atraumatic.   Right Ear: External ear normal.   Left Ear: External ear normal.   Eyes: Right eye exhibits no discharge. Left eye exhibits no discharge.   Neck: No tracheal deviation present.   Pulmonary/Chest: Effort normal.   Shortness of air with ambulation   Abdominal: Normal appearance. No hernia. Hernia confirmed negative in the right inguinal area and confirmed negative in the left inguinal area.   Musculoskeletal: He exhibits edema (left > right LE).   Neurological: He is alert and oriented to person, place, and time.   Skin: Skin is warm and dry. There is erythema.   Psychiatric: His speech is normal and behavior is normal. Cognition and memory are normal. He is attentive.       Assessment/Plan   Javier was seen today for f/u leg swelling.    Diagnoses and all orders for this visit:    Chronic gout without tophus, unspecified cause, unspecified site    Edema of left lower extremity    Acute pain of left lower extremity    Tachycardia    Hypoxia       Assessment and Plan  70 y.o. male left LE pain and swelling for > 1 week. Patient normally follows with Dr. Humphrey and was only seen by me once 7/20/2020 for an acute complaint, as Dr. Heaton was not in the office. He was seen 7/20/2020 with left LE pain and swelling for < 1 week. Patient started with ankle soreness then needed his cane 7/16/2020, which is abnormal. 7/17/2020, he had worsening pain in the left ankle and foot with significant swelling and redness of left toes and left foot. He also left ankle and LLE edema, worse than normal. He denied fever, vomiting, weakness, dizziness, CP, SOA. He follows with nephrology, Dr Rubin, and was recently started on allopurinol 100 mg once daily (was to stop Colcrys) and started on Chlorthalidone as well. He had some weakness. On exam, patient appeared to have some respiratory distress and  had tachycardia, hypoxia relative to his baseline from reviewing the chart, significant left > right lower extremity edema and mild to moderate erythema with left foot pain from toes to ankle. He was ambulating with a walker, which they reported was unusual.  I discussed with them that he should consider going to the emergency department with tachycardia, hypoxia, apparent shortness of breath with walking and talking, significant edema, erythema, and concern for more serious etiology of symptoms.      They did not want to go to the emergency department, so I referred for stat venous duplex to rule out DVT-negative. I also checked labs with normal WBC, elevated uric acid at 7.9, and creatinine of 2.23, which appears to be up from his baseline. I contacted Dr Rubin regarding stopping thiazide diuretic and starting a different medication.  Dr. Tena started Lasix 20 mg twice daily and stopped chlorthalidone.  Patient has not diuresed significantly, reporting he is not urinating much despite the Lasix.  I also asked about increasing Allopurinol.  Per Dr. Rubin, increase allopurinol to 200 mg once daily. I also covered for cellulitis with Doxycycline 100 mg twice daily for 10 days but he will have to be cautious of sun exposure.  Since they did not want to go to the emergency department, I recommend someone stay with him overnight and that he is not left alone.  They should get a pulse oximeter and check his pulse ox regularly.  If he does not have significant improvement or has worsening, new or changing symptoms or persistent tachycardia and hypoxia, he should go to the emergency department ASAP.     It does not sound like anyone stayed with him around the clock but did check on him multiple times per day.  They state his leg was slightly less red yesterday but he was in his house with the air conditioner off without elevating his foot.  He reports his foot hurts worse when he tries to elevate it.  He is still having  significant pain and is unable to wear shoes and barely able to wear house shoes today.  He has not had any improvement in his edema and still has mild to moderate erythema.  He continues to ambulate with discomfort with a walker.  He has seen no significant improvement in symptoms.  On exam, he has not improved with continued significant left greater than right edema, left foot erythema, significant tenderness to palpation, continued uncontrolled blood pressures that have been present for at least 1 month, persistent tachycardia and worsening hypoxia.  I discussed with them this would be considered a failure of outpatient diuresis and antibiotic treatment.  With his significantly abnormal creatinine 7/20/2020 and the addition of Lasix 20 mg twice daily, I am concerned his renal function may be worse.  He is to go to the emergency department ASAP today for evaluation and treatment.  They are agreeable and will go to [facility] level today.

## 2020-07-24 NOTE — ED NOTES
Pt to ED with family with c/o soreness, redness, edema to Lateral L ankle x 6 days, denies hx of CHF, + hx of gout. No drainage noted but family member states she did see scant amount of clear drainage a few days ago. Painful to touch, trouble ambulating d/t swelling but normally ambulates with walker. Pt has hx of MR per family, does live alone but she lives across the street, pt is cooperative, answers some questions appropriately but acting himself at baseline per family.     Patient was placed in face mask in first look. Patient was wearing facemask when I entered the room and throughout our encounter. I wore full protective equipment throughout this patient encounter including a face mask, eye shield, and gloves. Hand hygiene was performed before donning protective equipment and after removal when leaving the room.       Nataliya Alford, RN  07/24/20 5898

## 2020-07-24 NOTE — ED PROVIDER NOTES
Pt presents to the ED c/o  approximately 1 week of bilateral lower extremity edema with some mild redness to the left foot.  Has not had any fevers or new injury, does have some discomfort to the left foot as well.  History of gout.  Recently started on allopurinol.  No shortness of breath or chest pain.  Recent negative DVT ultrasound in the last 7 to 10 days.     On exam,   General: Awake, alert, no acute distress  HEENT: EOMI  Pulm: Symmetric chest rise, nonlabored breathing  CV: Regular rate and rhythm, bilateral lower extremity edema, slight erythema to the left great toe and ankle region without abnormal heat  GI: Non-distended  MSK: No deformity  Skin: Warm, dry  Neuro: Alert and oriented x 3, moving all extremities, no focal deficits  Psych: Calm, cooperative    Vital signs and nursing notes reviewed.       Surgical mask, protective eye goggles, and gloves used during this encounter. Patient in surgical mask.      Plan:   ED Course as of Jul 24 1445 Fri Jul 24, 2020   1300 I viewed chest x-ray on PACS system.  My interpretation is no pleural effusion.   C-Reactive Protein(!): 8.77 [JS]   1428 BUN(!): 46 [JS]   1428 Creatinine(!): 2.16 [JS]   1428 Hemoglobin(!): 11.8 [JS]      ED Course User Index  [JS] Nataliya Torres APRN     Per work-up and the results, felt this is likely a mild gout flare in the left foot, will add prednisone to his regimen due to having success with that in the past, advising avoiding NSAIDs due to his chronic kidney disease.  Do not have any emergent concerns for infectious cellulitis or joint infection at this time, plan for close follow-up with primary care provider and return to the emergency department for worsening symptoms as needed.     Attestation:  The EDWIN and I have discussed this patient's history, physical exam, and treatment plan.  I have reviewed the documentation and personally had a face to face interaction with the patient. I affirm the documentation and agree  with the treatment and plan.  The attached note describes my personal findings.          Dionisio Hennessy MD  07/24/20 5128

## 2020-07-24 NOTE — ED PROVIDER NOTES
" EMERGENCY DEPARTMENT ENCOUNTER    Room Number:  34/34  Date of encounter:  7/24/2020  PCP: Federico Humphrey MD  Historian: Patient  Full history not obtainable due to: None    HPI:  Chief Complaint: Bilateral lower extremity edema    Context: Javier Torres is a 70 y.o. male with a hx of gout who presents to the ED c/o bilateral lower extremity edema 6 days prior. Symptoms have been constant. Worse with walking. Dr Rubin changed his diuretic (he d/c'ed his chlorothiazide and started him on lasix) to help with swelling but it has not improved his swelling. Associated left foot pain and erythema. no fever. Recently changed his gout medication that he had taken for several years and placed him on Allopurinol last week. Saw his pcp's PA twice this week who ordered out patient US which was negative for DVT. She arranged Dr Rubin to change his diuretic and increase his allopurinol which has not helped his symptoms. Also placed him on Doxycycline which again, has not improved his symptoms.     No fall or injury. No difficulty breathing or soa. No chest pain    Chronically anticoagulated on xarelto for hx of blood clot.     MEDICAL RECORD REVIEW: Reviewed Debo Skinner's office visit note dated today's date.  The patient had been seen in her office earlier in the week for lower extremity swelling and worsening renal function.  Dr. Tena is the patient's nephrologist who was consulted and the patient was taken off \"thiazide and added Lasix in its place.  He had reported dyspnea on exertion and had notably bilateral lower extremity edema.  He did not want to go to the emergency room earlier in the week and she ordered Doppler studies which were negative for DVT.  The patient was placed on doxycycline presumably for cellulitis although it is not clear given his presentation of bilateral lower extremity edema.  He was recommended to to the emergency department today given no improvement in symptoms after outpatient " treatment.      PAST MEDICAL HISTORY    Active Ambulatory Problems     Diagnosis Date Noted   • Type 2 diabetes with nephropathy (CMS/[facility])    • Essential hypertension    • Hyperlipidemia    • Vitamin D deficiency    • Benign prostatic hyperplasia with urinary obstruction 06/28/2016   • Benign neoplasm of skin of buttock 06/28/2016   • Chronic kidney disease, stage III (moderate) (CMS/[facility]) 06/28/2016   • Dyslipidemia 06/28/2016   • Edema 06/28/2016   • Gastroesophageal reflux disease 06/28/2016   • Gout 06/28/2016   • Hearing loss 06/28/2016   • Mild intellectual disability 06/28/2016   • Adiposity 06/28/2016   • Obstructive sleep apnea syndrome 06/28/2016   • Thoracic back pain 06/28/2016   • Tricuspid valve insufficiency 06/28/2016   • Cholelithiasis 09/26/2016   • History of DVT (deep vein thrombosis) 12/21/2016   • Medicare annual wellness visit, subsequent 03/31/2017   • Type 2 diabetes mellitus with diabetic neuropathy, with long-term current use of insulin (CMS/[facility]) 05/25/2017   • Neurofibroma of trunk 06/22/2017     Resolved Ambulatory Problems     Diagnosis Date Noted   • Swollen leg 06/14/2016   • Swelling of limb 06/14/2016   • Edema of right lower extremity 06/14/2016   • Painful skin lesion 06/28/2016   • Cataract of left eye 07/25/2016   • Acute pain of left knee 05/05/2017     Past Medical History:   Diagnosis Date   • CKD (chronic kidney disease)    • Diabetic nephropathy (CMS/[facility])    • DVT (deep venous thrombosis) (CMS/[facility])    • GERD (gastroesophageal reflux disease)    • Hypertension    • Obstructive sleep apnea on CPAP    • Type 2 diabetes mellitus (CMS/[facility])          PAST SURGICAL HISTORY  Past Surgical History:   Procedure Laterality Date   • CATARACT EXTRACTION Bilateral    • COLONOSCOPY  2014    Normal   • HERNIA REPAIR     • WRIST SURGERY Left          FAMILY HISTORY  Family History   Problem Relation Age of Onset   • Cancer Mother         BLADDER   • Heart disease Mother    • Arthritis Mother     • Diabetes Father    • Hypertension Father    • Cancer Father         Skin   • Hyperlipidemia Father          SOCIAL HISTORY  Social History     Socioeconomic History   • Marital status: Single     Spouse name: Not on file   • Number of children: Not on file   • Years of education: Not on file   • Highest education level: Not on file   Tobacco Use   • Smoking status: Never Smoker   • Smokeless tobacco: Never Used   Substance and Sexual Activity   • Alcohol use: No   • Drug use: No         ALLERGIES  Patient has no known allergies.        REVIEW OF SYSTEMS  Review of Systems   All systems reviewed and marked as negative except as listed in HPI       PHYSICAL EXAM    I have reviewed the triage vital signs and nursing notes.    ED Triage Vitals [07/24/20 1150]   Temp Heart Rate Resp BP SpO2   98.3 °F (36.8 °C) 97 14 -- 97 %      Temp src Heart Rate Source Patient Position BP Location FiO2 (%)   Tympanic Monitor -- -- --       GENERAL: alert well developed, well nourished in no distress  HENT: NCAT, neck supple, trachea midline  EYES: no scleral icterus, PERRL, normal conjunctiva  CV: regular rhythm, regular rate, no murmur  RESPIRATORY: unlabored effort, CTAB  ABDOMEN: soft, non-tender, non-distended, bowel sounds present  MUSCULOSKELETAL: no gross deformity. BLE edema, 3+ pitting, slightly greater on left vs right. Pedal pulses are palpable. There is poorly circumscribed erythema of the lateral malleolus and great toe with tenderness over these areas.   NEURO: alert,  sensory and motor function of extremities grossly intact, speech clear, mental status normal/baseline  SKIN: warm, dry, no rash  PSYCH:  Appropriate mood and affect    Vital signs and nursing notes reviewed.          LAB RESULTS  Recent Results (from the past 24 hour(s))   Comprehensive Metabolic Panel    Collection Time: 07/24/20 12:41 PM   Result Value Ref Range    Glucose 171 (H) 65 - 99 mg/dL    BUN 46 (H) 8 - 23 mg/dL    Creatinine 2.16 (H) 0.76 -  1.27 mg/dL    Sodium 135 (L) 136 - 145 mmol/L    Potassium 4.2 3.5 - 5.2 mmol/L    Chloride 94 (L) 98 - 107 mmol/L    CO2 29.1 (H) 22.0 - 29.0 mmol/L    Calcium 9.9 8.6 - 10.5 mg/dL    Total Protein 7.4 6.0 - 8.5 g/dL    Albumin 4.00 3.50 - 5.20 g/dL    ALT (SGPT) 39 1 - 41 U/L    AST (SGOT) 42 (H) 1 - 40 U/L    Alkaline Phosphatase 66 39 - 117 U/L    Total Bilirubin 0.3 0.0 - 1.2 mg/dL    eGFR Non African Amer 30 (L) >60 mL/min/1.73    Globulin 3.4 gm/dL    A/G Ratio 1.2 g/dL    BUN/Creatinine Ratio 21.3 7.0 - 25.0    Anion Gap 11.9 5.0 - 15.0 mmol/L   BNP    Collection Time: 07/24/20 12:41 PM   Result Value Ref Range    proBNP 75.2 0.0 - 900.0 pg/mL   CBC Auto Differential    Collection Time: 07/24/20 12:41 PM   Result Value Ref Range    WBC 8.15 3.40 - 10.80 10*3/mm3    RBC 4.18 4.14 - 5.80 10*6/mm3    Hemoglobin 11.8 (L) 13.0 - 17.7 g/dL    Hematocrit 35.3 (L) 37.5 - 51.0 %    MCV 84.4 79.0 - 97.0 fL    MCH 28.2 26.6 - 33.0 pg    MCHC 33.4 31.5 - 35.7 g/dL    RDW 13.8 12.3 - 15.4 %    RDW-SD 42.3 37.0 - 54.0 fl    MPV 8.8 6.0 - 12.0 fL    Platelets 315 140 - 450 10*3/mm3    Neutrophil % 77.7 (H) 42.7 - 76.0 %    Lymphocyte % 8.3 (L) 19.6 - 45.3 %    Monocyte % 10.7 5.0 - 12.0 %    Eosinophil % 2.0 0.3 - 6.2 %    Basophil % 0.9 0.0 - 1.5 %    Immature Grans % 0.4 0.0 - 0.5 %    Neutrophils, Absolute 6.34 1.70 - 7.00 10*3/mm3    Lymphocytes, Absolute 0.68 (L) 0.70 - 3.10 10*3/mm3    Monocytes, Absolute 0.87 0.10 - 0.90 10*3/mm3    Eosinophils, Absolute 0.16 0.00 - 0.40 10*3/mm3    Basophils, Absolute 0.07 0.00 - 0.20 10*3/mm3    Immature Grans, Absolute 0.03 0.00 - 0.05 10*3/mm3    nRBC 0.0 0.0 - 0.2 /100 WBC   Procalcitonin    Collection Time: 07/24/20 12:41 PM   Result Value Ref Range    Procalcitonin 0.24 0.00 - 0.25 ng/mL   Lactic Acid, Plasma    Collection Time: 07/24/20 12:41 PM   Result Value Ref Range    Lactate 1.5 0.5 - 2.0 mmol/L   Sedimentation Rate    Collection Time: 07/24/20 12:41 PM   Result Value  Ref Range    Sed Rate 72 (H) 0 - 20 mm/hr   C-reactive Protein    Collection Time: 07/24/20 12:41 PM   Result Value Ref Range    C-Reactive Protein 8.77 (H) 0.00 - 0.50 mg/dL   Uric Acid    Collection Time: 07/24/20 12:41 PM   Result Value Ref Range    Uric Acid 7.7 (H) 3.4 - 7.0 mg/dL   Light Blue Top    Collection Time: 07/24/20 12:41 PM   Result Value Ref Range    Extra Tube hold for add-on    Green Top (Gel)    Collection Time: 07/24/20 12:41 PM   Result Value Ref Range    Extra Tube Hold for add-ons.    Lavender Top    Collection Time: 07/24/20 12:41 PM   Result Value Ref Range    Extra Tube hold for add-on    Gold Top - SST    Collection Time: 07/24/20 12:41 PM   Result Value Ref Range    Extra Tube Hold for add-ons.        Ordered the above labs and independently reviewed the results.        RADIOLOGY  Xr Ankle 3+ View Left    Result Date: 7/24/2020  FRONTAL PROJECTION OF THE CHEST, 3 RADIOGRAPHIC VIEWS OF THE LEFT FOOT, AND 3 RADIOGRAPHIC VIEWS OF THE LEFT ANKLE  CLINICAL HISTORY: Swelling and erythema.  FINDINGS:  Three radiographic views of the left ankle demonstrate diffuse soft tissue swelling. Otherwise, there is no acute osseous abnormality that is appreciated. Incidental note is made of calcaneal spurring.  Three radiographic views of the left foot demonstrate mild calcaneal spurring and arthritic changes most prominently identified at the level of 1st metatarsophalangeal joint as well as the 1st interphalangeal joint. Again, diffuse soft tissue swelling is seen within the left foot. However, no acute osseous abnormality is identified.  Frontal projection of the chest demonstrates no acute infiltrates. The cardiomediastinal silhouette is within normal limits. Osseous structures are unremarkable. Incidental note is made of well-circumscribed ossific densities within the right axilla representing chronically calcified lymph nodes. Similar findings were noted on a prior chest CT dated 08/12/2015.  These  findings were discussed with Nataliya Torres on 07/24/2020 at approximately 1:30 PM.  This report was finalized on 7/24/2020 2:41 PM by Dr. Mumtaz Jaquez M.D.      Xr Foot 3+ View Left    Result Date: 7/24/2020  FRONTAL PROJECTION OF THE CHEST, 3 RADIOGRAPHIC VIEWS OF THE LEFT FOOT, AND 3 RADIOGRAPHIC VIEWS OF THE LEFT ANKLE  CLINICAL HISTORY: Swelling and erythema.  FINDINGS:  Three radiographic views of the left ankle demonstrate diffuse soft tissue swelling. Otherwise, there is no acute osseous abnormality that is appreciated. Incidental note is made of calcaneal spurring.  Three radiographic views of the left foot demonstrate mild calcaneal spurring and arthritic changes most prominently identified at the level of 1st metatarsophalangeal joint as well as the 1st interphalangeal joint. Again, diffuse soft tissue swelling is seen within the left foot. However, no acute osseous abnormality is identified.  Frontal projection of the chest demonstrates no acute infiltrates. The cardiomediastinal silhouette is within normal limits. Osseous structures are unremarkable. Incidental note is made of well-circumscribed ossific densities within the right axilla representing chronically calcified lymph nodes. Similar findings were noted on a prior chest CT dated 08/12/2015.  These findings were discussed with Nataliya Torres on 07/24/2020 at approximately 1:30 PM.  This report was finalized on 7/24/2020 2:41 PM by Dr. Mumtaz Jaquez M.D.      Xr Chest 1 View    Result Date: 7/24/2020  FRONTAL PROJECTION OF THE CHEST, 3 RADIOGRAPHIC VIEWS OF THE LEFT FOOT, AND 3 RADIOGRAPHIC VIEWS OF THE LEFT ANKLE  CLINICAL HISTORY: Swelling and erythema.  FINDINGS:  Three radiographic views of the left ankle demonstrate diffuse soft tissue swelling. Otherwise, there is no acute osseous abnormality that is appreciated. Incidental note is made of calcaneal spurring.  Three radiographic views of the left foot demonstrate mild calcaneal spurring and  arthritic changes most prominently identified at the level of 1st metatarsophalangeal joint as well as the 1st interphalangeal joint. Again, diffuse soft tissue swelling is seen within the left foot. However, no acute osseous abnormality is identified.  Frontal projection of the chest demonstrates no acute infiltrates. The cardiomediastinal silhouette is within normal limits. Osseous structures are unremarkable. Incidental note is made of well-circumscribed ossific densities within the right axilla representing chronically calcified lymph nodes. Similar findings were noted on a prior chest CT dated 08/12/2015.  These findings were discussed with Nataliya Torres on 07/24/2020 at approximately 1:30 PM.  This report was finalized on 7/24/2020 2:41 PM by Dr. Mumtaz Jaquez M.D.        I ordered the above noted radiological studies. Independently reviewed by me and discussed with radiologist.  See dictation above for official radiology interpretation.      PROCEDURES    Procedures        MEDICATIONS GIVEN IN ER    Medications - No data to display      PROGRESS, DATA ANALYSIS, CONSULTS, AND MEDICAL DECISION MAKING    All labs have been independently reviewed by me.  All radiology studies have been reviewed by me.   EKG's independently reviewed by me.  Discussion below represents my analysis of pertinent findings related to patient's condition, differential diagnosis, treatment plan and final disposition.    DIFFERENTIAL DIAGNOSIS INCLUDE BUT NOT LIMITED TO: PVD , CHF exacerbation, lymphedema, cellulitis, venous stasis, acute renal failure, gout, erysipelas, sepsis, volume overload, anasarca     ED Course as of Jul 24 1948   Fri Jul 24, 2020   1300 I viewed chest x-ray on PACS system.  My interpretation is no pleural effusion.   C-Reactive Protein(!): 8.77 [JS]   1428 BUN(!): 46 [JS]   1428 Creatinine(!): 2.16 [JS]   1428 Hemoglobin(!): 11.8 [JS]      ED Course User Index  [JS] Nataliya Torres APRN     MDM: The  patient presents with 1 week onset of bilateral lower extremity edema and left foot pain.  His work-up in the emergency department is unremarkable for signs of sepsis.  His white blood cell count is normal.  C-reactive protein and uric acid are up and given that the patient is tender over his left great toe and lateral malleolus this is more suggestive of gout even given the polyarticular nature.  There is no lymphangitis.  Pedal pulses are palpable.  He had bilateral Doppler ultrasound of the lower extremities earlier in the week which is negative for DVT.  There is no indication to repeat ultrasound in the emergency department as he had a negative scan earlier in the week and is chronically anticoagulated on Xarelto in which he reports compliance.  His kidney function is actually improved since discontinuing chlorothiazide and starting Lasix.  He does not appear volume overloaded and there is no pulmonary edema on chest x-ray.  His oxygen saturation has been mid to high 90s during his stay in the emergency department in contrast to concerns by PCP who said he was hypoxic.  Additionally the patient has no complaints of shortness of breath or difficulty breathing.  The patient states his gout medication that he was taking for refill axis was changed about a week and a half ago and given his presentation which is not clearly cellulitic feel his symptoms are most consistent with gouty arthritis in chronic dependent edema which is likely result of venous stasis.  He has taken prednisone in the past with good results for exacerbations of his gout and I will prescribe that today.  Given that there is some warmth and erythema to the left foot he can continue his doxycycline as previously prescribed by his family doctor.  He will need to follow-up closely early next week for reevaluation.  I discussed with him and his cousin at the bedside plan for discharge and they are both agreeable with this.  We discussed return  precautions including fever, worsening symptoms, worsening pain, red streaking up the leg, nausea vomiting or any new concerns.  They are agreeable this plan.      AS OF 19:48 VITALS:    BP - 162/83  HR - 80  TEMP - 98.3 °F (36.8 °C) (Tympanic)  02 SATS - 96%        DIAGNOSIS  Final diagnoses:   Dependent edema   Chronic kidney disease, unspecified CKD stage   Gouty arthritis of left foot         DISPOSITION  Discharge     Pt masked in first look. I wore a surgical mask throughout my encounters with the pt. I performed hand hygiene on entry into the pt room and upon exit.        Nataliya Torres, APRN  07/24/20 1953

## 2020-07-24 NOTE — ED NOTES
Pt to this ED c/o bilateral leg swelling and redness since Sunday, 07/19, more pronounced in LLE.  Pt POA denies hx CHF, COPD, denies recent weight gain, SOA, CP, weakness, dizziness, fever; reports that pt had ultrasound this week, negative DVT.  Pt has hx diabetes.     Valentin Coronado, RN  07/24/20 9915

## 2020-07-28 DIAGNOSIS — M10.9 GOUT OF FOOT, UNSPECIFIED CAUSE, UNSPECIFIED CHRONICITY, UNSPECIFIED LATERALITY: ICD-10-CM

## 2020-07-28 RX ORDER — ATORVASTATIN CALCIUM 20 MG/1
20 TABLET, FILM COATED ORAL
Qty: 90 TABLET | Refills: 1 | Status: SHIPPED | OUTPATIENT
Start: 2020-07-28 | End: 2021-02-23

## 2020-07-30 ENCOUNTER — OFFICE VISIT (OUTPATIENT)
Dept: FAMILY MEDICINE CLINIC | Facility: CLINIC | Age: 70
End: 2020-07-30

## 2020-07-30 ENCOUNTER — TELEPHONE (OUTPATIENT)
Dept: INTERNAL MEDICINE | Facility: CLINIC | Age: 70
End: 2020-07-30

## 2020-07-30 VITALS
OXYGEN SATURATION: 96 % | BODY MASS INDEX: 34.01 KG/M2 | HEIGHT: 70 IN | SYSTOLIC BLOOD PRESSURE: 160 MMHG | HEART RATE: 116 BPM | DIASTOLIC BLOOD PRESSURE: 70 MMHG | RESPIRATION RATE: 17 BRPM | WEIGHT: 237.6 LBS | TEMPERATURE: 98.9 F

## 2020-07-30 DIAGNOSIS — M10.072 ACUTE IDIOPATHIC GOUT INVOLVING TOE OF LEFT FOOT: Primary | ICD-10-CM

## 2020-07-30 PROCEDURE — 99213 OFFICE O/P EST LOW 20 MIN: CPT | Performed by: FAMILY MEDICINE

## 2020-07-30 RX ORDER — PREDNISONE 20 MG/1
TABLET ORAL
Qty: 11 TABLET | Refills: 0 | Status: SHIPPED | OUTPATIENT
Start: 2020-07-30 | End: 2020-08-08

## 2020-07-30 NOTE — TELEPHONE ENCOUNTER
PATIENT WAS IN THE HOSPITAL ON 7/24/2020 AT . THEY INFORMED TO FOLLOW UP WITHIN A WEEK.    RHONDA #: [phone]

## 2020-07-30 NOTE — PROGRESS NOTES
Subjective   Javier Torres is a 70 y.o. male. Follow-up from ER on 7/24/2020 who had dependent edema with erythema.    History of Present Illness     Swollen extremity-the patient says that about a week or so ago he went to the ER after being seen by the physician assistant.  He had been given a prescription for doxycycline for likely cellulitis.  However it had been known that he had also had a history of gout and was having some issues with getting that completely cleared.  The ER also gave him a short course of prednisone over about 4 days.  He says he was getting a lot better but then started getting a little worse once the prednisone was gone.  His leg is not nearly as red as it was and not quite as swollen but still pretty tender around that base of the first metatarsal.  Denies any fever or chills.  He is taking allopurinol 200 mg which his kidney doctor put him on and he was taking a little bit of Colcrys off and on.    I reviewed the notes and documentation from the ER as well as the physician assistant.    The following portions of the patient's history were reviewed and updated as appropriate: allergies, current medications, past family history, past medical history, past social history, past surgical history and problem list.    Review of Systems   Constitutional: Negative for fatigue and fever.   Respiratory: Negative for shortness of breath and wheezing.    Cardiovascular: Negative for chest pain and palpitations.   Gastrointestinal: Negative for constipation and nausea.   Musculoskeletal: Positive for arthralgias.   Skin: Positive for color change.       Objective   Physical Exam   Constitutional: He appears well-developed and well-nourished. No distress.   Cardiovascular: Normal rate, regular rhythm and normal heart sounds. Exam reveals no gallop and no friction rub.   No murmur heard.  Pulmonary/Chest: Effort normal and breath sounds normal. He has no wheezes. He has no rales.        Skin: Skin is  warm. Capillary refill takes less than 2 seconds. He is not diaphoretic.   Nursing note and vitals reviewed.      Assessment/Plan   Javier was seen today for follow-up.    Diagnoses and all orders for this visit:    Acute idiopathic gout involving toe of left foot  -     predniSONE (DELTASONE) 20 MG tablet; Take 2 tablets by mouth Daily for 3 days, THEN 1 tablet Daily for 3 days, THEN 0.5 tablets Daily for 3 days.      I think the prednisone was a decent choice for by the ER but I think it needed to go a little bit longer.  I am going to restart him on prednisone at 40 mg for 3 days and then do a taper over the next 6 days.  I explained to him that his blood sugar may increase and if it does go above 300 he should contact his endocrinologist for instructions.  I would use indomethacin but he is got a kidney problem.  Follow-up as needed.

## 2020-08-10 DIAGNOSIS — M10.9 GOUT OF FOOT, UNSPECIFIED CAUSE, UNSPECIFIED CHRONICITY, UNSPECIFIED LATERALITY: ICD-10-CM

## 2020-08-11 RX ORDER — COLCHICINE 0.6 MG/1
TABLET, FILM COATED ORAL
Qty: 30 TABLET | Refills: 0 | Status: SHIPPED | OUTPATIENT
Start: 2020-08-11 | End: 2020-08-27

## 2020-08-14 ENCOUNTER — OFFICE VISIT (OUTPATIENT)
Dept: FAMILY MEDICINE CLINIC | Facility: CLINIC | Age: 70
End: 2020-08-14

## 2020-08-14 VITALS
HEIGHT: 70 IN | HEART RATE: 122 BPM | BODY MASS INDEX: 35.22 KG/M2 | OXYGEN SATURATION: 98 % | TEMPERATURE: 99.2 F | DIASTOLIC BLOOD PRESSURE: 72 MMHG | SYSTOLIC BLOOD PRESSURE: 178 MMHG | RESPIRATION RATE: 17 BRPM | WEIGHT: 246 LBS

## 2020-08-14 DIAGNOSIS — M10.9 GOUT OF FOOT, UNSPECIFIED CAUSE, UNSPECIFIED CHRONICITY, UNSPECIFIED LATERALITY: Primary | ICD-10-CM

## 2020-08-14 DIAGNOSIS — M1A.9XX0 CHRONIC GOUT WITHOUT TOPHUS, UNSPECIFIED CAUSE, UNSPECIFIED SITE: ICD-10-CM

## 2020-08-14 DIAGNOSIS — R70.0 ELEVATED SEDIMENTATION RATE: ICD-10-CM

## 2020-08-14 DIAGNOSIS — R79.82 ELEVATED C-REACTIVE PROTEIN (CRP): ICD-10-CM

## 2020-08-14 DIAGNOSIS — M10.072 ACUTE IDIOPATHIC GOUT INVOLVING TOE OF LEFT FOOT: ICD-10-CM

## 2020-08-14 PROCEDURE — 99214 OFFICE O/P EST MOD 30 MIN: CPT | Performed by: PHYSICIAN ASSISTANT

## 2020-08-14 NOTE — PROGRESS NOTES
Subjective   Javier Torres is a 70 y.o. male who presents today for follow up on gout, not any better since last office visit.     History of Present Illness     Patient has pain and swelling of the left knee.  He has no redness, heat.  He continues with bilateral lower extremity edema.  He was concerned this could be related to gout and wanted prednisone refilled.  Pain is not quite as bad as previous but is worsening.    He has a rash on his left leg but is unsure etiology of rash.    The following portions of the patient's history were reviewed and updated as appropriate: allergies, current medications, past family history, past medical history, past social history, past surgical history and problem list.    Review of Systems   Constitutional: Negative.    HENT: Negative.    Respiratory: Negative.    Cardiovascular: Negative.    Gastrointestinal: Negative.    Musculoskeletal: Positive for gait problem.        Knee pain (left)   Skin: Positive for rash.   Neurological: Negative for dizziness and light-headedness.       Objective   Vitals:    08/14/20 1210   BP: 178/72   Pulse: (!) 122   Resp: 17   Temp: 99.2 °F (37.3 °C)   SpO2: 98%     Body mass index is 35.3 kg/m².    Physical Exam   Constitutional: He is oriented to person, place, and time. He appears well-developed.   HENT:   Head: Normocephalic and atraumatic.   Right Ear: External ear normal.   Left Ear: External ear normal.   Eyes: Conjunctivae are normal.   Neck: Carotid bruit is not present. No tracheal deviation present. No thyroid mass and no thyromegaly present.   Cardiovascular: Normal rate and regular rhythm.   Murmur heard.  Pulmonary/Chest: Effort normal and breath sounds normal.   Musculoskeletal: He exhibits edema ( 2+ pitting lower extremities).   Neurological: He is alert and oriented to person, place, and time. Gait normal.   Skin: Skin is warm and dry.   Psychiatric: He has a normal mood and affect. His behavior is normal. Judgment and  thought content normal.   Nursing note and vitals reviewed.      Assessment/Plan   Javier was seen today for follow-up.    Diagnoses and all orders for this visit:    Gout of foot, unspecified cause, unspecified chronicity, unspecified laterality  -     SATINDER With / DsDNA, RNP, Sjogrens A / B, Smith  -     C-reactive Protein  -     Cyclic Citrul Peptide Antibody, IgG / IgA  -     Rheumatoid Factor  -     Sedimentation Rate  -     Uric Acid  -     CBC & Differential    Acute idiopathic gout involving toe of left foot    Chronic gout without tophus, unspecified cause, unspecified site  -     SATINDER With / DsDNA, RNP, Sjogrens A / B, Smith  -     C-reactive Protein  -     Cyclic Citrul Peptide Antibody, IgG / IgA  -     Rheumatoid Factor  -     Sedimentation Rate  -     Uric Acid  -     CBC & Differential    Elevated C-reactive protein (CRP)  -     SATINDER With / DsDNA, RNP, Sjogrens A / B, Smith  -     C-reactive Protein  -     Cyclic Citrul Peptide Antibody, IgG / IgA  -     Rheumatoid Factor  -     Sedimentation Rate  -     Uric Acid  -     CBC & Differential    Elevated sedimentation rate  -     SATINDER With / DsDNA, RNP, Sjogrens A / B, Smith  -     C-reactive Protein  -     Cyclic Citrul Peptide Antibody, IgG / IgA  -     Rheumatoid Factor  -     Sedimentation Rate  -     Uric Acid  -     CBC & Differential    Assessment and Plan  70 y.o. male who presents today for follow up on gout, not any better since last office visit. Patient has pain and swelling of his left knee without redness or heat.  He continues with bilateral lower extremity edema.  He was concerned this could be related to gout and wanted prednisone refilled.  Patient received prednisone from the ER then again from Dr. Humphrey 7/30/2020.  His last A1c was 11.2% 6/30/2020.  Pain is not quite as bad as previous but is worsening. He has a rash on his left leg but is unsure etiology of rash.  My concern would be this could be shingles which could be contributing to  his left leg pain.  I contacted Dr. Rubin, nephrology, who agrees with withholding prednisone due to uncontrolled blood sugars.  He is on allopurinol 200 mg daily and Dr. Humphrey restarted colchicine daily.  I will recheck his labs today.  I will check full autoimmune testing as well with elevated inflammatory markers at the ER.  Patient does have significant edema.  Per Dr. Rubin, he should double his Lasix for 1 week.  We have in the chart that he is taking 20 mg twice daily in per nephrology should increase to 40 mg twice daily for 1 week.  I attempted to reach them and left message with directions.  Dr. Rubin also gave clearance for Valtrex.  I am awaiting labs and response to Lasix to see if we need to give Valtrex.  We will check on him once results are in.  Patient to be seen ASAP if worsening, new or changing symptoms.  Of note, patient has extremely elevated blood pressures and pulses in the office every visit.  He is followed by nephrology and really follows with Dr. Humphrey.  We will see how his blood pressure readings are with Lasix.  However, he will likely need to follow-up with his primary care for blood pressure management.  Is not a new finding and has been significantly elevated at each visit recently and was sent to the ER for tachycardia with no findings.  He may need to see cardiology.

## 2020-08-17 LAB
ANA SER QL: NEGATIVE
BASOPHILS # BLD AUTO: 0.04 10*3/MM3 (ref 0–0.2)
BASOPHILS NFR BLD AUTO: 0.6 % (ref 0–1.5)
CCP IGA+IGG SERPL IA-ACNC: 4 UNITS (ref 0–19)
CRP SERPL-MCNC: 0.94 MG/DL (ref 0–0.5)
EOSINOPHIL # BLD AUTO: 0.24 10*3/MM3 (ref 0–0.4)
EOSINOPHIL NFR BLD AUTO: 3.5 % (ref 0.3–6.2)
ERYTHROCYTE [DISTWIDTH] IN BLOOD BY AUTOMATED COUNT: 13.9 % (ref 12.3–15.4)
ERYTHROCYTE [SEDIMENTATION RATE] IN BLOOD BY WESTERGREN METHOD: 52 MM/HR (ref 0–20)
HCT VFR BLD AUTO: 37 % (ref 37.5–51)
HGB BLD-MCNC: 12 G/DL (ref 13–17.7)
IMM GRANULOCYTES # BLD AUTO: 0.02 10*3/MM3 (ref 0–0.05)
IMM GRANULOCYTES NFR BLD AUTO: 0.3 % (ref 0–0.5)
LYMPHOCYTES # BLD AUTO: 0.65 10*3/MM3 (ref 0.7–3.1)
LYMPHOCYTES NFR BLD AUTO: 9.5 % (ref 19.6–45.3)
MCH RBC QN AUTO: 27.6 PG (ref 26.6–33)
MCHC RBC AUTO-ENTMCNC: 32.4 G/DL (ref 31.5–35.7)
MCV RBC AUTO: 85.3 FL (ref 79–97)
MONOCYTES # BLD AUTO: 0.57 10*3/MM3 (ref 0.1–0.9)
MONOCYTES NFR BLD AUTO: 8.4 % (ref 5–12)
NEUTROPHILS # BLD AUTO: 5.3 10*3/MM3 (ref 1.7–7)
NEUTROPHILS NFR BLD AUTO: 77.7 % (ref 42.7–76)
NRBC BLD AUTO-RTO: 0 /100 WBC (ref 0–0.2)
PLATELET # BLD AUTO: 229 10*3/MM3 (ref 140–450)
RBC # BLD AUTO: 4.34 10*6/MM3 (ref 4.14–5.8)
RHEUMATOID FACT SERPL-ACNC: 10.5 IU/ML (ref 0–13.9)
URATE SERPL-MCNC: 5.4 MG/DL (ref 3.4–7)
WBC # BLD AUTO: 6.82 10*3/MM3 (ref 3.4–10.8)

## 2020-08-20 ENCOUNTER — PATIENT OUTREACH (OUTPATIENT)
Dept: CASE MANAGEMENT | Facility: OTHER | Age: 70
End: 2020-08-20

## 2020-08-20 ENCOUNTER — TELEPHONE (OUTPATIENT)
Dept: INTERNAL MEDICINE | Facility: CLINIC | Age: 70
End: 2020-08-20

## 2020-08-20 NOTE — TELEPHONE ENCOUNTER
Patient called and requested a call back regarding lab results     Please advise     [phone]

## 2020-08-20 NOTE — OUTREACH NOTE
Patient Outreach Note    Outreach attempts x 4 complete. All attempts unsuccessful. Patient in ED for gout and dependent edema on 7/24. Patient has followed up with Dr. Humphrey' office on 8/14/20. No further ACM outreach scheduled. Message sent to [place] practice with update. AWV still needed at this time.     Chanda Flores RN  Ambulatory     8/20/2020, 13:50

## 2020-08-21 DIAGNOSIS — E11.21 TYPE 2 DIABETES WITH NEPHROPATHY (HCC): ICD-10-CM

## 2020-08-21 NOTE — TELEPHONE ENCOUNTER
Please see result note.  I never heard back from them after I called to leave a message about his directions for diuretic.    CRP is almost back to normal.  Sed rate remains elevated but is improving.  Uric is completely normal.  He does not need any adjustment in his allopurinol and does not need to take colchicine.  This is not from gout.  Please see if he increased his diuretic as directed for the week and how he is feeling.  Please find out how blood pressure and pulse are running, if he still has rash and swelling lower extremities.  We may need to refer to cardiology for evaluation as he has had persistently elevated blood pressures and tachycardia.

## 2020-08-27 ENCOUNTER — OFFICE VISIT (OUTPATIENT)
Dept: FAMILY MEDICINE CLINIC | Facility: CLINIC | Age: 70
End: 2020-08-27

## 2020-08-27 VITALS
OXYGEN SATURATION: 97 % | DIASTOLIC BLOOD PRESSURE: 74 MMHG | BODY MASS INDEX: 35.56 KG/M2 | WEIGHT: 248.4 LBS | RESPIRATION RATE: 17 BRPM | SYSTOLIC BLOOD PRESSURE: 180 MMHG | HEART RATE: 100 BPM | HEIGHT: 70 IN | TEMPERATURE: 97 F

## 2020-08-27 DIAGNOSIS — N18.30 CHRONIC KIDNEY DISEASE, STAGE III (MODERATE) (HCC): ICD-10-CM

## 2020-08-27 DIAGNOSIS — M1A.9XX0 CHRONIC GOUT WITHOUT TOPHUS, UNSPECIFIED CAUSE, UNSPECIFIED SITE: ICD-10-CM

## 2020-08-27 DIAGNOSIS — R60.0 EDEMA OF LOWER EXTREMITY: Primary | ICD-10-CM

## 2020-08-27 LAB
ALBUMIN SERPL-MCNC: 4.4 G/DL (ref 3.5–5.2)
ALBUMIN/GLOB SERPL: 2.1 G/DL
ALP SERPL-CCNC: 64 U/L (ref 39–117)
ALT SERPL-CCNC: 17 U/L (ref 1–41)
AST SERPL-CCNC: 18 U/L (ref 1–40)
BILIRUB SERPL-MCNC: 0.2 MG/DL (ref 0–1.2)
BUN SERPL-MCNC: 34 MG/DL (ref 8–23)
BUN/CREAT SERPL: 17.8 (ref 7–25)
CALCIUM SERPL-MCNC: 9.5 MG/DL (ref 8.6–10.5)
CHLORIDE SERPL-SCNC: 100 MMOL/L (ref 98–107)
CO2 SERPL-SCNC: 28.5 MMOL/L (ref 22–29)
CREAT SERPL-MCNC: 1.91 MG/DL (ref 0.76–1.27)
GLOBULIN SER CALC-MCNC: 2.1 GM/DL
GLUCOSE SERPL-MCNC: 118 MG/DL (ref 65–99)
POTASSIUM SERPL-SCNC: 4.2 MMOL/L (ref 3.5–5.2)
PROT SERPL-MCNC: 6.5 G/DL (ref 6–8.5)
SODIUM SERPL-SCNC: 140 MMOL/L (ref 136–145)

## 2020-08-27 PROCEDURE — 99213 OFFICE O/P EST LOW 20 MIN: CPT | Performed by: FAMILY MEDICINE

## 2020-08-27 RX ORDER — HYDRALAZINE HYDROCHLORIDE 25 MG/1
TABLET, FILM COATED ORAL
COMMUNITY
Start: 2020-08-16 | End: 2020-08-27

## 2020-08-27 NOTE — PATIENT INSTRUCTIONS
Edema    Edema is when you have too much fluid in your body or under your skin. Edema may make your legs, feet, and ankles swell up. Swelling is also common in looser tissues, like around your eyes. This is a common condition. It gets more common as you get older. There are many possible causes of edema. Eating too much salt (sodium) and being on your feet or sitting for a long time can cause edema in your legs, feet, and ankles. Hot weather may make edema worse.  Edema is usually painless. Your skin may look swollen or shiny.  Follow these instructions at home:  · Keep the swollen body part raised (elevated) above the level of your heart when you are sitting or lying down.  · Do not sit still or stand for a long time.  · Do not wear tight clothes. Do not wear garters on your upper legs.  · Exercise your legs. This can help the swelling go down.  · Wear elastic bandages or support stockings as told by your doctor.  · Eat a low-salt (low-sodium) diet to reduce fluid as told by your doctor.  · Depending on the cause of your swelling, you may need to limit how much fluid you drink (fluid restriction).  · Take over-the-counter and prescription medicines only as told by your doctor.  Contact a doctor if:  · Treatment is not working.  · You have heart, liver, or kidney disease and have symptoms of edema.  · You have sudden and unexplained weight gain.  Get help right away if:  · You have shortness of breath or chest pain.  · You cannot breathe when you lie down.  · You have pain, redness, or warmth in the swollen areas.  · You have heart, liver, or kidney disease and get edema all of a sudden.  · You have a fever and your symptoms get worse all of a sudden.  Summary  · Edema is when you have too much fluid in your body or under your skin.  · Edema may make your legs, feet, and ankles swell up. Swelling is also common in looser tissues, like around your eyes.  · Raise (elevate) the swollen body part above the level of your  heart when you are sitting or lying down.  · Follow your doctor's instructions about diet and how much fluid you can drink (fluid restriction).  This information is not intended to replace advice given to you by your health care provider. Make sure you discuss any questions you have with your health care provider.  Document Released: 06/05/2009 Document Revised: 12/21/2018 Document Reviewed: 01/05/2018  Elsevier Patient Education © 2020 [employer] Inc[employer]

## 2020-08-27 NOTE — PROGRESS NOTES
Subjective   Javier Torres is a 70 y.o. male. Follow-up on dependent edema.             Swollen extremities  -interval history, he has been feeling somewhat better and says that the pain is gone when he is walking around.  The erythema is also gone but he is still having some issues with swelling in both of his extremities.  There was some confusion as to when he was supposed to increase his Lasix so that did not happen as Dr. Rubin had asked.  After he did have been there was a short period of time where his weight went down but his weight has been going back up again.  He has gained around 20 pounds based on our scales over the past month.  However he has significant kidney disease making it difficult to decide what to use.    The following portions of the patient's history were reviewed and updated as appropriate: allergies, current medications, past family history, past medical history, past social history, past surgical history and problem list.    Review of Systems   Constitutional: Negative for fatigue and fever.   Respiratory: Negative for shortness of breath and wheezing.    Cardiovascular: Positive for leg swelling. Negative for chest pain and palpitations.   Gastrointestinal: Negative for constipation and nausea.   Musculoskeletal: Negative for arthralgias.   Skin: Negative for color change.       Objective   Physical Exam   Constitutional: He appears well-developed and well-nourished. No distress.   HENT:   Right Ear: External ear normal.   Left Ear: External ear normal.   Nose: Nose normal.   Eyes: Conjunctivae are normal. Right eye exhibits no discharge. Left eye exhibits no discharge.   Cardiovascular: Normal rate, regular rhythm and normal heart sounds. Exam reveals no gallop and no friction rub.   No murmur heard.  Pulmonary/Chest: Effort normal and breath sounds normal. He has no wheezes. He has no rales.   Musculoskeletal: He exhibits edema.   Skin: Skin is warm. Capillary refill takes less than  2 seconds. He is not diaphoretic.   Nursing note and vitals reviewed.          Assessment/Plan   Javier was seen today for follow-up.    Diagnoses and all orders for this visit:    Edema of lower extremity  -     Comprehensive Metabolic Panel    Chronic kidney disease, stage III (moderate) (CMS/HCC)  -     Comprehensive Metabolic Panel  -     Comprehensive Metabolic Panel; Future  -     CBC (No Diff); Future    Chronic gout without tophus, unspecified cause, unspecified site  -     Uric acid; Future      I have written for a CMP for today to see how much wiggle room there is for the Lasix and in the meantime I want them to get in touch with Dr. Rubin as he was prescribing the Lasix and see if he wants to move to something different.  Then I will order some labs for about a month from now.  So follow-up in 1 month.

## 2020-08-28 DIAGNOSIS — K21.9 GASTROESOPHAGEAL REFLUX DISEASE, ESOPHAGITIS PRESENCE NOT SPECIFIED: ICD-10-CM

## 2020-08-28 RX ORDER — FAMOTIDINE 20 MG/1
20 TABLET, FILM COATED ORAL 2 TIMES DAILY
Qty: 180 TABLET | Refills: 3 | Status: SHIPPED | OUTPATIENT
Start: 2020-08-28 | End: 2020-11-19

## 2020-08-28 RX ORDER — RANITIDINE 150 MG/1
150 TABLET ORAL 2 TIMES DAILY PRN
Qty: 180 TABLET | Status: CANCELLED | OUTPATIENT
Start: 2020-08-28

## 2020-09-05 DIAGNOSIS — E11.21 TYPE 2 DIABETES WITH NEPHROPATHY (HCC): ICD-10-CM

## 2020-09-09 ENCOUNTER — TELEPHONE (OUTPATIENT)
Dept: INTERNAL MEDICINE | Facility: CLINIC | Age: 70
End: 2020-09-09

## 2020-09-09 DIAGNOSIS — M10.09 ACUTE IDIOPATHIC GOUT OF MULTIPLE SITES: Primary | ICD-10-CM

## 2020-09-09 RX ORDER — PREDNISONE 20 MG/1
TABLET ORAL
Qty: 14 TABLET | Refills: 0 | Status: SHIPPED | OUTPATIENT
Start: 2020-09-09 | End: 2020-09-19

## 2020-09-09 NOTE — TELEPHONE ENCOUNTER
PATIENTS POA CALLING IN STATING SHE BELIEVES PATIENT HAS GOUT, FEET ARE SWOLLEN, STARTED LATE Sunday 9/6, PATIENT IS HAVING DIFFICULTY WALKING    POA, NANDA IS WANTING PREDNISONE 20MG, AND COLCRYS 0.6MG.      PLEASE CALL AND ADVISE    SENT TO [facility] - [address] - [phone]  - [phone]   [phone]

## 2020-09-09 NOTE — TELEPHONE ENCOUNTER
No problem anytime a pt gives me a doc that they see I list it in there so I can find things easier.

## 2020-09-09 NOTE — TELEPHONE ENCOUNTER
Thank you.  I am unfamiliar with a care team and I do not trust much of what Epic tells me as it is a lot of times out of date or incorrect.

## 2020-09-09 NOTE — TELEPHONE ENCOUNTER
Great!  I am probably going to enlist his kidney doctor to help with this given it is hamstringing what I can do with him.

## 2020-09-22 ENCOUNTER — APPOINTMENT (OUTPATIENT)
Dept: GENERAL RADIOLOGY | Facility: HOSPITAL | Age: 70
End: 2020-09-22

## 2020-09-22 ENCOUNTER — HOSPITAL ENCOUNTER (INPATIENT)
Facility: HOSPITAL | Age: 70
LOS: 4 days | Discharge: NURSING, REHAB OR LONG-TERM CARE | End: 2020-09-28
Attending: EMERGENCY MEDICINE | Admitting: HOSPITALIST

## 2020-09-22 DIAGNOSIS — M10.09 ACUTE IDIOPATHIC GOUT OF MULTIPLE SITES: ICD-10-CM

## 2020-09-22 DIAGNOSIS — M11.262 PSEUDOGOUT OF KNEE, LEFT: ICD-10-CM

## 2020-09-22 DIAGNOSIS — J69.0 ASPIRATION PNEUMONIA DUE TO GASTRIC SECRETIONS, UNSPECIFIED LATERALITY, UNSPECIFIED PART OF LUNG (HCC): Primary | ICD-10-CM

## 2020-09-22 LAB
ALBUMIN SERPL-MCNC: 3.6 G/DL (ref 3.5–5.2)
ALBUMIN/GLOB SERPL: 1.2 G/DL
ALP SERPL-CCNC: 61 U/L (ref 39–117)
ALT SERPL W P-5'-P-CCNC: 16 U/L (ref 1–41)
ANION GAP SERPL CALCULATED.3IONS-SCNC: 12.6 MMOL/L (ref 5–15)
AST SERPL-CCNC: 11 U/L (ref 1–40)
B PARAPERT DNA SPEC QL NAA+PROBE: NOT DETECTED
B PERT DNA SPEC QL NAA+PROBE: NOT DETECTED
BACTERIA UR QL AUTO: NORMAL /HPF
BASOPHILS # BLD AUTO: 0.02 10*3/MM3 (ref 0–0.2)
BASOPHILS NFR BLD AUTO: 0.1 % (ref 0–1.5)
BILIRUB SERPL-MCNC: 0.8 MG/DL (ref 0–1.2)
BILIRUB UR QL STRIP: NEGATIVE
BUN SERPL-MCNC: 36 MG/DL (ref 8–23)
BUN/CREAT SERPL: 16.9 (ref 7–25)
C PNEUM DNA NPH QL NAA+NON-PROBE: NOT DETECTED
CALCIUM SPEC-SCNC: 9.1 MG/DL (ref 8.6–10.5)
CHLORIDE SERPL-SCNC: 94 MMOL/L (ref 98–107)
CLARITY UR: CLEAR
CO2 SERPL-SCNC: 24.4 MMOL/L (ref 22–29)
COLOR UR: YELLOW
CREAT SERPL-MCNC: 2.13 MG/DL (ref 0.76–1.27)
D-LACTATE SERPL-SCNC: 1.9 MMOL/L (ref 0.5–2)
DEPRECATED RDW RBC AUTO: 44.2 FL (ref 37–54)
EOSINOPHIL # BLD AUTO: 0 10*3/MM3 (ref 0–0.4)
EOSINOPHIL NFR BLD AUTO: 0 % (ref 0.3–6.2)
ERYTHROCYTE [DISTWIDTH] IN BLOOD BY AUTOMATED COUNT: 14.2 % (ref 12.3–15.4)
FLUAV H1 2009 PAND RNA NPH QL NAA+PROBE: NOT DETECTED
FLUAV H1 HA GENE NPH QL NAA+PROBE: NOT DETECTED
FLUAV H3 RNA NPH QL NAA+PROBE: NOT DETECTED
FLUAV SUBTYP SPEC NAA+PROBE: NOT DETECTED
FLUBV RNA ISLT QL NAA+PROBE: NOT DETECTED
GFR SERPL CREATININE-BSD FRML MDRD: 31 ML/MIN/1.73
GLOBULIN UR ELPH-MCNC: 3.1 GM/DL
GLUCOSE SERPL-MCNC: 361 MG/DL (ref 65–99)
GLUCOSE UR STRIP-MCNC: ABNORMAL MG/DL
HADV DNA SPEC NAA+PROBE: NOT DETECTED
HCOV 229E RNA SPEC QL NAA+PROBE: NOT DETECTED
HCOV HKU1 RNA SPEC QL NAA+PROBE: NOT DETECTED
HCOV NL63 RNA SPEC QL NAA+PROBE: NOT DETECTED
HCOV OC43 RNA SPEC QL NAA+PROBE: NOT DETECTED
HCT VFR BLD AUTO: 34 % (ref 37.5–51)
HGB BLD-MCNC: 10.8 G/DL (ref 13–17.7)
HGB UR QL STRIP.AUTO: NEGATIVE
HMPV RNA NPH QL NAA+NON-PROBE: NOT DETECTED
HPIV1 RNA SPEC QL NAA+PROBE: NOT DETECTED
HPIV2 RNA SPEC QL NAA+PROBE: NOT DETECTED
HPIV3 RNA NPH QL NAA+PROBE: NOT DETECTED
HPIV4 P GENE NPH QL NAA+PROBE: NOT DETECTED
HYALINE CASTS UR QL AUTO: NORMAL /LPF
IMM GRANULOCYTES # BLD AUTO: 0.07 10*3/MM3 (ref 0–0.05)
IMM GRANULOCYTES NFR BLD AUTO: 0.5 % (ref 0–0.5)
KETONES UR QL STRIP: NEGATIVE
LDH SERPL-CCNC: 243 U/L (ref 135–225)
LEUKOCYTE ESTERASE UR QL STRIP.AUTO: NEGATIVE
LYMPHOCYTES # BLD AUTO: 0.5 10*3/MM3 (ref 0.7–3.1)
LYMPHOCYTES NFR BLD AUTO: 3.5 % (ref 19.6–45.3)
M PNEUMO IGG SER IA-ACNC: NOT DETECTED
MCH RBC QN AUTO: 27.1 PG (ref 26.6–33)
MCHC RBC AUTO-ENTMCNC: 31.8 G/DL (ref 31.5–35.7)
MCV RBC AUTO: 85.4 FL (ref 79–97)
MONOCYTES # BLD AUTO: 1.43 10*3/MM3 (ref 0.1–0.9)
MONOCYTES NFR BLD AUTO: 10 % (ref 5–12)
NEUTROPHILS NFR BLD AUTO: 12.32 10*3/MM3 (ref 1.7–7)
NEUTROPHILS NFR BLD AUTO: 85.9 % (ref 42.7–76)
NITRITE UR QL STRIP: NEGATIVE
NRBC BLD AUTO-RTO: 0 /100 WBC (ref 0–0.2)
NT-PROBNP SERPL-MCNC: 204.5 PG/ML (ref 0–900)
PH UR STRIP.AUTO: <=5 [PH] (ref 5–8)
PLATELET # BLD AUTO: 275 10*3/MM3 (ref 140–450)
PMV BLD AUTO: 9 FL (ref 6–12)
POTASSIUM SERPL-SCNC: 4.6 MMOL/L (ref 3.5–5.2)
PROCALCITONIN SERPL-MCNC: 1.34 NG/ML (ref 0–0.25)
PROT SERPL-MCNC: 6.7 G/DL (ref 6–8.5)
PROT UR QL STRIP: ABNORMAL
RBC # BLD AUTO: 3.98 10*6/MM3 (ref 4.14–5.8)
RBC # UR: NORMAL /HPF
REF LAB TEST METHOD: NORMAL
RHINOVIRUS RNA SPEC NAA+PROBE: NOT DETECTED
RSV RNA NPH QL NAA+NON-PROBE: NOT DETECTED
SARS-COV-2 RNA NPH QL NAA+NON-PROBE: NOT DETECTED
SODIUM SERPL-SCNC: 131 MMOL/L (ref 136–145)
SP GR UR STRIP: 1.01 (ref 1–1.03)
SQUAMOUS #/AREA URNS HPF: NORMAL /HPF
UROBILINOGEN UR QL STRIP: ABNORMAL
WBC # BLD AUTO: 14.34 10*3/MM3 (ref 3.4–10.8)
WBC UR QL AUTO: NORMAL /HPF

## 2020-09-22 PROCEDURE — 81001 URINALYSIS AUTO W/SCOPE: CPT | Performed by: NURSE PRACTITIONER

## 2020-09-22 PROCEDURE — 36415 COLL VENOUS BLD VENIPUNCTURE: CPT

## 2020-09-22 PROCEDURE — 84145 PROCALCITONIN (PCT): CPT | Performed by: NURSE PRACTITIONER

## 2020-09-22 PROCEDURE — 85025 COMPLETE CBC W/AUTO DIFF WBC: CPT | Performed by: NURSE PRACTITIONER

## 2020-09-22 PROCEDURE — 25010000002 CEFTRIAXONE PER 250 MG: Performed by: NURSE PRACTITIONER

## 2020-09-22 PROCEDURE — 0202U NFCT DS 22 TRGT SARS-COV-2: CPT | Performed by: NURSE PRACTITIONER

## 2020-09-22 PROCEDURE — 80053 COMPREHEN METABOLIC PANEL: CPT | Performed by: NURSE PRACTITIONER

## 2020-09-22 PROCEDURE — 71045 X-RAY EXAM CHEST 1 VIEW: CPT

## 2020-09-22 PROCEDURE — 99285 EMERGENCY DEPT VISIT HI MDM: CPT

## 2020-09-22 PROCEDURE — 87040 BLOOD CULTURE FOR BACTERIA: CPT | Performed by: NURSE PRACTITIONER

## 2020-09-22 PROCEDURE — G0378 HOSPITAL OBSERVATION PER HR: HCPCS

## 2020-09-22 PROCEDURE — 83880 ASSAY OF NATRIURETIC PEPTIDE: CPT | Performed by: NURSE PRACTITIONER

## 2020-09-22 PROCEDURE — 83615 LACTATE (LD) (LDH) ENZYME: CPT | Performed by: NURSE PRACTITIONER

## 2020-09-22 PROCEDURE — 83605 ASSAY OF LACTIC ACID: CPT | Performed by: NURSE PRACTITIONER

## 2020-09-22 RX ORDER — SODIUM CHLORIDE 0.9 % (FLUSH) 0.9 %
10 SYRINGE (ML) INJECTION AS NEEDED
Status: DISCONTINUED | OUTPATIENT
Start: 2020-09-22 | End: 2020-09-28 | Stop reason: HOSPADM

## 2020-09-22 RX ORDER — LABETALOL HYDROCHLORIDE 5 MG/ML
10 INJECTION, SOLUTION INTRAVENOUS EVERY 6 HOURS PRN
Status: DISCONTINUED | OUTPATIENT
Start: 2020-09-22 | End: 2020-09-28 | Stop reason: HOSPADM

## 2020-09-22 RX ORDER — CEFTRIAXONE SODIUM 1 G/50ML
1 INJECTION, SOLUTION INTRAVENOUS ONCE
Status: COMPLETED | OUTPATIENT
Start: 2020-09-22 | End: 2020-09-23

## 2020-09-22 RX ORDER — AMLODIPINE BESYLATE 5 MG/1
10 TABLET ORAL ONCE
Status: COMPLETED | OUTPATIENT
Start: 2020-09-22 | End: 2020-09-22

## 2020-09-22 RX ADMIN — AMLODIPINE BESYLATE 10 MG: 5 TABLET ORAL at 21:48

## 2020-09-22 RX ADMIN — CEFTRIAXONE SODIUM 1 G: 1 INJECTION, SOLUTION INTRAVENOUS at 23:41

## 2020-09-22 RX ADMIN — SODIUM CHLORIDE, POTASSIUM CHLORIDE, SODIUM LACTATE AND CALCIUM CHLORIDE 1000 ML: 600; 310; 30; 20 INJECTION, SOLUTION INTRAVENOUS at 21:20

## 2020-09-23 PROBLEM — R50.9 FEVER: Status: ACTIVE | Noted: 2020-09-23

## 2020-09-23 PROBLEM — N17.9 AKI (ACUTE KIDNEY INJURY) (HCC): Status: ACTIVE | Noted: 2020-09-23

## 2020-09-23 PROBLEM — A41.9 SEPSIS (HCC): Status: ACTIVE | Noted: 2020-09-23

## 2020-09-23 LAB
ALBUMIN SERPL-MCNC: 3.2 G/DL (ref 3.5–5.2)
ALBUMIN/GLOB SERPL: 1.3 G/DL
ALP SERPL-CCNC: 51 U/L (ref 39–117)
ALT SERPL W P-5'-P-CCNC: 14 U/L (ref 1–41)
ANION GAP SERPL CALCULATED.3IONS-SCNC: 9.8 MMOL/L (ref 5–15)
AST SERPL-CCNC: 8 U/L (ref 1–40)
BASOPHILS # BLD AUTO: 0.03 10*3/MM3 (ref 0–0.2)
BASOPHILS NFR BLD AUTO: 0.4 % (ref 0–1.5)
BILIRUB SERPL-MCNC: 0.5 MG/DL (ref 0–1.2)
BUN SERPL-MCNC: 31 MG/DL (ref 8–23)
BUN/CREAT SERPL: 17.5 (ref 7–25)
CALCIUM SPEC-SCNC: 8.7 MG/DL (ref 8.6–10.5)
CHLORIDE SERPL-SCNC: 99 MMOL/L (ref 98–107)
CO2 SERPL-SCNC: 26.2 MMOL/L (ref 22–29)
CREAT SERPL-MCNC: 1.77 MG/DL (ref 0.76–1.27)
DEPRECATED RDW RBC AUTO: 43.3 FL (ref 37–54)
EOSINOPHIL # BLD AUTO: 0.07 10*3/MM3 (ref 0–0.4)
EOSINOPHIL NFR BLD AUTO: 0.8 % (ref 0.3–6.2)
ERYTHROCYTE [DISTWIDTH] IN BLOOD BY AUTOMATED COUNT: 14.2 % (ref 12.3–15.4)
GFR SERPL CREATININE-BSD FRML MDRD: 38 ML/MIN/1.73
GLOBULIN UR ELPH-MCNC: 2.5 GM/DL
GLUCOSE BLDC GLUCOMTR-MCNC: 196 MG/DL (ref 70–130)
GLUCOSE BLDC GLUCOMTR-MCNC: 270 MG/DL (ref 70–130)
GLUCOSE BLDC GLUCOMTR-MCNC: 278 MG/DL (ref 70–130)
GLUCOSE BLDC GLUCOMTR-MCNC: 377 MG/DL (ref 70–130)
GLUCOSE SERPL-MCNC: 275 MG/DL (ref 65–99)
HBA1C MFR BLD: 7.8 % (ref 4.8–5.6)
HCT VFR BLD AUTO: 31.3 % (ref 37.5–51)
HGB BLD-MCNC: 10 G/DL (ref 13–17.7)
IMM GRANULOCYTES # BLD AUTO: 0.05 10*3/MM3 (ref 0–0.05)
IMM GRANULOCYTES NFR BLD AUTO: 0.6 % (ref 0–0.5)
INR PPP: 2.51 (ref 0.9–1.1)
LYMPHOCYTES # BLD AUTO: 0.74 10*3/MM3 (ref 0.7–3.1)
LYMPHOCYTES NFR BLD AUTO: 8.9 % (ref 19.6–45.3)
MAGNESIUM SERPL-MCNC: 2.1 MG/DL (ref 1.6–2.4)
MCH RBC QN AUTO: 26.7 PG (ref 26.6–33)
MCHC RBC AUTO-ENTMCNC: 31.9 G/DL (ref 31.5–35.7)
MCV RBC AUTO: 83.7 FL (ref 79–97)
MONOCYTES # BLD AUTO: 1.08 10*3/MM3 (ref 0.1–0.9)
MONOCYTES NFR BLD AUTO: 13 % (ref 5–12)
NEUTROPHILS NFR BLD AUTO: 6.36 10*3/MM3 (ref 1.7–7)
NEUTROPHILS NFR BLD AUTO: 76.3 % (ref 42.7–76)
NRBC BLD AUTO-RTO: 0 /100 WBC (ref 0–0.2)
PLATELET # BLD AUTO: 217 10*3/MM3 (ref 140–450)
PMV BLD AUTO: 9.3 FL (ref 6–12)
POTASSIUM SERPL-SCNC: 4.2 MMOL/L (ref 3.5–5.2)
PROCALCITONIN SERPL-MCNC: 2.51 NG/ML (ref 0–0.25)
PROT SERPL-MCNC: 5.7 G/DL (ref 6–8.5)
PROTHROMBIN TIME: 26.3 SECONDS (ref 11.7–14.2)
RBC # BLD AUTO: 3.74 10*6/MM3 (ref 4.14–5.8)
SODIUM SERPL-SCNC: 135 MMOL/L (ref 136–145)
WBC # BLD AUTO: 8.33 10*3/MM3 (ref 3.4–10.8)

## 2020-09-23 PROCEDURE — 83036 HEMOGLOBIN GLYCOSYLATED A1C: CPT | Performed by: NURSE PRACTITIONER

## 2020-09-23 PROCEDURE — 80053 COMPREHEN METABOLIC PANEL: CPT | Performed by: NURSE PRACTITIONER

## 2020-09-23 PROCEDURE — G0378 HOSPITAL OBSERVATION PER HR: HCPCS

## 2020-09-23 PROCEDURE — 92610 EVALUATE SWALLOWING FUNCTION: CPT

## 2020-09-23 PROCEDURE — 63710000001 INSULIN GLARGINE PER 5 UNITS: Performed by: NURSE PRACTITIONER

## 2020-09-23 PROCEDURE — 83735 ASSAY OF MAGNESIUM: CPT | Performed by: NURSE PRACTITIONER

## 2020-09-23 PROCEDURE — 84145 PROCALCITONIN (PCT): CPT | Performed by: NURSE PRACTITIONER

## 2020-09-23 PROCEDURE — 82962 GLUCOSE BLOOD TEST: CPT

## 2020-09-23 PROCEDURE — 25010000002 CEFTRIAXONE PER 250 MG: Performed by: NURSE PRACTITIONER

## 2020-09-23 PROCEDURE — 85610 PROTHROMBIN TIME: CPT | Performed by: NURSE PRACTITIONER

## 2020-09-23 PROCEDURE — 85025 COMPLETE CBC W/AUTO DIFF WBC: CPT | Performed by: NURSE PRACTITIONER

## 2020-09-23 PROCEDURE — 25010000002 AZITHROMYCIN PER 500 MG: Performed by: NURSE PRACTITIONER

## 2020-09-23 PROCEDURE — 63710000001 INSULIN LISPRO (HUMAN) PER 5 UNITS: Performed by: NURSE PRACTITIONER

## 2020-09-23 RX ORDER — ACETAMINOPHEN 160 MG/5ML
650 SOLUTION ORAL EVERY 4 HOURS PRN
Status: DISCONTINUED | OUTPATIENT
Start: 2020-09-23 | End: 2020-09-28 | Stop reason: HOSPADM

## 2020-09-23 RX ORDER — ONDANSETRON 2 MG/ML
4 INJECTION INTRAMUSCULAR; INTRAVENOUS EVERY 6 HOURS PRN
Status: DISCONTINUED | OUTPATIENT
Start: 2020-09-23 | End: 2020-09-28 | Stop reason: HOSPADM

## 2020-09-23 RX ORDER — ACETAMINOPHEN 650 MG/1
650 SUPPOSITORY RECTAL EVERY 4 HOURS PRN
Status: DISCONTINUED | OUTPATIENT
Start: 2020-09-23 | End: 2020-09-28 | Stop reason: HOSPADM

## 2020-09-23 RX ORDER — ATORVASTATIN CALCIUM 20 MG/1
20 TABLET, FILM COATED ORAL DAILY
Status: DISCONTINUED | OUTPATIENT
Start: 2020-09-23 | End: 2020-09-25

## 2020-09-23 RX ORDER — SODIUM CHLORIDE 0.9 % (FLUSH) 0.9 %
10 SYRINGE (ML) INJECTION AS NEEDED
Status: DISCONTINUED | OUTPATIENT
Start: 2020-09-23 | End: 2020-09-28 | Stop reason: HOSPADM

## 2020-09-23 RX ORDER — DEXTROSE MONOHYDRATE 25 G/50ML
25 INJECTION, SOLUTION INTRAVENOUS
Status: DISCONTINUED | OUTPATIENT
Start: 2020-09-23 | End: 2020-09-28 | Stop reason: HOSPADM

## 2020-09-23 RX ORDER — NICOTINE POLACRILEX 4 MG
15 LOZENGE BUCCAL
Status: DISCONTINUED | OUTPATIENT
Start: 2020-09-23 | End: 2020-09-28 | Stop reason: HOSPADM

## 2020-09-23 RX ORDER — HYDRALAZINE HYDROCHLORIDE 50 MG/1
50 TABLET, FILM COATED ORAL 4 TIMES DAILY
Status: DISCONTINUED | OUTPATIENT
Start: 2020-09-23 | End: 2020-09-28 | Stop reason: HOSPADM

## 2020-09-23 RX ORDER — INSULIN GLARGINE 100 [IU]/ML
55 INJECTION, SOLUTION SUBCUTANEOUS NIGHTLY
Status: DISCONTINUED | OUTPATIENT
Start: 2020-09-23 | End: 2020-09-27

## 2020-09-23 RX ORDER — ACETAMINOPHEN 325 MG/1
650 TABLET ORAL EVERY 4 HOURS PRN
Status: DISCONTINUED | OUTPATIENT
Start: 2020-09-23 | End: 2020-09-28 | Stop reason: HOSPADM

## 2020-09-23 RX ORDER — SODIUM CHLORIDE 9 MG/ML
75 INJECTION, SOLUTION INTRAVENOUS CONTINUOUS
Status: DISCONTINUED | OUTPATIENT
Start: 2020-09-23 | End: 2020-09-25

## 2020-09-23 RX ORDER — HYDROCODONE BITARTRATE AND ACETAMINOPHEN 5; 325 MG/1; MG/1
1 TABLET ORAL EVERY 6 HOURS PRN
Status: DISCONTINUED | OUTPATIENT
Start: 2020-09-23 | End: 2020-09-28 | Stop reason: HOSPADM

## 2020-09-23 RX ORDER — ASPIRIN 81 MG/1
81 TABLET ORAL DAILY
Status: DISCONTINUED | OUTPATIENT
Start: 2020-09-23 | End: 2020-09-28 | Stop reason: HOSPADM

## 2020-09-23 RX ORDER — FAMOTIDINE 20 MG/1
20 TABLET, FILM COATED ORAL 2 TIMES DAILY
Status: DISCONTINUED | OUTPATIENT
Start: 2020-09-23 | End: 2020-09-24

## 2020-09-23 RX ORDER — CEFTRIAXONE SODIUM 1 G/50ML
1 INJECTION, SOLUTION INTRAVENOUS EVERY 24 HOURS
Status: DISCONTINUED | OUTPATIENT
Start: 2020-09-23 | End: 2020-09-25

## 2020-09-23 RX ORDER — AMLODIPINE BESYLATE 10 MG/1
10 TABLET ORAL DAILY
Status: DISCONTINUED | OUTPATIENT
Start: 2020-09-23 | End: 2020-09-28 | Stop reason: HOSPADM

## 2020-09-23 RX ORDER — LORAZEPAM 2 MG/ML
INJECTION INTRAMUSCULAR
Status: DISPENSED
Start: 2020-09-23 | End: 2020-09-23

## 2020-09-23 RX ORDER — ALLOPURINOL 100 MG/1
200 TABLET ORAL DAILY
Status: DISCONTINUED | OUTPATIENT
Start: 2020-09-23 | End: 2020-09-28

## 2020-09-23 RX ORDER — NITROGLYCERIN 0.4 MG/1
0.4 TABLET SUBLINGUAL
Status: DISCONTINUED | OUTPATIENT
Start: 2020-09-23 | End: 2020-09-28 | Stop reason: HOSPADM

## 2020-09-23 RX ORDER — SODIUM CHLORIDE 0.9 % (FLUSH) 0.9 %
10 SYRINGE (ML) INJECTION EVERY 12 HOURS SCHEDULED
Status: DISCONTINUED | OUTPATIENT
Start: 2020-09-23 | End: 2020-09-28 | Stop reason: HOSPADM

## 2020-09-23 RX ADMIN — HYDRALAZINE HYDROCHLORIDE 50 MG: 50 TABLET, FILM COATED ORAL at 11:57

## 2020-09-23 RX ADMIN — HYDRALAZINE HYDROCHLORIDE 50 MG: 50 TABLET, FILM COATED ORAL at 20:01

## 2020-09-23 RX ADMIN — ATORVASTATIN CALCIUM 20 MG: 20 TABLET, FILM COATED ORAL at 08:11

## 2020-09-23 RX ADMIN — CEFTRIAXONE SODIUM 1 G: 1 INJECTION, SOLUTION INTRAVENOUS at 21:12

## 2020-09-23 RX ADMIN — INSULIN LISPRO 4 UNITS: 100 INJECTION, SOLUTION INTRAVENOUS; SUBCUTANEOUS at 07:56

## 2020-09-23 RX ADMIN — SODIUM CHLORIDE, PRESERVATIVE FREE 10 ML: 5 INJECTION INTRAVENOUS at 02:03

## 2020-09-23 RX ADMIN — ASPIRIN 81 MG: 81 TABLET, COATED ORAL at 08:11

## 2020-09-23 RX ADMIN — HYDROCODONE BITARTRATE AND ACETAMINOPHEN 1 TABLET: 5; 325 TABLET ORAL at 22:46

## 2020-09-23 RX ADMIN — SODIUM CHLORIDE 75 ML/HR: 9 INJECTION, SOLUTION INTRAVENOUS at 16:10

## 2020-09-23 RX ADMIN — SODIUM CHLORIDE, PRESERVATIVE FREE 10 ML: 5 INJECTION INTRAVENOUS at 08:11

## 2020-09-23 RX ADMIN — ACETAMINOPHEN 650 MG: 325 TABLET, FILM COATED ORAL at 19:57

## 2020-09-23 RX ADMIN — RIVAROXABAN 20 MG: 20 TABLET, FILM COATED ORAL at 18:11

## 2020-09-23 RX ADMIN — ACETAMINOPHEN 650 MG: 325 TABLET, FILM COATED ORAL at 00:58

## 2020-09-23 RX ADMIN — ALLOPURINOL 200 MG: 100 TABLET ORAL at 08:11

## 2020-09-23 RX ADMIN — SODIUM CHLORIDE 100 ML/HR: 9 INJECTION, SOLUTION INTRAVENOUS at 02:15

## 2020-09-23 RX ADMIN — INSULIN GLARGINE 55 UNITS: 100 INJECTION, SOLUTION SUBCUTANEOUS at 21:08

## 2020-09-23 RX ADMIN — FAMOTIDINE 20 MG: 20 TABLET, FILM COATED ORAL at 19:57

## 2020-09-23 RX ADMIN — INSULIN LISPRO 2 UNITS: 100 INJECTION, SOLUTION INTRAVENOUS; SUBCUTANEOUS at 18:10

## 2020-09-23 RX ADMIN — HYDRALAZINE HYDROCHLORIDE 50 MG: 50 TABLET, FILM COATED ORAL at 18:11

## 2020-09-23 RX ADMIN — ACETAMINOPHEN 650 MG: 325 TABLET, FILM COATED ORAL at 10:47

## 2020-09-23 RX ADMIN — AZITHROMYCIN MONOHYDRATE 500 MG: 500 INJECTION, POWDER, LYOPHILIZED, FOR SOLUTION INTRAVENOUS at 00:27

## 2020-09-23 RX ADMIN — HYDRALAZINE HYDROCHLORIDE 50 MG: 50 TABLET, FILM COATED ORAL at 07:48

## 2020-09-23 RX ADMIN — AMLODIPINE BESYLATE 10 MG: 10 TABLET ORAL at 08:11

## 2020-09-23 RX ADMIN — FAMOTIDINE 20 MG: 20 TABLET, FILM COATED ORAL at 08:11

## 2020-09-23 RX ADMIN — INSULIN LISPRO 4 UNITS: 100 INJECTION, SOLUTION INTRAVENOUS; SUBCUTANEOUS at 11:57

## 2020-09-24 ENCOUNTER — APPOINTMENT (OUTPATIENT)
Dept: GENERAL RADIOLOGY | Facility: HOSPITAL | Age: 70
End: 2020-09-24

## 2020-09-24 PROBLEM — M25.569 KNEE PAIN: Status: ACTIVE | Noted: 2017-05-05

## 2020-09-24 LAB
ANION GAP SERPL CALCULATED.3IONS-SCNC: 11.2 MMOL/L (ref 5–15)
APPEARANCE FLD: ABNORMAL
APPEARANCE FLD: ABNORMAL
BUN SERPL-MCNC: 24 MG/DL (ref 8–23)
BUN/CREAT SERPL: 13.1 (ref 7–25)
CALCIUM SPEC-SCNC: 9 MG/DL (ref 8.6–10.5)
CHLORIDE SERPL-SCNC: 99 MMOL/L (ref 98–107)
CHLORIDE UR-SCNC: 45 MMOL/L
CO2 SERPL-SCNC: 24.8 MMOL/L (ref 22–29)
COLOR FLD: ABNORMAL
COLOR FLD: ABNORMAL
CREAT SERPL-MCNC: 1.83 MG/DL (ref 0.76–1.27)
CREAT UR-MCNC: 83.1 MG/DL
CRYSTALS FLD MICRO: NORMAL
CRYSTALS FLD MICRO: NORMAL
GFR SERPL CREATININE-BSD FRML MDRD: 37 ML/MIN/1.73
GLUCOSE BLDC GLUCOMTR-MCNC: 199 MG/DL (ref 70–130)
GLUCOSE BLDC GLUCOMTR-MCNC: 273 MG/DL (ref 70–130)
GLUCOSE BLDC GLUCOMTR-MCNC: 273 MG/DL (ref 70–130)
GLUCOSE BLDC GLUCOMTR-MCNC: 328 MG/DL (ref 70–130)
GLUCOSE BLDC GLUCOMTR-MCNC: 357 MG/DL (ref 70–130)
GLUCOSE SERPL-MCNC: 232 MG/DL (ref 65–99)
LYMPHOCYTES NFR FLD MANUAL: 4 %
LYMPHOCYTES NFR FLD MANUAL: 5 %
METHOD: ABNORMAL
METHOD: ABNORMAL
MONOCYTES NFR FLD: 4 %
MONOCYTES NFR FLD: 7 %
NEUTROPHILS NFR FLD MANUAL: 89 %
NEUTROPHILS NFR FLD MANUAL: 91 %
NUC CELL # FLD: ABNORMAL /MM3
NUC CELL # FLD: ABNORMAL /MM3
POTASSIUM SERPL-SCNC: 4 MMOL/L (ref 3.5–5.2)
PROT UR-MCNC: 87.2 MG/DL
RBC # FLD AUTO: ABNORMAL /MM3
RBC # FLD AUTO: ABNORMAL /MM3
SODIUM SERPL-SCNC: 135 MMOL/L (ref 136–145)

## 2020-09-24 PROCEDURE — 87070 CULTURE OTHR SPECIMN AEROBIC: CPT | Performed by: STUDENT IN AN ORGANIZED HEALTH CARE EDUCATION/TRAINING PROGRAM

## 2020-09-24 PROCEDURE — 73560 X-RAY EXAM OF KNEE 1 OR 2: CPT

## 2020-09-24 PROCEDURE — 97110 THERAPEUTIC EXERCISES: CPT

## 2020-09-24 PROCEDURE — 82436 ASSAY OF URINE CHLORIDE: CPT | Performed by: INTERNAL MEDICINE

## 2020-09-24 PROCEDURE — 89051 BODY FLUID CELL COUNT: CPT | Performed by: STUDENT IN AN ORGANIZED HEALTH CARE EDUCATION/TRAINING PROGRAM

## 2020-09-24 PROCEDURE — 63710000001 INSULIN LISPRO (HUMAN) PER 5 UNITS: Performed by: NURSE PRACTITIONER

## 2020-09-24 PROCEDURE — 97162 PT EVAL MOD COMPLEX 30 MIN: CPT

## 2020-09-24 PROCEDURE — 82570 ASSAY OF URINE CREATININE: CPT | Performed by: INTERNAL MEDICINE

## 2020-09-24 PROCEDURE — 82962 GLUCOSE BLOOD TEST: CPT

## 2020-09-24 PROCEDURE — 25010000003 LIDOCAINE 1 % SOLUTION: Performed by: STUDENT IN AN ORGANIZED HEALTH CARE EDUCATION/TRAINING PROGRAM

## 2020-09-24 PROCEDURE — 87205 SMEAR GRAM STAIN: CPT | Performed by: STUDENT IN AN ORGANIZED HEALTH CARE EDUCATION/TRAINING PROGRAM

## 2020-09-24 PROCEDURE — 80048 BASIC METABOLIC PNL TOTAL CA: CPT | Performed by: INTERNAL MEDICINE

## 2020-09-24 PROCEDURE — 25010000002 CEFTRIAXONE PER 250 MG: Performed by: NURSE PRACTITIONER

## 2020-09-24 PROCEDURE — 89060 EXAM SYNOVIAL FLUID CRYSTALS: CPT | Performed by: STUDENT IN AN ORGANIZED HEALTH CARE EDUCATION/TRAINING PROGRAM

## 2020-09-24 PROCEDURE — 84156 ASSAY OF PROTEIN URINE: CPT | Performed by: INTERNAL MEDICINE

## 2020-09-24 PROCEDURE — 0S9D3ZX DRAINAGE OF LEFT KNEE JOINT, PERCUTANEOUS APPROACH, DIAGNOSTIC: ICD-10-PCS | Performed by: STUDENT IN AN ORGANIZED HEALTH CARE EDUCATION/TRAINING PROGRAM

## 2020-09-24 PROCEDURE — 63710000001 INSULIN GLARGINE PER 5 UNITS: Performed by: NURSE PRACTITIONER

## 2020-09-24 RX ORDER — AZITHROMYCIN 250 MG/1
500 TABLET, FILM COATED ORAL
Status: DISCONTINUED | OUTPATIENT
Start: 2020-09-24 | End: 2020-09-25

## 2020-09-24 RX ORDER — LIDOCAINE HYDROCHLORIDE 10 MG/ML
30 INJECTION, SOLUTION INFILTRATION; PERINEURAL ONCE
Status: COMPLETED | OUTPATIENT
Start: 2020-09-24 | End: 2020-09-24

## 2020-09-24 RX ORDER — FAMOTIDINE 20 MG/1
20 TABLET, FILM COATED ORAL DAILY
Status: DISCONTINUED | OUTPATIENT
Start: 2020-09-24 | End: 2020-09-26

## 2020-09-24 RX ADMIN — INSULIN LISPRO 4 UNITS: 100 INJECTION, SOLUTION INTRAVENOUS; SUBCUTANEOUS at 12:11

## 2020-09-24 RX ADMIN — SODIUM CHLORIDE 75 ML/HR: 9 INJECTION, SOLUTION INTRAVENOUS at 09:04

## 2020-09-24 RX ADMIN — ACETAMINOPHEN 650 MG: 325 TABLET, FILM COATED ORAL at 15:09

## 2020-09-24 RX ADMIN — INSULIN LISPRO 2 UNITS: 100 INJECTION, SOLUTION INTRAVENOUS; SUBCUTANEOUS at 09:00

## 2020-09-24 RX ADMIN — INSULIN GLARGINE 55 UNITS: 100 INJECTION, SOLUTION SUBCUTANEOUS at 21:33

## 2020-09-24 RX ADMIN — ATORVASTATIN CALCIUM 20 MG: 20 TABLET, FILM COATED ORAL at 09:00

## 2020-09-24 RX ADMIN — RIVAROXABAN 20 MG: 20 TABLET, FILM COATED ORAL at 17:22

## 2020-09-24 RX ADMIN — INSULIN LISPRO 6 UNITS: 100 INJECTION, SOLUTION INTRAVENOUS; SUBCUTANEOUS at 17:22

## 2020-09-24 RX ADMIN — ACETAMINOPHEN 650 MG: 325 TABLET, FILM COATED ORAL at 09:00

## 2020-09-24 RX ADMIN — SODIUM CHLORIDE, PRESERVATIVE FREE 10 ML: 5 INJECTION INTRAVENOUS at 09:00

## 2020-09-24 RX ADMIN — ALLOPURINOL 200 MG: 100 TABLET ORAL at 08:59

## 2020-09-24 RX ADMIN — HYDRALAZINE HYDROCHLORIDE 50 MG: 50 TABLET, FILM COATED ORAL at 08:59

## 2020-09-24 RX ADMIN — HYDRALAZINE HYDROCHLORIDE 50 MG: 50 TABLET, FILM COATED ORAL at 12:11

## 2020-09-24 RX ADMIN — HYDRALAZINE HYDROCHLORIDE 50 MG: 50 TABLET, FILM COATED ORAL at 21:34

## 2020-09-24 RX ADMIN — FAMOTIDINE 20 MG: 20 TABLET, FILM COATED ORAL at 09:00

## 2020-09-24 RX ADMIN — AMLODIPINE BESYLATE 10 MG: 10 TABLET ORAL at 09:00

## 2020-09-24 RX ADMIN — CEFTRIAXONE SODIUM 1 G: 1 INJECTION, SOLUTION INTRAVENOUS at 21:35

## 2020-09-24 RX ADMIN — ASPIRIN 81 MG: 81 TABLET, COATED ORAL at 09:00

## 2020-09-24 RX ADMIN — SODIUM CHLORIDE, PRESERVATIVE FREE 10 ML: 5 INJECTION INTRAVENOUS at 21:35

## 2020-09-24 RX ADMIN — SODIUM CHLORIDE 75 ML/HR: 9 INJECTION, SOLUTION INTRAVENOUS at 22:16

## 2020-09-24 RX ADMIN — LIDOCAINE HYDROCHLORIDE 20 ML: 10 INJECTION, SOLUTION INFILTRATION; PERINEURAL at 15:32

## 2020-09-24 RX ADMIN — AZITHROMYCIN DIHYDRATE 500 MG: 250 TABLET, FILM COATED ORAL at 21:34

## 2020-09-24 RX ADMIN — HYDRALAZINE HYDROCHLORIDE 50 MG: 50 TABLET, FILM COATED ORAL at 17:22

## 2020-09-25 PROBLEM — M10.9 ACUTE GOUT OF KNEE: Status: ACTIVE | Noted: 2020-09-25

## 2020-09-25 PROBLEM — M11.262 PSEUDOGOUT OF KNEE, LEFT: Status: ACTIVE | Noted: 2020-09-25

## 2020-09-25 LAB
ALBUMIN SERPL-MCNC: 3 G/DL (ref 3.5–5.2)
ANION GAP SERPL CALCULATED.3IONS-SCNC: 10 MMOL/L (ref 5–15)
BASOPHILS # BLD AUTO: 0.04 10*3/MM3 (ref 0–0.2)
BASOPHILS NFR BLD AUTO: 0.4 % (ref 0–1.5)
BUN SERPL-MCNC: 28 MG/DL (ref 8–23)
BUN/CREAT SERPL: 19.2 (ref 7–25)
CALCIUM SPEC-SCNC: 8.8 MG/DL (ref 8.6–10.5)
CHLORIDE SERPL-SCNC: 101 MMOL/L (ref 98–107)
CO2 SERPL-SCNC: 24 MMOL/L (ref 22–29)
CREAT SERPL-MCNC: 1.46 MG/DL (ref 0.76–1.27)
DEPRECATED RDW RBC AUTO: 41.5 FL (ref 37–54)
EOSINOPHIL # BLD AUTO: 0.11 10*3/MM3 (ref 0–0.4)
EOSINOPHIL NFR BLD AUTO: 1 % (ref 0.3–6.2)
ERYTHROCYTE [DISTWIDTH] IN BLOOD BY AUTOMATED COUNT: 14 % (ref 12.3–15.4)
GFR SERPL CREATININE-BSD FRML MDRD: 48 ML/MIN/1.73
GLUCOSE BLDC GLUCOMTR-MCNC: 115 MG/DL (ref 70–130)
GLUCOSE BLDC GLUCOMTR-MCNC: 235 MG/DL (ref 70–130)
GLUCOSE BLDC GLUCOMTR-MCNC: 267 MG/DL (ref 70–130)
GLUCOSE BLDC GLUCOMTR-MCNC: 428 MG/DL (ref 70–130)
GLUCOSE BLDC GLUCOMTR-MCNC: 433 MG/DL (ref 70–130)
GLUCOSE SERPL-MCNC: 106 MG/DL (ref 65–99)
HCT VFR BLD AUTO: 29.3 % (ref 37.5–51)
HGB BLD-MCNC: 9.6 G/DL (ref 13–17.7)
IMM GRANULOCYTES # BLD AUTO: 0.07 10*3/MM3 (ref 0–0.05)
IMM GRANULOCYTES NFR BLD AUTO: 0.6 % (ref 0–0.5)
LYMPHOCYTES # BLD AUTO: 0.61 10*3/MM3 (ref 0.7–3.1)
LYMPHOCYTES NFR BLD AUTO: 5.6 % (ref 19.6–45.3)
MCH RBC QN AUTO: 26.5 PG (ref 26.6–33)
MCHC RBC AUTO-ENTMCNC: 32.8 G/DL (ref 31.5–35.7)
MCV RBC AUTO: 80.9 FL (ref 79–97)
MONOCYTES # BLD AUTO: 0.79 10*3/MM3 (ref 0.1–0.9)
MONOCYTES NFR BLD AUTO: 7.3 % (ref 5–12)
NEUTROPHILS NFR BLD AUTO: 85.1 % (ref 42.7–76)
NEUTROPHILS NFR BLD AUTO: 9.21 10*3/MM3 (ref 1.7–7)
NRBC BLD AUTO-RTO: 0 /100 WBC (ref 0–0.2)
PHOSPHATE SERPL-MCNC: 3.4 MG/DL (ref 2.5–4.5)
PLATELET # BLD AUTO: 209 10*3/MM3 (ref 140–450)
PMV BLD AUTO: 9.1 FL (ref 6–12)
POTASSIUM SERPL-SCNC: 4.1 MMOL/L (ref 3.5–5.2)
RBC # BLD AUTO: 3.62 10*6/MM3 (ref 4.14–5.8)
SODIUM SERPL-SCNC: 135 MMOL/L (ref 136–145)
URATE SERPL-MCNC: 5.1 MG/DL (ref 3.4–7)
WBC # BLD AUTO: 10.83 10*3/MM3 (ref 3.4–10.8)

## 2020-09-25 PROCEDURE — 84550 ASSAY OF BLOOD/URIC ACID: CPT | Performed by: INTERNAL MEDICINE

## 2020-09-25 PROCEDURE — 82962 GLUCOSE BLOOD TEST: CPT

## 2020-09-25 PROCEDURE — 97110 THERAPEUTIC EXERCISES: CPT

## 2020-09-25 PROCEDURE — 97530 THERAPEUTIC ACTIVITIES: CPT

## 2020-09-25 PROCEDURE — 85025 COMPLETE CBC W/AUTO DIFF WBC: CPT | Performed by: HOSPITALIST

## 2020-09-25 PROCEDURE — 63710000001 INSULIN LISPRO (HUMAN) PER 5 UNITS: Performed by: NURSE PRACTITIONER

## 2020-09-25 PROCEDURE — 63710000001 INSULIN GLARGINE PER 5 UNITS: Performed by: NURSE PRACTITIONER

## 2020-09-25 PROCEDURE — 80069 RENAL FUNCTION PANEL: CPT | Performed by: INTERNAL MEDICINE

## 2020-09-25 PROCEDURE — 63710000001 PREDNISONE PER 1 MG: Performed by: INTERNAL MEDICINE

## 2020-09-25 RX ORDER — COLCHICINE 0.6 MG/1
0.6 TABLET ORAL DAILY
Status: DISCONTINUED | OUTPATIENT
Start: 2020-09-25 | End: 2020-09-28 | Stop reason: HOSPADM

## 2020-09-25 RX ORDER — BUMETANIDE 1 MG/1
1 TABLET ORAL 2 TIMES DAILY
Status: DISCONTINUED | OUTPATIENT
Start: 2020-09-25 | End: 2020-09-27

## 2020-09-25 RX ORDER — PREDNISONE 20 MG/1
40 TABLET ORAL
Status: DISCONTINUED | OUTPATIENT
Start: 2020-09-25 | End: 2020-09-28

## 2020-09-25 RX ORDER — POLYETHYLENE GLYCOL 3350 17 G/17G
17 POWDER, FOR SOLUTION ORAL DAILY
Status: DISCONTINUED | OUTPATIENT
Start: 2020-09-25 | End: 2020-09-28 | Stop reason: HOSPADM

## 2020-09-25 RX ORDER — COLCHICINE 0.6 MG/1
0.6 TABLET ORAL EVERY 12 HOURS SCHEDULED
Status: DISCONTINUED | OUTPATIENT
Start: 2020-09-25 | End: 2020-09-25

## 2020-09-25 RX ADMIN — ACETAMINOPHEN 650 MG: 325 TABLET, FILM COATED ORAL at 08:54

## 2020-09-25 RX ADMIN — AMLODIPINE BESYLATE 10 MG: 10 TABLET ORAL at 08:54

## 2020-09-25 RX ADMIN — COLCHICINE 0.6 MG: 0.6 TABLET ORAL at 13:32

## 2020-09-25 RX ADMIN — HYDRALAZINE HYDROCHLORIDE 50 MG: 50 TABLET, FILM COATED ORAL at 12:06

## 2020-09-25 RX ADMIN — SODIUM CHLORIDE, PRESERVATIVE FREE 10 ML: 5 INJECTION INTRAVENOUS at 08:54

## 2020-09-25 RX ADMIN — SODIUM CHLORIDE, PRESERVATIVE FREE 10 ML: 5 INJECTION INTRAVENOUS at 20:00

## 2020-09-25 RX ADMIN — HYDRALAZINE HYDROCHLORIDE 50 MG: 50 TABLET, FILM COATED ORAL at 08:54

## 2020-09-25 RX ADMIN — BUMETANIDE 1 MG: 1 TABLET ORAL at 13:32

## 2020-09-25 RX ADMIN — FAMOTIDINE 20 MG: 20 TABLET, FILM COATED ORAL at 08:58

## 2020-09-25 RX ADMIN — INSULIN LISPRO 7 UNITS: 100 INJECTION, SOLUTION INTRAVENOUS; SUBCUTANEOUS at 21:01

## 2020-09-25 RX ADMIN — PREDNISONE 40 MG: 20 TABLET ORAL at 13:32

## 2020-09-25 RX ADMIN — ALLOPURINOL 200 MG: 100 TABLET ORAL at 08:54

## 2020-09-25 RX ADMIN — RIVAROXABAN 20 MG: 20 TABLET, FILM COATED ORAL at 17:31

## 2020-09-25 RX ADMIN — HYDRALAZINE HYDROCHLORIDE 50 MG: 50 TABLET, FILM COATED ORAL at 23:36

## 2020-09-25 RX ADMIN — INSULIN LISPRO 3 UNITS: 100 INJECTION, SOLUTION INTRAVENOUS; SUBCUTANEOUS at 12:06

## 2020-09-25 RX ADMIN — ATORVASTATIN CALCIUM 20 MG: 20 TABLET, FILM COATED ORAL at 08:54

## 2020-09-25 RX ADMIN — INSULIN LISPRO 4 UNITS: 100 INJECTION, SOLUTION INTRAVENOUS; SUBCUTANEOUS at 00:33

## 2020-09-25 RX ADMIN — ASPIRIN 81 MG: 81 TABLET, COATED ORAL at 08:54

## 2020-09-25 RX ADMIN — BUMETANIDE 1 MG: 1 TABLET ORAL at 20:00

## 2020-09-25 RX ADMIN — HYDRALAZINE HYDROCHLORIDE 50 MG: 50 TABLET, FILM COATED ORAL at 17:31

## 2020-09-25 RX ADMIN — INSULIN LISPRO 4 UNITS: 100 INJECTION, SOLUTION INTRAVENOUS; SUBCUTANEOUS at 17:31

## 2020-09-25 RX ADMIN — INSULIN GLARGINE 55 UNITS: 100 INJECTION, SOLUTION SUBCUTANEOUS at 21:01

## 2020-09-25 RX ADMIN — POLYETHYLENE GLYCOL 3350 17 G: 17 POWDER, FOR SOLUTION ORAL at 14:56

## 2020-09-25 RX ADMIN — ACETAMINOPHEN 650 MG: 325 TABLET, FILM COATED ORAL at 17:31

## 2020-09-26 LAB
ALBUMIN SERPL-MCNC: 2.8 G/DL (ref 3.5–5.2)
ANION GAP SERPL CALCULATED.3IONS-SCNC: 10.5 MMOL/L (ref 5–15)
BASOPHILS # BLD AUTO: 0.01 10*3/MM3 (ref 0–0.2)
BASOPHILS NFR BLD AUTO: 0.1 % (ref 0–1.5)
BUN SERPL-MCNC: 35 MG/DL (ref 8–23)
BUN/CREAT SERPL: 22.9 (ref 7–25)
CALCIUM SPEC-SCNC: 9.2 MG/DL (ref 8.6–10.5)
CHLORIDE SERPL-SCNC: 99 MMOL/L (ref 98–107)
CO2 SERPL-SCNC: 24.5 MMOL/L (ref 22–29)
CREAT SERPL-MCNC: 1.53 MG/DL (ref 0.76–1.27)
DEPRECATED RDW RBC AUTO: 41.9 FL (ref 37–54)
EOSINOPHIL # BLD AUTO: 0 10*3/MM3 (ref 0–0.4)
EOSINOPHIL NFR BLD AUTO: 0 % (ref 0.3–6.2)
ERYTHROCYTE [DISTWIDTH] IN BLOOD BY AUTOMATED COUNT: 13.7 % (ref 12.3–15.4)
GFR SERPL CREATININE-BSD FRML MDRD: 45 ML/MIN/1.73
GLUCOSE BLDC GLUCOMTR-MCNC: 274 MG/DL (ref 70–130)
GLUCOSE BLDC GLUCOMTR-MCNC: 329 MG/DL (ref 70–130)
GLUCOSE BLDC GLUCOMTR-MCNC: 358 MG/DL (ref 70–130)
GLUCOSE BLDC GLUCOMTR-MCNC: 415 MG/DL (ref 70–130)
GLUCOSE BLDC GLUCOMTR-MCNC: 433 MG/DL (ref 70–130)
GLUCOSE BLDC GLUCOMTR-MCNC: 447 MG/DL (ref 70–130)
GLUCOSE SERPL-MCNC: 310 MG/DL (ref 65–99)
HCT VFR BLD AUTO: 31.1 % (ref 37.5–51)
HGB BLD-MCNC: 9.9 G/DL (ref 13–17.7)
IMM GRANULOCYTES # BLD AUTO: 0.03 10*3/MM3 (ref 0–0.05)
IMM GRANULOCYTES NFR BLD AUTO: 0.4 % (ref 0–0.5)
LYMPHOCYTES # BLD AUTO: 0.45 10*3/MM3 (ref 0.7–3.1)
LYMPHOCYTES NFR BLD AUTO: 5.7 % (ref 19.6–45.3)
MCH RBC QN AUTO: 26.5 PG (ref 26.6–33)
MCHC RBC AUTO-ENTMCNC: 31.8 G/DL (ref 31.5–35.7)
MCV RBC AUTO: 83.2 FL (ref 79–97)
MONOCYTES # BLD AUTO: 0.51 10*3/MM3 (ref 0.1–0.9)
MONOCYTES NFR BLD AUTO: 6.4 % (ref 5–12)
NEUTROPHILS NFR BLD AUTO: 6.96 10*3/MM3 (ref 1.7–7)
NEUTROPHILS NFR BLD AUTO: 87.4 % (ref 42.7–76)
NRBC BLD AUTO-RTO: 0 /100 WBC (ref 0–0.2)
PHOSPHATE SERPL-MCNC: 5 MG/DL (ref 2.5–4.5)
PLATELET # BLD AUTO: 198 10*3/MM3 (ref 140–450)
PMV BLD AUTO: 9.2 FL (ref 6–12)
POTASSIUM SERPL-SCNC: 4.4 MMOL/L (ref 3.5–5.2)
RBC # BLD AUTO: 3.74 10*6/MM3 (ref 4.14–5.8)
SODIUM SERPL-SCNC: 134 MMOL/L (ref 136–145)
WBC # BLD AUTO: 7.96 10*3/MM3 (ref 3.4–10.8)

## 2020-09-26 PROCEDURE — 63710000001 INSULIN LISPRO (HUMAN) PER 5 UNITS: Performed by: HOSPITALIST

## 2020-09-26 PROCEDURE — 63710000001 INSULIN GLARGINE PER 5 UNITS: Performed by: NURSE PRACTITIONER

## 2020-09-26 PROCEDURE — 80069 RENAL FUNCTION PANEL: CPT | Performed by: INTERNAL MEDICINE

## 2020-09-26 PROCEDURE — 97530 THERAPEUTIC ACTIVITIES: CPT

## 2020-09-26 PROCEDURE — 63710000001 INSULIN LISPRO (HUMAN) PER 5 UNITS: Performed by: NURSE PRACTITIONER

## 2020-09-26 PROCEDURE — 63710000001 PREDNISONE PER 1 MG: Performed by: INTERNAL MEDICINE

## 2020-09-26 PROCEDURE — 85025 COMPLETE CBC W/AUTO DIFF WBC: CPT | Performed by: HOSPITALIST

## 2020-09-26 PROCEDURE — 82962 GLUCOSE BLOOD TEST: CPT

## 2020-09-26 RX ORDER — FAMOTIDINE 20 MG/1
20 TABLET, FILM COATED ORAL
Status: DISCONTINUED | OUTPATIENT
Start: 2020-09-26 | End: 2020-09-28 | Stop reason: HOSPADM

## 2020-09-26 RX ADMIN — INSULIN LISPRO 24 UNITS: 100 INJECTION, SOLUTION INTRAVENOUS; SUBCUTANEOUS at 17:26

## 2020-09-26 RX ADMIN — BUMETANIDE 1 MG: 1 TABLET ORAL at 09:29

## 2020-09-26 RX ADMIN — ASPIRIN 81 MG: 81 TABLET, COATED ORAL at 09:28

## 2020-09-26 RX ADMIN — BUMETANIDE 1 MG: 1 TABLET ORAL at 20:12

## 2020-09-26 RX ADMIN — RIVAROXABAN 20 MG: 20 TABLET, FILM COATED ORAL at 17:19

## 2020-09-26 RX ADMIN — FAMOTIDINE 20 MG: 20 TABLET, FILM COATED ORAL at 17:19

## 2020-09-26 RX ADMIN — HYDRALAZINE HYDROCHLORIDE 50 MG: 50 TABLET, FILM COATED ORAL at 17:19

## 2020-09-26 RX ADMIN — PREDNISONE 40 MG: 20 TABLET ORAL at 07:44

## 2020-09-26 RX ADMIN — SODIUM CHLORIDE, PRESERVATIVE FREE 10 ML: 5 INJECTION INTRAVENOUS at 20:13

## 2020-09-26 RX ADMIN — HYDRALAZINE HYDROCHLORIDE 50 MG: 50 TABLET, FILM COATED ORAL at 11:18

## 2020-09-26 RX ADMIN — INSULIN GLARGINE 55 UNITS: 100 INJECTION, SOLUTION SUBCUTANEOUS at 21:55

## 2020-09-26 RX ADMIN — SODIUM CHLORIDE, PRESERVATIVE FREE 10 ML: 5 INJECTION INTRAVENOUS at 09:30

## 2020-09-26 RX ADMIN — INSULIN LISPRO 5 UNITS: 100 INJECTION, SOLUTION INTRAVENOUS; SUBCUTANEOUS at 11:18

## 2020-09-26 RX ADMIN — FAMOTIDINE 20 MG: 20 TABLET, FILM COATED ORAL at 09:38

## 2020-09-26 RX ADMIN — HYDRALAZINE HYDROCHLORIDE 50 MG: 50 TABLET, FILM COATED ORAL at 07:44

## 2020-09-26 RX ADMIN — HYDRALAZINE HYDROCHLORIDE 50 MG: 50 TABLET, FILM COATED ORAL at 23:54

## 2020-09-26 RX ADMIN — COLCHICINE 0.6 MG: 0.6 TABLET ORAL at 09:28

## 2020-09-26 RX ADMIN — INSULIN LISPRO 4 UNITS: 100 INJECTION, SOLUTION INTRAVENOUS; SUBCUTANEOUS at 07:44

## 2020-09-26 RX ADMIN — ALLOPURINOL 200 MG: 100 TABLET ORAL at 09:29

## 2020-09-26 RX ADMIN — AMLODIPINE BESYLATE 10 MG: 10 TABLET ORAL at 09:28

## 2020-09-27 LAB
ALBUMIN SERPL-MCNC: 2.9 G/DL (ref 3.5–5.2)
ANION GAP SERPL CALCULATED.3IONS-SCNC: 11.3 MMOL/L (ref 5–15)
BACTERIA SPEC AEROBE CULT: NORMAL
BACTERIA SPEC AEROBE CULT: NORMAL
BASOPHILS # BLD AUTO: 0.02 10*3/MM3 (ref 0–0.2)
BASOPHILS NFR BLD AUTO: 0.3 % (ref 0–1.5)
BUN SERPL-MCNC: 46 MG/DL (ref 8–23)
BUN/CREAT SERPL: 25.1 (ref 7–25)
CALCIUM SPEC-SCNC: 9.3 MG/DL (ref 8.6–10.5)
CHLORIDE SERPL-SCNC: 98 MMOL/L (ref 98–107)
CO2 SERPL-SCNC: 26.7 MMOL/L (ref 22–29)
CREAT SERPL-MCNC: 1.83 MG/DL (ref 0.76–1.27)
DEPRECATED RDW RBC AUTO: 41.9 FL (ref 37–54)
EOSINOPHIL # BLD AUTO: 0.09 10*3/MM3 (ref 0–0.4)
EOSINOPHIL NFR BLD AUTO: 1.3 % (ref 0.3–6.2)
ERYTHROCYTE [DISTWIDTH] IN BLOOD BY AUTOMATED COUNT: 14 % (ref 12.3–15.4)
GFR SERPL CREATININE-BSD FRML MDRD: 37 ML/MIN/1.73
GLUCOSE BLDC GLUCOMTR-MCNC: 277 MG/DL (ref 70–130)
GLUCOSE BLDC GLUCOMTR-MCNC: 308 MG/DL (ref 70–130)
GLUCOSE BLDC GLUCOMTR-MCNC: 478 MG/DL (ref 70–130)
GLUCOSE BLDC GLUCOMTR-MCNC: 532 MG/DL (ref 70–130)
GLUCOSE BLDC GLUCOMTR-MCNC: 557 MG/DL (ref 70–130)
GLUCOSE BLDC GLUCOMTR-MCNC: 579 MG/DL (ref 70–130)
GLUCOSE SERPL-MCNC: 267 MG/DL (ref 65–99)
HCT VFR BLD AUTO: 30.3 % (ref 37.5–51)
HGB BLD-MCNC: 9.7 G/DL (ref 13–17.7)
IMM GRANULOCYTES # BLD AUTO: 0.02 10*3/MM3 (ref 0–0.05)
IMM GRANULOCYTES NFR BLD AUTO: 0.3 % (ref 0–0.5)
LYMPHOCYTES # BLD AUTO: 1.11 10*3/MM3 (ref 0.7–3.1)
LYMPHOCYTES NFR BLD AUTO: 15.8 % (ref 19.6–45.3)
MCH RBC QN AUTO: 26.4 PG (ref 26.6–33)
MCHC RBC AUTO-ENTMCNC: 32 G/DL (ref 31.5–35.7)
MCV RBC AUTO: 82.6 FL (ref 79–97)
MONOCYTES # BLD AUTO: 0.43 10*3/MM3 (ref 0.1–0.9)
MONOCYTES NFR BLD AUTO: 6.1 % (ref 5–12)
NEUTROPHILS NFR BLD AUTO: 5.36 10*3/MM3 (ref 1.7–7)
NEUTROPHILS NFR BLD AUTO: 76.2 % (ref 42.7–76)
NRBC BLD AUTO-RTO: 0.1 /100 WBC (ref 0–0.2)
PHOSPHATE SERPL-MCNC: 4.4 MG/DL (ref 2.5–4.5)
PLATELET # BLD AUTO: 239 10*3/MM3 (ref 140–450)
PMV BLD AUTO: 9 FL (ref 6–12)
POTASSIUM SERPL-SCNC: 4.4 MMOL/L (ref 3.5–5.2)
RBC # BLD AUTO: 3.67 10*6/MM3 (ref 4.14–5.8)
SODIUM SERPL-SCNC: 136 MMOL/L (ref 136–145)
WBC # BLD AUTO: 7.03 10*3/MM3 (ref 3.4–10.8)

## 2020-09-27 PROCEDURE — 63710000001 INSULIN GLARGINE PER 5 UNITS: Performed by: HOSPITALIST

## 2020-09-27 PROCEDURE — 85025 COMPLETE CBC W/AUTO DIFF WBC: CPT | Performed by: HOSPITALIST

## 2020-09-27 PROCEDURE — 82962 GLUCOSE BLOOD TEST: CPT

## 2020-09-27 PROCEDURE — 63710000001 PREDNISONE PER 1 MG: Performed by: INTERNAL MEDICINE

## 2020-09-27 PROCEDURE — 63710000001 INSULIN LISPRO (HUMAN) PER 5 UNITS: Performed by: HOSPITALIST

## 2020-09-27 PROCEDURE — 80069 RENAL FUNCTION PANEL: CPT | Performed by: INTERNAL MEDICINE

## 2020-09-27 RX ORDER — BUMETANIDE 1 MG/1
1 TABLET ORAL DAILY
Status: DISCONTINUED | OUTPATIENT
Start: 2020-09-28 | End: 2020-09-28 | Stop reason: HOSPADM

## 2020-09-27 RX ORDER — INSULIN GLARGINE 100 [IU]/ML
70 INJECTION, SOLUTION SUBCUTANEOUS NIGHTLY
Status: DISCONTINUED | OUTPATIENT
Start: 2020-09-27 | End: 2020-09-28 | Stop reason: HOSPADM

## 2020-09-27 RX ADMIN — FAMOTIDINE 20 MG: 20 TABLET, FILM COATED ORAL at 07:23

## 2020-09-27 RX ADMIN — ALLOPURINOL 200 MG: 100 TABLET ORAL at 09:25

## 2020-09-27 RX ADMIN — SODIUM CHLORIDE, PRESERVATIVE FREE 10 ML: 5 INJECTION INTRAVENOUS at 21:36

## 2020-09-27 RX ADMIN — HYDRALAZINE HYDROCHLORIDE 50 MG: 50 TABLET, FILM COATED ORAL at 11:42

## 2020-09-27 RX ADMIN — ACETAMINOPHEN 650 MG: 325 TABLET, FILM COATED ORAL at 07:27

## 2020-09-27 RX ADMIN — HYDRALAZINE HYDROCHLORIDE 50 MG: 50 TABLET, FILM COATED ORAL at 21:36

## 2020-09-27 RX ADMIN — INSULIN LISPRO 16 UNITS: 100 INJECTION, SOLUTION INTRAVENOUS; SUBCUTANEOUS at 11:42

## 2020-09-27 RX ADMIN — HYDRALAZINE HYDROCHLORIDE 50 MG: 50 TABLET, FILM COATED ORAL at 07:23

## 2020-09-27 RX ADMIN — INSULIN LISPRO 12 UNITS: 100 INJECTION, SOLUTION INTRAVENOUS; SUBCUTANEOUS at 07:23

## 2020-09-27 RX ADMIN — INSULIN GLARGINE 70 UNITS: 100 INJECTION, SOLUTION SUBCUTANEOUS at 21:36

## 2020-09-27 RX ADMIN — BUMETANIDE 1 MG: 1 TABLET ORAL at 09:25

## 2020-09-27 RX ADMIN — INSULIN LISPRO 24 UNITS: 100 INJECTION, SOLUTION INTRAVENOUS; SUBCUTANEOUS at 17:18

## 2020-09-27 RX ADMIN — RIVAROXABAN 20 MG: 20 TABLET, FILM COATED ORAL at 17:18

## 2020-09-27 RX ADMIN — PREDNISONE 40 MG: 20 TABLET ORAL at 07:23

## 2020-09-27 RX ADMIN — AMLODIPINE BESYLATE 10 MG: 10 TABLET ORAL at 09:25

## 2020-09-27 RX ADMIN — FAMOTIDINE 20 MG: 20 TABLET, FILM COATED ORAL at 17:18

## 2020-09-27 RX ADMIN — COLCHICINE 0.6 MG: 0.6 TABLET ORAL at 09:25

## 2020-09-27 RX ADMIN — HYDRALAZINE HYDROCHLORIDE 50 MG: 50 TABLET, FILM COATED ORAL at 17:19

## 2020-09-27 RX ADMIN — SODIUM CHLORIDE, PRESERVATIVE FREE 10 ML: 5 INJECTION INTRAVENOUS at 09:26

## 2020-09-27 RX ADMIN — ASPIRIN 81 MG: 81 TABLET, COATED ORAL at 09:25

## 2020-09-28 ENCOUNTER — RESULTS ENCOUNTER (OUTPATIENT)
Dept: FAMILY MEDICINE CLINIC | Facility: CLINIC | Age: 70
End: 2020-09-28

## 2020-09-28 VITALS
SYSTOLIC BLOOD PRESSURE: 166 MMHG | RESPIRATION RATE: 16 BRPM | HEIGHT: 72 IN | DIASTOLIC BLOOD PRESSURE: 87 MMHG | OXYGEN SATURATION: 97 % | BODY MASS INDEX: 30.91 KG/M2 | HEART RATE: 98 BPM | TEMPERATURE: 97.7 F | WEIGHT: 228.18 LBS

## 2020-09-28 DIAGNOSIS — M1A.9XX0 CHRONIC GOUT WITHOUT TOPHUS, UNSPECIFIED CAUSE, UNSPECIFIED SITE: ICD-10-CM

## 2020-09-28 DIAGNOSIS — N18.30 CHRONIC KIDNEY DISEASE, STAGE III (MODERATE) (HCC): ICD-10-CM

## 2020-09-28 LAB
ALBUMIN SERPL-MCNC: 3.1 G/DL (ref 3.5–5.2)
ANION GAP SERPL CALCULATED.3IONS-SCNC: 11.3 MMOL/L (ref 5–15)
B PARAPERT DNA SPEC QL NAA+PROBE: NOT DETECTED
B PERT DNA SPEC QL NAA+PROBE: NOT DETECTED
BASOPHILS # BLD AUTO: 0.03 10*3/MM3 (ref 0–0.2)
BASOPHILS NFR BLD AUTO: 0.4 % (ref 0–1.5)
BUN SERPL-MCNC: 55 MG/DL (ref 8–23)
BUN/CREAT SERPL: 32.7 (ref 7–25)
C PNEUM DNA NPH QL NAA+NON-PROBE: NOT DETECTED
CALCIUM SPEC-SCNC: 9.4 MG/DL (ref 8.6–10.5)
CHLORIDE SERPL-SCNC: 94 MMOL/L (ref 98–107)
CO2 SERPL-SCNC: 30.7 MMOL/L (ref 22–29)
CREAT SERPL-MCNC: 1.68 MG/DL (ref 0.76–1.27)
DEPRECATED RDW RBC AUTO: 41.2 FL (ref 37–54)
EOSINOPHIL # BLD AUTO: 0.19 10*3/MM3 (ref 0–0.4)
EOSINOPHIL NFR BLD AUTO: 2.6 % (ref 0.3–6.2)
ERYTHROCYTE [DISTWIDTH] IN BLOOD BY AUTOMATED COUNT: 13.5 % (ref 12.3–15.4)
FLUAV H1 2009 PAND RNA NPH QL NAA+PROBE: NOT DETECTED
FLUAV H1 HA GENE NPH QL NAA+PROBE: NOT DETECTED
FLUAV H3 RNA NPH QL NAA+PROBE: NOT DETECTED
FLUAV SUBTYP SPEC NAA+PROBE: NOT DETECTED
FLUBV RNA ISLT QL NAA+PROBE: NOT DETECTED
GFR SERPL CREATININE-BSD FRML MDRD: 40 ML/MIN/1.73
GLUCOSE BLDC GLUCOMTR-MCNC: 221 MG/DL (ref 70–130)
GLUCOSE BLDC GLUCOMTR-MCNC: 298 MG/DL (ref 70–130)
GLUCOSE BLDC GLUCOMTR-MCNC: 352 MG/DL (ref 70–130)
GLUCOSE SERPL-MCNC: 232 MG/DL (ref 65–99)
HADV DNA SPEC NAA+PROBE: NOT DETECTED
HCOV 229E RNA SPEC QL NAA+PROBE: NOT DETECTED
HCOV HKU1 RNA SPEC QL NAA+PROBE: NOT DETECTED
HCOV NL63 RNA SPEC QL NAA+PROBE: NOT DETECTED
HCOV OC43 RNA SPEC QL NAA+PROBE: NOT DETECTED
HCT VFR BLD AUTO: 31.1 % (ref 37.5–51)
HGB BLD-MCNC: 9.8 G/DL (ref 13–17.7)
HMPV RNA NPH QL NAA+NON-PROBE: NOT DETECTED
HPIV1 RNA SPEC QL NAA+PROBE: NOT DETECTED
HPIV2 RNA SPEC QL NAA+PROBE: NOT DETECTED
HPIV3 RNA NPH QL NAA+PROBE: NOT DETECTED
HPIV4 P GENE NPH QL NAA+PROBE: NOT DETECTED
IMM GRANULOCYTES # BLD AUTO: 0.02 10*3/MM3 (ref 0–0.05)
IMM GRANULOCYTES NFR BLD AUTO: 0.3 % (ref 0–0.5)
LYMPHOCYTES # BLD AUTO: 1.33 10*3/MM3 (ref 0.7–3.1)
LYMPHOCYTES NFR BLD AUTO: 18.1 % (ref 19.6–45.3)
M PNEUMO IGG SER IA-ACNC: NOT DETECTED
MCH RBC QN AUTO: 26.5 PG (ref 26.6–33)
MCHC RBC AUTO-ENTMCNC: 31.5 G/DL (ref 31.5–35.7)
MCV RBC AUTO: 84.1 FL (ref 79–97)
MONOCYTES # BLD AUTO: 0.58 10*3/MM3 (ref 0.1–0.9)
MONOCYTES NFR BLD AUTO: 7.9 % (ref 5–12)
NEUTROPHILS NFR BLD AUTO: 5.18 10*3/MM3 (ref 1.7–7)
NEUTROPHILS NFR BLD AUTO: 70.7 % (ref 42.7–76)
NRBC BLD AUTO-RTO: 0 /100 WBC (ref 0–0.2)
PHOSPHATE SERPL-MCNC: 4.5 MG/DL (ref 2.5–4.5)
PLATELET # BLD AUTO: 283 10*3/MM3 (ref 140–450)
PMV BLD AUTO: 9.1 FL (ref 6–12)
POTASSIUM SERPL-SCNC: 4 MMOL/L (ref 3.5–5.2)
RBC # BLD AUTO: 3.7 10*6/MM3 (ref 4.14–5.8)
RHINOVIRUS RNA SPEC NAA+PROBE: NOT DETECTED
RSV RNA NPH QL NAA+NON-PROBE: NOT DETECTED
SARS-COV-2 RNA NPH QL NAA+NON-PROBE: NOT DETECTED
SODIUM SERPL-SCNC: 136 MMOL/L (ref 136–145)
WBC # BLD AUTO: 7.33 10*3/MM3 (ref 3.4–10.8)

## 2020-09-28 PROCEDURE — 63710000001 INSULIN LISPRO (HUMAN) PER 5 UNITS: Performed by: HOSPITALIST

## 2020-09-28 PROCEDURE — 85025 COMPLETE CBC W/AUTO DIFF WBC: CPT | Performed by: HOSPITALIST

## 2020-09-28 PROCEDURE — 80069 RENAL FUNCTION PANEL: CPT | Performed by: INTERNAL MEDICINE

## 2020-09-28 PROCEDURE — 0202U NFCT DS 22 TRGT SARS-COV-2: CPT | Performed by: HOSPITALIST

## 2020-09-28 PROCEDURE — 63710000001 PREDNISONE PER 1 MG: Performed by: INTERNAL MEDICINE

## 2020-09-28 PROCEDURE — 97110 THERAPEUTIC EXERCISES: CPT

## 2020-09-28 PROCEDURE — 82962 GLUCOSE BLOOD TEST: CPT

## 2020-09-28 RX ORDER — HYDROCODONE BITARTRATE AND ACETAMINOPHEN 5; 325 MG/1; MG/1
1 TABLET ORAL EVERY 6 HOURS PRN
Qty: 20 TABLET | Refills: 0 | Status: SHIPPED | OUTPATIENT
Start: 2020-09-28 | End: 2020-09-30

## 2020-09-28 RX ORDER — POLYETHYLENE GLYCOL 3350 17 G/17G
17 POWDER, FOR SOLUTION ORAL DAILY
Start: 2020-09-29 | End: 2022-11-02

## 2020-09-28 RX ORDER — COLCHICINE 0.6 MG/1
0.6 TABLET ORAL DAILY
Qty: 10 TABLET | Refills: 0
Start: 2020-09-29 | End: 2020-10-09

## 2020-09-28 RX ORDER — BUMETANIDE 1 MG/1
1 TABLET ORAL DAILY
Start: 2020-09-29 | End: 2020-10-22 | Stop reason: SDUPTHER

## 2020-09-28 RX ORDER — ALLOPURINOL 300 MG/1
300 TABLET ORAL DAILY
Qty: 30 TABLET | Refills: 9
Start: 2020-09-29 | End: 2021-07-10

## 2020-09-28 RX ORDER — ACETAMINOPHEN 325 MG/1
650 TABLET ORAL EVERY 4 HOURS PRN
Start: 2020-09-28

## 2020-09-28 RX ORDER — PREDNISONE 10 MG/1
30 TABLET ORAL
Qty: 15 TABLET | Refills: 0
Start: 2020-09-29 | End: 2020-10-04

## 2020-09-28 RX ORDER — ALLOPURINOL 300 MG/1
300 TABLET ORAL DAILY
Status: DISCONTINUED | OUTPATIENT
Start: 2020-09-29 | End: 2020-09-28 | Stop reason: HOSPADM

## 2020-09-28 RX ADMIN — INSULIN LISPRO 8 UNITS: 100 INJECTION, SOLUTION INTRAVENOUS; SUBCUTANEOUS at 08:25

## 2020-09-28 RX ADMIN — FAMOTIDINE 20 MG: 20 TABLET, FILM COATED ORAL at 06:35

## 2020-09-28 RX ADMIN — INSULIN LISPRO 20 UNITS: 100 INJECTION, SOLUTION INTRAVENOUS; SUBCUTANEOUS at 17:34

## 2020-09-28 RX ADMIN — COLCHICINE 0.6 MG: 0.6 TABLET ORAL at 08:25

## 2020-09-28 RX ADMIN — RIVAROXABAN 20 MG: 20 TABLET, FILM COATED ORAL at 17:33

## 2020-09-28 RX ADMIN — HYDRALAZINE HYDROCHLORIDE 50 MG: 50 TABLET, FILM COATED ORAL at 12:14

## 2020-09-28 RX ADMIN — BUMETANIDE 1 MG: 1 TABLET ORAL at 08:24

## 2020-09-28 RX ADMIN — HYDRALAZINE HYDROCHLORIDE 50 MG: 50 TABLET, FILM COATED ORAL at 08:24

## 2020-09-28 RX ADMIN — ACETAMINOPHEN 650 MG: 325 TABLET, FILM COATED ORAL at 08:25

## 2020-09-28 RX ADMIN — ALLOPURINOL 200 MG: 100 TABLET ORAL at 08:24

## 2020-09-28 RX ADMIN — HYDRALAZINE HYDROCHLORIDE 50 MG: 50 TABLET, FILM COATED ORAL at 17:34

## 2020-09-28 RX ADMIN — SODIUM CHLORIDE, PRESERVATIVE FREE 10 ML: 5 INJECTION INTRAVENOUS at 08:26

## 2020-09-28 RX ADMIN — PREDNISONE 40 MG: 20 TABLET ORAL at 08:24

## 2020-09-28 RX ADMIN — FAMOTIDINE 20 MG: 20 TABLET, FILM COATED ORAL at 17:33

## 2020-09-28 RX ADMIN — INSULIN LISPRO 12 UNITS: 100 INJECTION, SOLUTION INTRAVENOUS; SUBCUTANEOUS at 12:14

## 2020-09-28 RX ADMIN — AMLODIPINE BESYLATE 10 MG: 10 TABLET ORAL at 08:25

## 2020-09-28 RX ADMIN — ASPIRIN 81 MG: 81 TABLET, COATED ORAL at 08:25

## 2020-09-29 LAB
BACTERIA FLD CULT: NORMAL
BACTERIA FLD CULT: NORMAL
GRAM STN SPEC: NORMAL

## 2020-10-01 ENCOUNTER — EPISODE CHANGES (OUTPATIENT)
Dept: CASE MANAGEMENT | Facility: OTHER | Age: 70
End: 2020-10-01

## 2020-10-18 DIAGNOSIS — M10.9 GOUT OF FOOT, UNSPECIFIED CAUSE, UNSPECIFIED CHRONICITY, UNSPECIFIED LATERALITY: ICD-10-CM

## 2020-10-19 NOTE — TELEPHONE ENCOUNTER
It says in the chart that this prescription was discontinued.  I think we need to call him and find out if he is actually still taking it.

## 2020-10-20 ENCOUNTER — TELEPHONE (OUTPATIENT)
Dept: INTERNAL MEDICINE | Facility: CLINIC | Age: 70
End: 2020-10-20

## 2020-10-20 RX ORDER — COLCHICINE 0.6 MG/1
TABLET, FILM COATED ORAL
Qty: 30 TABLET | Refills: 4 | OUTPATIENT
Start: 2020-10-20

## 2020-10-21 NOTE — TELEPHONE ENCOUNTER
PATIENT'S COUSIN, NANDA MULLEN, CALLED STATING THAT THE PATIENT, ALBA JUAREZ, WAS IN THE HOSPITAL ABOUT 3 WEEKS AGO AND HE WAS TREATED FOR PNEUMONIA AND GOUT. THE PATIENT'S COUSIN STATED THAT THE PATIENT WAS HOSPITALIZED FOR ABOUT A WEEK, AND THEN WHEN HE WAS DISCHARGED FROM THE HOSPITAL, HE WENT IMMEDIATELY TO A REHAB CENTER FOR ABOUT 2 WEEKS. THE PATIENT'S COUSIN STATED THAT THE PATIENT CAME HOME THIS PAST SATURDAY (10/17/20), AND HE NOW HAS IN-HOME HOME HEALTH NURSES AND PHYSICAL THERAPISTS COMING FOR IN-HOME VISITS.    THE PATIENT'S COUSIN STATED THAT DR. ZELAYA PRESCRIBED THE PATIENT THE COLCHICINE 0.6 MG TABLET A WHILE BACK, BUT THE PATIENT'S NEPHROLOGIST, DR. RONNELL PARMAR, TOOK HIM OFF OF IT, AS HE THOUGHT THAT IT WOULD BE HARD ON HIS KIDNEY FUNCTION. HOWEVER, THE PATIENT'S COUSIN STATED THAT THE PATIENT HAS HAD 3 GOUT FLARE-UPS SINCE BEING OFF OF THIS MEDICATION, AND EVERY TIME THIS OCCURS, HE ENDS UP GETTING PRESCRIBED IT AGAIN TEMPORARILY, AND IT IS THE ONLY THING THAT REALLY HELPS TREAT THE FLARE UPS.    THE PATIENT'S COUSIN STATED THAT WHEN THE PATIENT WAS IN THE HOSPITAL, THEY PULLED FLUID OFF OF HIS KNEE AND FOUND THAT THE PATIENT HAS 2 DIFFERENT KINDS OF GOUT. IT WAS AT THIS POINT, THE PATIENT'S COUSIN STATED, THAT DR. RONNELL PARMAR ADDED THE COLCHICINE 0.6 MG TABLET TO HIS CURRENT LIST OF MEDICATIONS. HOWEVER, THE PATIENT'S COUSIN STATED THAT SINCE THE PATIENT LEFT THE REHAB CENTER THIS PAST SATURDAY (10/17/20), HE HAS NOT HAD THIS MEDICATION.     THE PATIENT'S COUSIN STATED THAT SHE HAD AN OLD BOTTLE OF THE COLCHICINE 0.6 MG TABLET PRESCRIBED TO THE PATIENT BY DR. ZELAYA, SO SHE TOOK IT TO THE PHARMACY SATURDAY (10/17/20) EVENING AND REQUESTED FOR IT TO BE REFILLED, AS IT IS ON HIS LIST OF MEDICATIONS THAT HE SHOULD BE TAKING, PER THE HOSPITAL AND REHAB CENTER.    THE PATIENT'S COUSIN STATED THAT SHE WENT TO THE PHARMACY ([facility] IN [facility] ON [address] - PHONE NUMBER:  [phone]) YESTERDAY (10/20/20) TO  THE PATIENT'S PRESCRIPTIONS, AND THEY ADVISED HER THAT DR. ZELAYA HAD DECLINED THE REFILL AND THAT HE NEEDED TO SPEAK WITH HER REGARDING WHETHER OR NOT THE PATIENT IS STILL TAKING THIS MEDICATION.    THE PATIENT'S COUSIN STATED THAT SHE WANTED TO MAKE DR. ZELAYA AWARE OF ALL OF THIS, SO THAT HE CAN BE INFORMED OF WHY THE PATIENT HAS MOST RECENTLY BEEN TAKING THE COLCHICINE 0.6 MG TABLET.    ADDITIONALLY, THE PATIENT'S COUSIN STATED THAT DR. RONNELL PARMAR INCREASED THE PATIENT'S DOSE OF THE ALLOPURINOL (ZYLOPRIM) FROM 100 MG TABLET  MG TABLET WHILE HE WAS IN THE HOSPITAL. THE PATIENT'S COUSIN STATED THAT SHE WANTED TO MAKE DR. ZELAYA AWARE OF THIS AS WELL, IN CASE IT WAS RELEVANT TO THIS SITUATION.    THE PATIENT'S COUSIN STATED THAT THE PATIENT WAS TAKING 1 TABLET A DAY OF THE COLCHICINE 0.6 MG TABLET, AND HE IS CURRENTLY COMPLETELY OUT OF THIS MEDICATION.    PLEASE CALL THE PATIENT'S COUSIN -[phone] WHEN THIS MESSAGE HAS BEEN RECEIVED AND ADVISE HER REGARDING DR. ZELAYA'S RESPONSE TO THIS CONCERN/REQUEST.

## 2020-10-22 ENCOUNTER — TELEPHONE (OUTPATIENT)
Dept: INTERNAL MEDICINE | Facility: CLINIC | Age: 70
End: 2020-10-22

## 2020-10-22 ENCOUNTER — OFFICE VISIT (OUTPATIENT)
Dept: FAMILY MEDICINE CLINIC | Facility: CLINIC | Age: 70
End: 2020-10-22

## 2020-10-22 VITALS
DIASTOLIC BLOOD PRESSURE: 66 MMHG | BODY MASS INDEX: 32.95 KG/M2 | OXYGEN SATURATION: 98 % | HEART RATE: 85 BPM | HEIGHT: 72 IN | SYSTOLIC BLOOD PRESSURE: 155 MMHG | TEMPERATURE: 97.5 F | WEIGHT: 243.3 LBS

## 2020-10-22 DIAGNOSIS — N18.30 STAGE 3 CHRONIC KIDNEY DISEASE, UNSPECIFIED WHETHER STAGE 3A OR 3B CKD (HCC): Primary | ICD-10-CM

## 2020-10-22 DIAGNOSIS — M1A.9XX0 CHRONIC GOUT WITHOUT TOPHUS, UNSPECIFIED CAUSE, UNSPECIFIED SITE: ICD-10-CM

## 2020-10-22 DIAGNOSIS — R60.0 EDEMA OF LOWER EXTREMITY: ICD-10-CM

## 2020-10-22 PROCEDURE — 99215 OFFICE O/P EST HI 40 MIN: CPT | Performed by: FAMILY MEDICINE

## 2020-10-22 RX ORDER — BUMETANIDE 2 MG/1
2 TABLET ORAL DAILY
Qty: 30 TABLET | Refills: 0 | Status: SHIPPED | OUTPATIENT
Start: 2020-10-22 | End: 2020-11-18

## 2020-10-22 NOTE — PROGRESS NOTES
Hospital Care Follow Up Visit  Subjective     Javier Torres is a 70 y.o. male who presents for a hospital follow-up visit.    I reviewed and discussed the discharge summary, hospital problems, inpatient lab results, inpatient diagnostic studies, and consultation reports with Javier.     Current outpatient and discharge medications have been reconciled for the patient.  Reviewed by: Federico Humphrey MD      No flowsheet data found.  Risk for Readmission (LACE) No data recorded    History of Present Illness   Course During Hospital Stay:      Dr. Bravo is his kidney doctor at [facility].  He was admitted from September 22, 2022 September 28, 2020 and treated for aspiration pneumonia, acute gout of knee which was drained and showed to have pseudogout as well as gout crystals.  He also had acute kidney injury on top of his chronic kidney disease stage III.  He was gently diuresed and treated for the aspiration pneumonia.  At the time he left the hospital his last recorded weight was approximately 17 pounds lower than the weight I have on file now.  His wife says that his ankles have been getting gradually larger.  He is on Bumex 1 mg for diuresis currently.  They have not gone any higher than that in the next follow-up appointment with Dr. Rubin is in January 2021.  Denies any shortness of breath, cough, increasing fatigue.     The following portions of the patient's history were reviewed and updated as appropriate: allergies, current medications, past family history, past medical history, past social history, past surgical history and problem list.    Review of Systems   Constitutional: Negative for activity change, appetite change, fatigue and fever.   HENT: Negative for ear pain, facial swelling and sore throat.    Eyes: Negative for discharge and itching.   Respiratory: Negative for cough, chest tightness and shortness of breath.    Cardiovascular: Negative for chest pain and palpitations.   Gastrointestinal:  Negative for abdominal distention and constipation.   Endocrine: Negative for polydipsia, polyphagia and polyuria.   Genitourinary: Negative for difficulty urinating and flank pain.   Musculoskeletal: Negative for arthralgias and back pain.   Skin: Negative for color change, rash and wound.   Allergic/Immunologic: Negative for environmental allergies and food allergies.   Neurological: Negative for weakness and numbness.   Hematological: Negative for adenopathy. Does not bruise/bleed easily.   Psychiatric/Behavioral: Negative for decreased concentration and dysphoric mood. The patient is not nervous/anxious.        Objective   Physical Exam  Vitals signs and nursing note reviewed.   Constitutional:       General: He is not in acute distress.     Appearance: He is well-developed.   HENT:      Mouth/Throat:      Pharynx: No oropharyngeal exudate.   Eyes:      General:         Right eye: No discharge.         Left eye: No discharge.      Conjunctiva/sclera: Conjunctivae normal.   Neck:      Musculoskeletal: Normal range of motion and neck supple.   Cardiovascular:      Rate and Rhythm: Normal rate and regular rhythm.      Heart sounds: Normal heart sounds. No murmur. No friction rub. No gallop.    Pulmonary:      Effort: Pulmonary effort is normal.      Breath sounds: Normal breath sounds. No wheezing or rales.   Abdominal:      General: Bowel sounds are normal. There is no distension.      Palpations: Abdomen is soft.      Tenderness: There is no abdominal tenderness.   Musculoskeletal:         General: No deformity.      Comments: 2+ bilateral lower extremity edema   Lymphadenopathy:      Cervical: No cervical adenopathy.   Skin:     General: Skin is warm and dry.      Findings: No rash.   Neurological:      Mental Status: He is alert and oriented to person, place, and time.   Psychiatric:         Behavior: Behavior normal.         Assessment/Plan   Diagnoses and all orders for this visit:    1. Stage 3 chronic  "kidney disease, unspecified whether stage 3a or 3b CKD (Primary)  -     Comprehensive Metabolic Panel  -     CBC (No Diff)  -     bumetanide (BUMEX) 2 MG tablet; Take 1 tablet by mouth Daily.  Dispense: 30 tablet; Refill: 0    2. Chronic gout without tophus, unspecified cause, unspecified site  -     Uric acid    3. Edema of lower extremity  -     Comprehensive Metabolic Panel  -     CBC (No Diff)  -     bumetanide (BUMEX) 2 MG tablet; Take 1 tablet by mouth Daily.  Dispense: 30 tablet; Refill: 0      On exam and with his history it appears that he is volume overloaded again.  However he does not seem like he is at a point where he needs to go back to the hospital.  I have placed the chat message to his nephrologist to see if there is anything else he has to add.  I will send him a copy of today's note and he should have access to the labs through [employer].  I have temporarily increased his Bumex to 2 mg and will see if his nephrologist wants to change it even further.  We will also get a recheck of his uric acid.    After the encounter, Dr. Rubin got back to me and stated \"reviewed your note and I agree with increasing his Bumex up to 2 mg.  He did pretty well at that dose in the hospital initially but we had to decrease it once it looked like he was a bit over-diuresed.  I think his edema will improve with the increased dose, if not we can add some metolazone, 5 mg 2-3 times a week as needed.\"    I will get in touch with the family and let them know.  May have him back sooner to get his blood work rechecked.           "

## 2020-10-22 NOTE — TELEPHONE ENCOUNTER
Keegan with [employer] called in to inform the doctor that the patient will not need additional OT.      Best call back # [phone]

## 2020-10-23 ENCOUNTER — TELEPHONE (OUTPATIENT)
Dept: INTERNAL MEDICINE | Facility: CLINIC | Age: 70
End: 2020-10-23

## 2020-10-23 LAB
ALBUMIN SERPL-MCNC: 3.6 G/DL (ref 3.7–4.7)
ALBUMIN/GLOB SERPL: 1.6 {RATIO} (ref 1.2–2.2)
ALP SERPL-CCNC: 71 IU/L (ref 39–117)
ALT SERPL-CCNC: 25 IU/L (ref 0–44)
AST SERPL-CCNC: 20 IU/L (ref 0–40)
BILIRUB SERPL-MCNC: <0.2 MG/DL (ref 0–1.2)
BUN SERPL-MCNC: 28 MG/DL (ref 8–27)
BUN/CREAT SERPL: 16 (ref 10–24)
CALCIUM SERPL-MCNC: 9.6 MG/DL (ref 8.6–10.2)
CHLORIDE SERPL-SCNC: 102 MMOL/L (ref 96–106)
CO2 SERPL-SCNC: 25 MMOL/L (ref 20–29)
CREAT SERPL-MCNC: 1.75 MG/DL (ref 0.76–1.27)
ERYTHROCYTE [DISTWIDTH] IN BLOOD BY AUTOMATED COUNT: 15.9 % (ref 11.6–15.4)
GLOBULIN SER CALC-MCNC: 2.3 G/DL (ref 1.5–4.5)
GLUCOSE SERPL-MCNC: 141 MG/DL (ref 65–99)
HCT VFR BLD AUTO: 33.1 % (ref 37.5–51)
HGB BLD-MCNC: 10.3 G/DL (ref 13–17.7)
MCH RBC QN AUTO: 27.2 PG (ref 26.6–33)
MCHC RBC AUTO-ENTMCNC: 31.1 G/DL (ref 31.5–35.7)
MCV RBC AUTO: 87 FL (ref 79–97)
PLATELET # BLD AUTO: 381 X10E3/UL (ref 150–450)
POTASSIUM SERPL-SCNC: 4.3 MMOL/L (ref 3.5–5.2)
PROT SERPL-MCNC: 5.9 G/DL (ref 6–8.5)
RBC # BLD AUTO: 3.79 X10E6/UL (ref 4.14–5.8)
SODIUM SERPL-SCNC: 142 MMOL/L (ref 134–144)
URATE SERPL-MCNC: 4.6 MG/DL (ref 3.7–8.6)
WBC # BLD AUTO: 5.2 X10E3/UL (ref 3.4–10.8)

## 2020-10-23 NOTE — TELEPHONE ENCOUNTER
----- Message from Blake Rubin MD sent at 10/22/2020  3:15 PM EDT -----  I reviewed your note and I agree with increasing his Bumex up to 2 mg.  He did pretty well at that dose in the hospital initially but we had to decrease it once it looked like he was a bit over-diuresed.  I think his edema will improve with the increased dose, if not we can add some metolazone, 5 mg 2-3 times a week as needed.      ----- Message -----  From: Federico Humphrey MD  Sent: 10/22/2020   1:50 PM EDT  To: Blake Rubin MD    Please let me know if you have anything to add for the edema.  He should have labs up tomorrow.

## 2020-10-23 NOTE — TELEPHONE ENCOUNTER
Would you please call the patient let them know that I got in touch with the nephrologist?  He said he agreed with the increase in Bumex and there is another medication that we could use if the Bumex is not helping.  I want to see him back in clinic in 1 week for recheck of his weight and to get another lab drawn to make sure his kidneys are tolerating the increased Bumex.

## 2020-10-26 ENCOUNTER — TELEPHONE (OUTPATIENT)
Dept: INTERNAL MEDICINE | Facility: CLINIC | Age: 70
End: 2020-10-26

## 2020-10-26 NOTE — TELEPHONE ENCOUNTER
Nataliya from [facility] called requesting verbal orders for Mr. Torres to have a tele-health monitor and Nursing 2 times x 1 week and thereafter 1 time x 7 weeks.     Please advise.    ThanksPrateek

## 2020-10-29 ENCOUNTER — OFFICE VISIT (OUTPATIENT)
Dept: FAMILY MEDICINE CLINIC | Facility: CLINIC | Age: 70
End: 2020-10-29

## 2020-10-29 VITALS
WEIGHT: 243.6 LBS | OXYGEN SATURATION: 100 % | SYSTOLIC BLOOD PRESSURE: 160 MMHG | HEART RATE: 55 BPM | HEIGHT: 72 IN | RESPIRATION RATE: 18 BRPM | TEMPERATURE: 97.7 F | DIASTOLIC BLOOD PRESSURE: 80 MMHG | BODY MASS INDEX: 33 KG/M2

## 2020-10-29 DIAGNOSIS — N18.30 STAGE 3 CHRONIC KIDNEY DISEASE, UNSPECIFIED WHETHER STAGE 3A OR 3B CKD (HCC): Primary | ICD-10-CM

## 2020-10-29 DIAGNOSIS — Z23 ENCOUNTER FOR IMMUNIZATION: ICD-10-CM

## 2020-10-29 PROCEDURE — 99214 OFFICE O/P EST MOD 30 MIN: CPT | Performed by: FAMILY MEDICINE

## 2020-10-29 PROCEDURE — 90694 VACC AIIV4 NO PRSRV 0.5ML IM: CPT | Performed by: FAMILY MEDICINE

## 2020-10-29 PROCEDURE — G0008 ADMIN INFLUENZA VIRUS VAC: HCPCS | Performed by: FAMILY MEDICINE

## 2020-10-29 NOTE — PATIENT INSTRUCTIONS
Chronic Kidney Disease, Adult  Chronic kidney disease (CKD) happens when the kidneys are damaged over a long period of time. The kidneys are two organs that help with:  · Getting rid of waste and extra fluid from the blood.  · Making hormones that maintain the amount of fluid in your tissues and blood vessels.  · Making sure that the body has the right amount of fluids and chemicals.  Most of the time, CKD does not go away, but it can usually be controlled. Steps must be taken to slow down the kidney damage or to stop it from getting worse. If this is not done, the kidneys may stop working.  Follow these instructions at home:  Medicines  · Take over-the-counter and prescription medicines only as told by your doctor. You may need to change the amount of medicines you take.  · Do not take any new medicines unless your doctor says it is okay. Many medicines can make your kidney damage worse.  · Do not take any vitamin and supplements unless your doctor says it is okay. Many vitamins and supplements can make your kidney damage worse.  General instructions  · Follow a diet as told by your doctor. You may need to stay away from:  ? Alcohol.  ? Salty foods.  ? Foods that are high in:  § Potassium.  § Calcium.  § Protein.  · Do not use any products that contain nicotine or tobacco, such as cigarettes and e-cigarettes. If you need help quitting, ask your doctor.  · Keep track of your blood pressure at home. Tell your doctor about any changes.  · If you have diabetes, keep track of your blood sugar as told by your doctor.  · Try to stay at a healthy weight. If you need help, ask your doctor.  · Exercise at least 30 minutes a day, 5 days a week.  · Stay up-to-date with your shots (immunizations) as told by your doctor.  · Keep all follow-up visits as told by your doctor. This is important.  Contact a doctor if:  · Your symptoms get worse.  · You have new symptoms.  Get help right away if:  · You have symptoms of end-stage  kidney disease. These may include:  ? Headaches.  ? Numbness in your hands or feet.  ? Easy bruising.  ? Having hiccups often.  ? Chest pain.  ? Shortness of breath.  ? Stopping of menstrual periods in women.  · You have a fever.  · You have very little pee (urine).  · You have pain or bleeding when you pee.  Summary  · Chronic kidney disease (CKD) happens when the kidneys are damaged over a long period of time.  · Most of the time, this condition does not go away, but it can usually be controlled. Steps must be taken to slow down the kidney damage or to stop it from getting worse.  · Treatment may include a combination of medicines and lifestyle changes.  This information is not intended to replace advice given to you by your health care provider. Make sure you discuss any questions you have with your health care provider.  Document Released: 03/14/2011 Document Revised: 11/30/2018 Document Reviewed: 01/22/2018  Elsevier Patient Education © 2020 Elsevier Inc.

## 2020-10-29 NOTE — PROGRESS NOTES
Subjective   Javier Torres is a 70 y.o. male.  Follow-up chronic kidney disease and hypertension.    History of Present Illness     Chronic kidney disease-I saw the patient 1 week ago after hospital follow-up and monitoring his Bumex.  His labs on October 22, 2020 showed a creatinine of 1.75 which was a little higher than his normal.  Today he needs repeat labs.  I had spoken with his nephrologist who agreed with the increase Bumex to 2 mg.  His nephrologist said also metolazone could be an option if he is not doing better.  Weight today is stabilized at 243.  They have not noted any increase in his edema.    HTN is uncontrolled.  He is already maxed on amlodipine and hydralazine.  He is taking the Bumex for diuresis which was increased last week.  Denies chest pain, shortness of breath, headache.    The following portions of the patient's history were reviewed and updated as appropriate: allergies, current medications, past family history, past medical history, past social history, past surgical history and problem list.    Review of Systems   Constitutional: Negative for fatigue and fever.   Respiratory: Negative for shortness of breath and wheezing.    Cardiovascular: Positive for leg swelling. Negative for chest pain and palpitations.   Gastrointestinal: Negative for constipation and nausea.       Objective   Physical Exam  Vitals signs and nursing note reviewed.   Constitutional:       General: He is not in acute distress.     Appearance: He is well-developed. He is not diaphoretic.   Cardiovascular:      Rate and Rhythm: Normal rate and regular rhythm.      Heart sounds: Normal heart sounds. No murmur. No friction rub. No gallop.    Pulmonary:      Effort: Pulmonary effort is normal.      Breath sounds: Normal breath sounds. No wheezing or rales.   Musculoskeletal:      Right lower leg: Edema present.      Left lower leg: Edema present.   Skin:     General: Skin is warm.      Capillary Refill: Capillary refill  takes less than 2 seconds.         Assessment/Plan   Diagnoses and all orders for this visit:    1. Stage 3 chronic kidney disease, unspecified whether stage 3a or 3b CKD (Primary)  -     Comprehensive Metabolic Panel  -     Urinalysis With Microscopic - Urine, Clean Catch  -     CBC No Differential    Will communicate with Dr. Rubin regarding his labs today and discuss options for blood pressure control.  I will send a copy of today's note and he should have access to the labs through [employer].

## 2020-10-30 ENCOUNTER — DOCUMENTATION (OUTPATIENT)
Dept: INTERNAL MEDICINE | Facility: CLINIC | Age: 70
End: 2020-10-30

## 2020-10-30 ENCOUNTER — PATIENT OUTREACH (OUTPATIENT)
Dept: CASE MANAGEMENT | Facility: OTHER | Age: 70
End: 2020-10-30

## 2020-10-30 LAB
ALBUMIN SERPL-MCNC: 4.4 G/DL (ref 3.5–5.2)
ALBUMIN/GLOB SERPL: 2.1 G/DL
ALP SERPL-CCNC: 77 U/L (ref 39–117)
ALT SERPL-CCNC: 18 U/L (ref 1–41)
APPEARANCE UR: CLEAR
AST SERPL-CCNC: 17 U/L (ref 1–40)
BACTERIA #/AREA URNS HPF: NORMAL /HPF
BILIRUB SERPL-MCNC: 0.2 MG/DL (ref 0–1.2)
BILIRUB UR QL STRIP: NEGATIVE
BUN SERPL-MCNC: 29 MG/DL (ref 8–23)
BUN/CREAT SERPL: 15.7 (ref 7–25)
CALCIUM SERPL-MCNC: 9.8 MG/DL (ref 8.6–10.5)
CASTS URNS MICRO: NORMAL
CHLORIDE SERPL-SCNC: 98 MMOL/L (ref 98–107)
CO2 SERPL-SCNC: 30.2 MMOL/L (ref 22–29)
COLOR UR: YELLOW
CREAT SERPL-MCNC: 1.85 MG/DL (ref 0.76–1.27)
EPI CELLS #/AREA URNS HPF: NORMAL /HPF
ERYTHROCYTE [DISTWIDTH] IN BLOOD BY AUTOMATED COUNT: 15.9 % (ref 12.3–15.4)
GLOBULIN SER CALC-MCNC: 2.1 GM/DL
GLUCOSE SERPL-MCNC: 111 MG/DL (ref 65–99)
GLUCOSE UR QL: ABNORMAL
HCT VFR BLD AUTO: 35.2 % (ref 37.5–51)
HGB BLD-MCNC: 11.3 G/DL (ref 13–17.7)
HGB UR QL STRIP: NEGATIVE
KETONES UR QL STRIP: NEGATIVE
LEUKOCYTE ESTERASE UR QL STRIP: NEGATIVE
MCH RBC QN AUTO: 27.1 PG (ref 26.6–33)
MCHC RBC AUTO-ENTMCNC: 32.1 G/DL (ref 31.5–35.7)
MCV RBC AUTO: 84.4 FL (ref 79–97)
NITRITE UR QL STRIP: NEGATIVE
PH UR STRIP: 6.5 [PH] (ref 5–8)
PLATELET # BLD AUTO: 399 10*3/MM3 (ref 140–450)
POTASSIUM SERPL-SCNC: 4.4 MMOL/L (ref 3.5–5.2)
PROT SERPL-MCNC: 6.5 G/DL (ref 6–8.5)
PROT UR QL STRIP: NEGATIVE
RBC # BLD AUTO: 4.17 10*6/MM3 (ref 4.14–5.8)
RBC #/AREA URNS HPF: NORMAL /HPF
SODIUM SERPL-SCNC: 141 MMOL/L (ref 136–145)
SP GR UR: 1.01 (ref 1–1.03)
UROBILINOGEN UR STRIP-MCNC: ABNORMAL MG/DL
WBC # BLD AUTO: 6.78 10*3/MM3 (ref 3.4–10.8)
WBC #/AREA URNS HPF: NORMAL /HPF

## 2020-10-30 RX ORDER — METOLAZONE 5 MG/1
5 TABLET ORAL DAILY
Qty: 7 TABLET | Refills: 0 | Status: SHIPPED | OUTPATIENT
Start: 2020-10-30

## 2020-10-30 NOTE — OUTREACH NOTE
SNF Follow-up Note      Responses   Acute Facility Discharged From  [facility]   Acute Discharge Date  09/28/20   Name of the Skilled Nursing Facility?  [facility]   Who is the insurance provider or payor of patient stay?  Medicare   Progression of Patient?  discharged   Skilled Nursing Discharge Date?  10/17/20   Where was the patient discharged to?  Home      Spoke with  at [facility]. Patient was discharged home on 10/17/20. [facility] scheduled follow up outreach on 11/2/20.     Chanda Flores RN  Ambulatory     10/30/2020, 15:40 EDT  
WDL

## 2020-10-30 NOTE — PROGRESS NOTES
I was contacted by the [facility] nurse who stated that the patient has now gained 3 pounds since Wednesday and is bilateral lower extremity edema is worsening.  She is also hearing crackles in his lung bases bilaterally.  She denies that he is in distress however she is concerned with these changes in his blood sugars now running in the 300s.  Blood pressure is still high in the 160s over 100s.  With these changes, and we are about to going to the weekend, I recommended that he be taken to the ER for evaluation.    The [facility] nurse said she would call Dr. Rubin's office and keep him up-to-date as well.  I will copy Dr. Rubin on this note.

## 2020-11-17 ENCOUNTER — PATIENT OUTREACH (OUTPATIENT)
Dept: CASE MANAGEMENT | Facility: OTHER | Age: 70
End: 2020-11-17

## 2020-11-17 DIAGNOSIS — R60.0 EDEMA OF LOWER EXTREMITY: ICD-10-CM

## 2020-11-17 DIAGNOSIS — N18.30 STAGE 3 CHRONIC KIDNEY DISEASE, UNSPECIFIED WHETHER STAGE 3A OR 3B CKD (HCC): ICD-10-CM

## 2020-11-17 NOTE — OUTREACH NOTE
SNF Follow-up Note      Responses   Acute Facility Discharged From  [facility]   Acute Discharge Date  09/28/20   Name of the Skilled Nursing Facility?  [facility]   Purpose of SNF Admission  PT, OT, SN   Who is the insurance provider or payor of patient stay?  Medicare   Progression of Patient?  discharged   Skilled Nursing Discharge Date?  10/17/20   Where was the patient discharged to?  Home   Home Health Agency at discharge?  Yes   DME Needed at Discharge?  No [Patient using cane. Patient using home equipment for B/P, temp, and HR. ]   Are there any medication concerns at Discharge  No   Does the patient have a follow-up appointment?  Yes   Comments Regarding F/U Appt. ?  POA states patient has seen Dr. Humphrey twice since discharge. Last appointment was canceled due to hearing aide problems. POA plans to reschedule soon.       Spoke with patient's POA. Patient is doing very well. Patient is still being seen by Home Health. Patient is up and walking with minimal use of cane at this time. Patient has followed up with Dr. Humphrey twice since returning home from rehab. Patient does not need any DME. Patient was provided a device to monitor weight and vitals, but patient could not tolerate it and has returned to using his home scale, B/P, temp, and HR monitoring devices. Patient is hard of hearing and does not read. No concerns at this time. ACM to follow up in 2 weeks for continued assessment of needs during patient's transition.     Chanda Flores RN  Ambulatory     11/17/2020, 18:14 EST

## 2020-11-18 DIAGNOSIS — E11.21 TYPE 2 DIABETES WITH NEPHROPATHY (HCC): ICD-10-CM

## 2020-11-18 RX ORDER — INSULIN ASPART 100 [IU]/ML
20 INJECTION, SOLUTION INTRAVENOUS; SUBCUTANEOUS
Qty: 60 ML | Refills: 0 | Status: SHIPPED | OUTPATIENT
Start: 2020-11-18 | End: 2021-05-15

## 2020-11-18 RX ORDER — BUMETANIDE 2 MG/1
2 TABLET ORAL DAILY
Qty: 30 TABLET | Refills: 3 | Status: SHIPPED | OUTPATIENT
Start: 2020-11-18 | End: 2021-03-18

## 2020-11-18 RX ORDER — INSULIN DETEMIR 100 [IU]/ML
70 INJECTION, SOLUTION SUBCUTANEOUS NIGHTLY
Qty: 63 ML | Refills: 0 | Status: SHIPPED | OUTPATIENT
Start: 2020-11-18 | End: 2021-02-22

## 2020-11-18 NOTE — TELEPHONE ENCOUNTER
Last appointment 10/29/2020. Looks like he will be following you to [facility].   Okay to refill?

## 2020-11-18 NOTE — TELEPHONE ENCOUNTER
Yes, please see if Herminia can help him get in appointment.  They are booking me out to March right now and he will likely need to be seen before that.

## 2020-11-18 NOTE — TELEPHONE ENCOUNTER
Have left his cousin Coco a message to call me at [place] so I can make his appt at [facility] per Dr Humphrey

## 2020-11-18 NOTE — TELEPHONE ENCOUNTER
Please see note.  Can you take a look at Dr. Humphrey schedule at [facility] per his request and schedule patient an appointment. Thanks.

## 2020-11-19 DIAGNOSIS — E11.21 TYPE 2 DIABETES WITH NEPHROPATHY (HCC): ICD-10-CM

## 2020-11-19 RX ORDER — FAMOTIDINE 20 MG/1
TABLET, FILM COATED ORAL
Qty: 180 TABLET | Refills: 3
Start: 2020-11-19

## 2020-11-19 RX ORDER — COLCHICINE 0.6 MG/1
0.6 TABLET ORAL DAILY
Qty: 30 TABLET | Refills: 2
Start: 2020-11-19 | End: 2021-11-19

## 2020-11-19 RX ORDER — METOLAZONE 5 MG/1
5 TABLET ORAL EVERY OTHER DAY
Qty: 7 TABLET | Refills: 0
Start: 2020-11-19

## 2020-11-19 RX ORDER — ALLOPURINOL 100 MG/1
100 TABLET ORAL DAILY
Qty: 30 TABLET | Refills: 9
Start: 2020-11-19 | End: 2021-08-30

## 2020-11-27 DIAGNOSIS — I10 ESSENTIAL HYPERTENSION: ICD-10-CM

## 2020-11-30 RX ORDER — HYDRALAZINE HYDROCHLORIDE 50 MG/1
50 TABLET, FILM COATED ORAL 3 TIMES DAILY
Qty: 90 TABLET | Refills: 0 | Status: SHIPPED | OUTPATIENT
Start: 2020-11-30 | End: 2020-12-01 | Stop reason: SDUPTHER

## 2020-12-01 ENCOUNTER — OFFICE VISIT (OUTPATIENT)
Dept: INTERNAL MEDICINE | Facility: CLINIC | Age: 70
End: 2020-12-01

## 2020-12-01 VITALS
WEIGHT: 240 LBS | HEIGHT: 72 IN | DIASTOLIC BLOOD PRESSURE: 70 MMHG | HEART RATE: 82 BPM | SYSTOLIC BLOOD PRESSURE: 134 MMHG | BODY MASS INDEX: 32.51 KG/M2 | TEMPERATURE: 98.6 F | OXYGEN SATURATION: 98 %

## 2020-12-01 DIAGNOSIS — E11.21 TYPE 2 DIABETES WITH NEPHROPATHY (HCC): ICD-10-CM

## 2020-12-01 DIAGNOSIS — N18.30 STAGE 3 CHRONIC KIDNEY DISEASE, UNSPECIFIED WHETHER STAGE 3A OR 3B CKD (HCC): Primary | ICD-10-CM

## 2020-12-01 DIAGNOSIS — I10 ESSENTIAL HYPERTENSION: ICD-10-CM

## 2020-12-01 LAB
ALBUMIN SERPL-MCNC: 4.2 G/DL (ref 3.5–5.2)
ALBUMIN/GLOB SERPL: 1.6 G/DL
ALP SERPL-CCNC: 74 U/L (ref 39–117)
ALT SERPL-CCNC: 17 U/L (ref 1–41)
AST SERPL-CCNC: 17 U/L (ref 1–40)
BILIRUB SERPL-MCNC: 0.2 MG/DL (ref 0–1.2)
BUN SERPL-MCNC: 49 MG/DL (ref 8–23)
BUN/CREAT SERPL: 19.5 (ref 7–25)
CALCIUM SERPL-MCNC: 9.7 MG/DL (ref 8.6–10.5)
CHLORIDE SERPL-SCNC: 90 MMOL/L (ref 98–107)
CO2 SERPL-SCNC: 33 MMOL/L (ref 22–29)
CREAT SERPL-MCNC: 2.51 MG/DL (ref 0.76–1.27)
ERYTHROCYTE [DISTWIDTH] IN BLOOD BY AUTOMATED COUNT: 14.4 % (ref 12.3–15.4)
GLOBULIN SER CALC-MCNC: 2.6 GM/DL
GLUCOSE SERPL-MCNC: 212 MG/DL (ref 65–99)
HCT VFR BLD AUTO: 36.2 % (ref 37.5–51)
HGB BLD-MCNC: 11.5 G/DL (ref 13–17.7)
MCH RBC QN AUTO: 25.7 PG (ref 26.6–33)
MCHC RBC AUTO-ENTMCNC: 31.8 G/DL (ref 31.5–35.7)
MCV RBC AUTO: 80.8 FL (ref 79–97)
PLATELET # BLD AUTO: 281 10*3/MM3 (ref 140–450)
POTASSIUM SERPL-SCNC: 3.6 MMOL/L (ref 3.5–5.2)
PROT SERPL-MCNC: 6.8 G/DL (ref 6–8.5)
RBC # BLD AUTO: 4.48 10*6/MM3 (ref 4.14–5.8)
SODIUM SERPL-SCNC: 135 MMOL/L (ref 136–145)
WBC # BLD AUTO: 6.02 10*3/MM3 (ref 3.4–10.8)

## 2020-12-01 PROCEDURE — 99214 OFFICE O/P EST MOD 30 MIN: CPT | Performed by: FAMILY MEDICINE

## 2020-12-01 NOTE — PROGRESS NOTES
Subjective   Javier Torres is a 70 y.o. male.  Follow-up chronic kidney disease, hypertension, diabetes.    Diabetes  Pertinent negatives for diabetes include no chest pain and no fatigue.   Hypertension  Pertinent negatives include no chest pain, palpitations or shortness of breath.   Hyperlipidemia  Pertinent negatives include no chest pain or shortness of breath.        Chronic kidney disease- stable.   I have been following up-up and monitoring his Bumex.  His labs on October 29, 2020 showed a creatinine of 1.85 which was an increase from a week earlier at 1.75.  Today he needs repeat labs.  I had spoken with his nephrologist who agreed with the increase Bumex to 2 mg and he recently put him on metolazone 5mg daily and then up to 4 x daily as needed.      HTN is finally back to goal.  He is maxed on amlodipine and hydralazine.  He is taking the Bumex for diuresis as well as metolazone  Denies chest pain, shortness of breath, headache.    Diabetes mellitus-stable.  No new numbness, tingling.  Patient is taking Levemir and SSI as prescribed.  No side effects.  Last A1c was 7.80.  Eye exam is up-to-date.  Sees Dr. Meyers usually for this issue.  Due for a CMP but too early for a A1c.  He has eaten today but he is usually running about 159 at home in the mornings.  A little higher the last couple of days as he ate more through Thanksgiving.        The following portions of the patient's history were reviewed and updated as appropriate: allergies, current medications, past family history, past medical history, past social history, past surgical history and problem list.    Review of Systems   Constitutional: Negative for fatigue and fever.   Respiratory: Negative for shortness of breath and wheezing.    Cardiovascular: Positive for leg swelling. Negative for chest pain and palpitations.   Gastrointestinal: Negative for constipation and nausea.       Objective   Physical Exam  Vitals signs and nursing note reviewed.    Constitutional:       General: He is not in acute distress.     Appearance: He is well-developed. He is not diaphoretic.   Cardiovascular:      Rate and Rhythm: Normal rate and regular rhythm.      Heart sounds: Normal heart sounds. No murmur. No friction rub. No gallop.    Pulmonary:      Effort: Pulmonary effort is normal.      Breath sounds: Normal breath sounds. No wheezing or rales.   Musculoskeletal:      Right lower leg: Edema present.      Left lower leg: Edema present.      Comments: Swelling has improved from last time   Skin:     General: Skin is warm.      Capillary Refill: Capillary refill takes less than 2 seconds.     I have reviewed the HPI, review of systems, and physical exam and they are all accurate with the patient and for this encounter.      Assessment/Plan   Diagnoses and all orders for this visit:    1. Stage 3 chronic kidney disease, unspecified whether stage 3a or 3b CKD (Primary)  -     CBC (No Diff)  -     Comprehensive Metabolic Panel    2. Type 2 diabetes with nephropathy (CMS/HCC)  -     CBC (No Diff)  -     Comprehensive Metabolic Panel    3. Essential hypertension  -     CBC (No Diff)  -     Comprehensive Metabolic Panel    All 3 conditions appear stable or improved.  I am very pleased with his progress.  I may get some labs today to monitor his conditions as well as the medications that are treating them.  I understand that today the sugar will be a wash as he has eaten today but he did give me some data to use from his fingerstick glucoses.  So I am okay with this.  I will see him back for routine check in in about 3 months.  He will see his nephrologist in between that time and he is checking in frequently with his endocrinologist.

## 2020-12-01 NOTE — PATIENT INSTRUCTIONS
Chronic Kidney Disease, Adult  Chronic kidney disease (CKD) happens when the kidneys are damaged over a long period of time. The kidneys are two organs that help with:  · Getting rid of waste and extra fluid from the blood.  · Making hormones that maintain the amount of fluid in your tissues and blood vessels.  · Making sure that the body has the right amount of fluids and chemicals.  Most of the time, CKD does not go away, but it can usually be controlled. Steps must be taken to slow down the kidney damage or to stop it from getting worse. If this is not done, the kidneys may stop working.  Follow these instructions at home:  Medicines  · Take over-the-counter and prescription medicines only as told by your doctor. You may need to change the amount of medicines you take.  · Do not take any new medicines unless your doctor says it is okay. Many medicines can make your kidney damage worse.  · Do not take any vitamin and supplements unless your doctor says it is okay. Many vitamins and supplements can make your kidney damage worse.  General instructions  · Follow a diet as told by your doctor. You may need to stay away from:  ? Alcohol.  ? Salty foods.  ? Foods that are high in:  § Potassium.  § Calcium.  § Protein.  · Do not use any products that contain nicotine or tobacco, such as cigarettes and e-cigarettes. If you need help quitting, ask your doctor.  · Keep track of your blood pressure at home. Tell your doctor about any changes.  · If you have diabetes, keep track of your blood sugar as told by your doctor.  · Try to stay at a healthy weight. If you need help, ask your doctor.  · Exercise at least 30 minutes a day, 5 days a week.  · Stay up-to-date with your shots (immunizations) as told by your doctor.  · Keep all follow-up visits as told by your doctor. This is important.  Contact a doctor if:  · Your symptoms get worse.  · You have new symptoms.  Get help right away if:  · You have symptoms of end-stage  kidney disease. These may include:  ? Headaches.  ? Numbness in your hands or feet.  ? Easy bruising.  ? Having hiccups often.  ? Chest pain.  ? Shortness of breath.  ? Stopping of menstrual periods in women.  · You have a fever.  · You have very little pee (urine).  · You have pain or bleeding when you pee.  Summary  · Chronic kidney disease (CKD) happens when the kidneys are damaged over a long period of time.  · Most of the time, this condition does not go away, but it can usually be controlled. Steps must be taken to slow down the kidney damage or to stop it from getting worse.  · Treatment may include a combination of medicines and lifestyle changes.  This information is not intended to replace advice given to you by your health care provider. Make sure you discuss any questions you have with your health care provider.  Document Revised: 11/30/2018 Document Reviewed: 01/22/2018  Elsevier Patient Education © 2020 Elsevier Inc.

## 2020-12-04 ENCOUNTER — PATIENT OUTREACH (OUTPATIENT)
Dept: CASE MANAGEMENT | Facility: OTHER | Age: 70
End: 2020-12-04

## 2020-12-04 NOTE — OUTREACH NOTE
Care Coordination Note    Spoke with patient's POA. Patient is continually improving. Patient is scheduled to have PT/OT/SN once more each before Home Health is finished. Patient has followed up with his PCP and his renal doctor this week. Patient's renal medications were adjusted. No concerns or needs at this time. 24/7 Nurse Call Line provided to patient's POA [phone] for future concerns or needs of the patient.     Chanda Flores RN  Ambulatory     12/4/2020, 15:50 EST

## 2020-12-07 ENCOUNTER — TELEPHONE (OUTPATIENT)
Dept: INTERNAL MEDICINE | Facility: CLINIC | Age: 70
End: 2020-12-07

## 2020-12-07 NOTE — TELEPHONE ENCOUNTER
RANGEL HOOD REPORTS THE PLANNING CARE WAS COMPLETED.   PATIENT MET ALL HIS GOALS.      CALL BACK #: [phone]

## 2020-12-11 RX ORDER — BLOOD SUGAR DIAGNOSTIC
STRIP MISCELLANEOUS
Qty: 300 EACH | Refills: 2 | Status: SHIPPED | OUTPATIENT
Start: 2020-12-11 | End: 2021-11-15

## 2020-12-15 ENCOUNTER — TELEPHONE (OUTPATIENT)
Dept: INTERNAL MEDICINE | Facility: CLINIC | Age: 70
End: 2020-12-15

## 2020-12-15 NOTE — TELEPHONE ENCOUNTER
Tiffany with [facility] called to advise PCP patient is being discharged today. Fulfilled all requirements. Call back if any questions [phone].

## 2020-12-17 RX ORDER — HYDRALAZINE HYDROCHLORIDE 50 MG/1
TABLET, FILM COATED ORAL
Qty: 90 TABLET | Refills: 0 | Status: SHIPPED | OUTPATIENT
Start: 2020-12-17 | End: 2021-01-13 | Stop reason: SDUPTHER

## 2021-01-11 RX ORDER — HYDRALAZINE HYDROCHLORIDE 50 MG/1
50 TABLET, FILM COATED ORAL 3 TIMES DAILY
Qty: 90 TABLET | Refills: 0 | OUTPATIENT
Start: 2021-01-11

## 2021-01-13 RX ORDER — HYDRALAZINE HYDROCHLORIDE 50 MG/1
50 TABLET, FILM COATED ORAL 3 TIMES DAILY
Qty: 90 TABLET | Refills: 0 | Status: SHIPPED | OUTPATIENT
Start: 2021-01-13 | End: 2021-02-12

## 2021-01-13 NOTE — TELEPHONE ENCOUNTER
Caller: Nanda Gregory [POA/HCS/Cousin]    Relationship: Emergency Contact    Best call back number: [phone]     Medication needed:   Requested Prescriptions     Pending Prescriptions Disp Refills   • hydrALAZINE (APRESOLINE) 50 MG tablet 90 tablet 0     Sig: Take 1 tablet by mouth 3 (Three) Times a Day.       When do you need the refill by: SOON    What details did the patient provide when requesting the medication: NANDA STATES THAT THE PATIENT'S DR, DR KIRKPATRICK, WOULD LIKE FOR DR ZELAYA TO TAKE OVER PRESCRIBING THIS MEDICATION    Does the patient have less than a 3 day supply:  [x] Yes  [] No    What is the patient's preferred pharmacy: [facility] - [address] [phone] [facility] [phone] FX

## 2021-01-17 DIAGNOSIS — M10.9 GOUT OF FOOT, UNSPECIFIED CAUSE, UNSPECIFIED CHRONICITY, UNSPECIFIED LATERALITY: ICD-10-CM

## 2021-01-18 RX ORDER — RIVAROXABAN 20 MG/1
TABLET, FILM COATED ORAL
Qty: 90 TABLET | Refills: 1 | Status: SHIPPED | OUTPATIENT
Start: 2021-01-18 | End: 2021-07-22

## 2021-01-21 ENCOUNTER — OFFICE VISIT (OUTPATIENT)
Dept: FAMILY MEDICINE CLINIC | Facility: CLINIC | Age: 71
End: 2021-01-21

## 2021-01-21 VITALS
RESPIRATION RATE: 18 BRPM | TEMPERATURE: 97.3 F | HEIGHT: 72 IN | HEART RATE: 94 BPM | OXYGEN SATURATION: 99 % | BODY MASS INDEX: 32.86 KG/M2 | DIASTOLIC BLOOD PRESSURE: 78 MMHG | WEIGHT: 242.6 LBS | SYSTOLIC BLOOD PRESSURE: 138 MMHG

## 2021-01-21 DIAGNOSIS — N18.30 STAGE 3 CHRONIC KIDNEY DISEASE, UNSPECIFIED WHETHER STAGE 3A OR 3B CKD (HCC): ICD-10-CM

## 2021-01-21 DIAGNOSIS — E11.21 TYPE 2 DIABETES WITH NEPHROPATHY (HCC): ICD-10-CM

## 2021-01-21 DIAGNOSIS — E55.9 VITAMIN D DEFICIENCY: ICD-10-CM

## 2021-01-21 DIAGNOSIS — E78.5 HYPERLIPIDEMIA, UNSPECIFIED HYPERLIPIDEMIA TYPE: ICD-10-CM

## 2021-01-21 DIAGNOSIS — I10 ESSENTIAL HYPERTENSION: Primary | ICD-10-CM

## 2021-01-21 DIAGNOSIS — M10.9 GOUT, UNSPECIFIED CAUSE, UNSPECIFIED CHRONICITY, UNSPECIFIED SITE: ICD-10-CM

## 2021-01-21 PROCEDURE — 99214 OFFICE O/P EST MOD 30 MIN: CPT | Performed by: NURSE PRACTITIONER

## 2021-01-21 NOTE — PROGRESS NOTES
Subjective   Javier Torres is a 70 y.o. male.     Chief Complaint   Patient presents with   • Establish Care   • Follow-up     chronic kidney disease, hypertension, diabetes.   • Diabetes   • Hypertension        Vitals:    01/21/21 0920   BP: 138/78   Pulse: 94   Resp: 18   Temp: 97.3 °F (36.3 °C)   SpO2: 99%        History of Present Illness   This is a 70-year-old patient here for follow-up for CKD, hypertension, diabetes mellitus.    Diabetes-stable.  Patient denies any numbness or tingling.  He is currently on Levemir and sliding scale insulin.  He is up-to-date on his eye exam and follows Dr. Meyers we will check a hemoglobin A1c today.  He reports blood sugars run in the 150s at home.    Hypertension-stable.  He reports blood pressures at home are running in the 120s to 130s over 70s.  He is currently taking amlodipine, hydralazine and now Bumex with metolazone.  He denies chest pain or headache.    Chronic kidney disease-patient is being followed by Dr. Tena.  Most recent creatinine was 2.51 which is elevated from previous.  He reports his weight is stable with current dose of Bumex and metolazone.  He reports weight at home is usually 234.  His weight is stable from his last visit with us.    The following portions of the patient's history were reviewed and updated as appropriate: allergies, current medications, past family history, past medical history, past social history, past surgical history, and problem list.    Review of Systems   Constitutional: Negative for activity change, appetite change and fatigue.   HENT: Negative for congestion and nosebleeds.    Respiratory: Negative for apnea, chest tightness and shortness of breath.    Cardiovascular: Positive for leg swelling. Negative for chest pain.   Gastrointestinal: Negative for abdominal distention and abdominal pain.   Neurological: Negative for dizziness and numbness.       Objective   Physical Exam  Vitals signs and nursing note reviewed. Exam  conducted with a chaperone present.   Constitutional:       Appearance: Normal appearance.   HENT:      Head: Normocephalic.      Nose: Nose normal.      Mouth/Throat:      Mouth: Mucous membranes are moist.   Eyes:      Pupils: Pupils are equal, round, and reactive to light.   Neck:      Musculoskeletal: Neck supple.   Cardiovascular:      Rate and Rhythm: Normal rate and regular rhythm.      Pulses: Normal pulses.      Heart sounds: Normal heart sounds.   Pulmonary:      Effort: Pulmonary effort is normal. No respiratory distress.      Breath sounds: Normal breath sounds. No wheezing or rales.   Abdominal:      General: Bowel sounds are normal. There is no distension.      Palpations: Abdomen is soft.      Tenderness: There is no abdominal tenderness.   Musculoskeletal: Normal range of motion.         General: No swelling.      Right lower leg: Edema present.      Left lower leg: Edema present.   Skin:     General: Skin is warm and dry.   Neurological:      Mental Status: He is alert and oriented to person, place, and time.   Psychiatric:         Mood and Affect: Mood normal.         Behavior: Behavior normal.           Assessment/Plan   Problems Addressed this Visit        Cardiac and Vasculature    Essential hypertension - Primary    Relevant Orders    Comprehensive metabolic panel    Hyperlipidemia    Relevant Orders    Lipid panel       Endocrine and Metabolic    Type 2 diabetes with nephropathy (CMS/HCC)    Relevant Orders    Hemoglobin A1c    Vitamin D deficiency    Relevant Orders    Vitamin D 25 hydroxy       Genitourinary and Reproductive     Chronic kidney disease, stage III (moderate) (CMS/HCC)    Relevant Orders    CBC w AUTO Differential    Urinalysis With Microscopic - Urine, Clean Catch    Protein / Creatinine Ratio, Urine - Urine, Clean Catch    PTH, Intact       Musculoskeletal and Injuries    Gout    Relevant Orders    Uric acid      Diagnoses       Codes Comments    Essential hypertension    -   Primary ICD-10-CM: I10  ICD-9-CM: 401.9     Hyperlipidemia, unspecified hyperlipidemia type     ICD-10-CM: E78.5  ICD-9-CM: 272.4     Type 2 diabetes with nephropathy (CMS/[facility])     ICD-10-CM: E11.21  ICD-9-CM: 250.40, 583.81     Stage 3 chronic kidney disease, unspecified whether stage 3a or 3b CKD     ICD-10-CM: N18.30  ICD-9-CM: 585.3     Vitamin D deficiency     ICD-10-CM: E55.9  ICD-9-CM: 268.9     Gout, unspecified cause, unspecified chronicity, unspecified site     ICD-10-CM: M10.9  ICD-9-CM: 274.9         I will obtain lab work today.  Patient will continue all current medications as ordered.  He will follow up with me in 3 months or sooner if needed    Spent 30 minutes total with patient

## 2021-01-22 LAB
25(OH)D3+25(OH)D2 SERPL-MCNC: 39.1 NG/ML (ref 30–100)
ALBUMIN SERPL-MCNC: 4.4 G/DL (ref 3.7–4.7)
ALBUMIN/GLOB SERPL: 1.6 {RATIO} (ref 1.2–2.2)
ALP SERPL-CCNC: 82 IU/L (ref 39–117)
ALT SERPL-CCNC: 20 IU/L (ref 0–44)
APPEARANCE UR: CLEAR
AST SERPL-CCNC: 23 IU/L (ref 0–40)
BACTERIA #/AREA URNS HPF: NORMAL /[HPF]
BASOPHILS # BLD AUTO: 0.1 X10E3/UL (ref 0–0.2)
BASOPHILS NFR BLD AUTO: 1 %
BILIRUB SERPL-MCNC: 0.4 MG/DL (ref 0–1.2)
BILIRUB UR QL STRIP: NEGATIVE
BUN SERPL-MCNC: 50 MG/DL (ref 8–27)
BUN/CREAT SERPL: 21 (ref 10–24)
CALCIUM SERPL-MCNC: 10 MG/DL (ref 8.6–10.2)
CHLORIDE SERPL-SCNC: 92 MMOL/L (ref 96–106)
CHOLEST SERPL-MCNC: 175 MG/DL (ref 100–199)
CO2 SERPL-SCNC: 28 MMOL/L (ref 20–29)
COLOR UR: YELLOW
CREAT SERPL-MCNC: 2.36 MG/DL (ref 0.76–1.27)
CREAT UR-MCNC: 48.4 MG/DL
EOSINOPHIL # BLD AUTO: 0.7 X10E3/UL (ref 0–0.4)
EOSINOPHIL NFR BLD AUTO: 11 %
EPI CELLS #/AREA URNS HPF: NORMAL /HPF (ref 0–10)
ERYTHROCYTE [DISTWIDTH] IN BLOOD BY AUTOMATED COUNT: 13.7 % (ref 11.6–15.4)
GLOBULIN SER CALC-MCNC: 2.8 G/DL (ref 1.5–4.5)
GLUCOSE SERPL-MCNC: 171 MG/DL (ref 65–99)
GLUCOSE UR QL: ABNORMAL
HBA1C MFR BLD: 10.8 % (ref 4.8–5.6)
HCT VFR BLD AUTO: 38.3 % (ref 37.5–51)
HDLC SERPL-MCNC: 45 MG/DL
HGB BLD-MCNC: 12.2 G/DL (ref 13–17.7)
HGB UR QL STRIP: NEGATIVE
IMM GRANULOCYTES # BLD AUTO: 0 X10E3/UL (ref 0–0.1)
IMM GRANULOCYTES NFR BLD AUTO: 0 %
KETONES UR QL STRIP: NEGATIVE
LDLC SERPL CALC-MCNC: 96 MG/DL (ref 0–99)
LEUKOCYTE ESTERASE UR QL STRIP: NEGATIVE
LYMPHOCYTES # BLD AUTO: 1.5 X10E3/UL (ref 0.7–3.1)
LYMPHOCYTES NFR BLD AUTO: 23 %
MCH RBC QN AUTO: 25.3 PG (ref 26.6–33)
MCHC RBC AUTO-ENTMCNC: 31.9 G/DL (ref 31.5–35.7)
MCV RBC AUTO: 79 FL (ref 79–97)
MICRO URNS: ABNORMAL
MICRO URNS: ABNORMAL
MONOCYTES # BLD AUTO: 0.8 X10E3/UL (ref 0.1–0.9)
MONOCYTES NFR BLD AUTO: 12 %
NEUTROPHILS # BLD AUTO: 3.4 X10E3/UL (ref 1.4–7)
NEUTROPHILS NFR BLD AUTO: 53 %
NITRITE UR QL STRIP: NEGATIVE
PH UR STRIP: 6.5 [PH] (ref 5–7.5)
PLATELET # BLD AUTO: 303 X10E3/UL (ref 150–450)
POTASSIUM SERPL-SCNC: 3.8 MMOL/L (ref 3.5–5.2)
PROT SERPL-MCNC: 7.2 G/DL (ref 6–8.5)
PROT UR QL STRIP: NEGATIVE
PROT UR-MCNC: 9 MG/DL
PROT/CREAT UR: 186 MG/G CREAT (ref 0–200)
PTH-INTACT SERPL-MCNC: 45 PG/ML (ref 15–65)
RBC # BLD AUTO: 4.83 X10E6/UL (ref 4.14–5.8)
RBC #/AREA URNS HPF: NORMAL /HPF (ref 0–2)
SODIUM SERPL-SCNC: 138 MMOL/L (ref 134–144)
SP GR UR: 1.01 (ref 1–1.03)
TRIGL SERPL-MCNC: 200 MG/DL (ref 0–149)
URATE SERPL-MCNC: 9 MG/DL (ref 3.8–8.4)
UROBILINOGEN UR STRIP-MCNC: 0.2 MG/DL (ref 0.2–1)
VLDLC SERPL CALC-MCNC: 34 MG/DL (ref 5–40)
WBC # BLD AUTO: 6.5 X10E3/UL (ref 3.4–10.8)
WBC #/AREA URNS HPF: NORMAL /HPF (ref 0–5)

## 2021-02-12 RX ORDER — HYDRALAZINE HYDROCHLORIDE 50 MG/1
TABLET, FILM COATED ORAL
Qty: 90 TABLET | Refills: 0 | Status: SHIPPED | OUTPATIENT
Start: 2021-02-12 | End: 2021-02-12 | Stop reason: SDUPTHER

## 2021-02-12 RX ORDER — HYDRALAZINE HYDROCHLORIDE 50 MG/1
50 TABLET, FILM COATED ORAL 3 TIMES DAILY
Qty: 90 TABLET | Refills: 0 | OUTPATIENT
Start: 2021-02-12

## 2021-02-12 NOTE — TELEPHONE ENCOUNTER
Caller: Ccoo Gregory [POA/HCS/Cousin]    Relationship: Emergency Contact    Best call back number: [phone]    Medication needed:   Requested Prescriptions     Pending Prescriptions Disp Refills   • hydrALAZINE (APRESOLINE) 50 MG tablet 90 tablet 0       When do you need the refill by: 2/13/2021    What details did the patient provide when requesting the medication: PATIENT HAS 2 DOSES LEFT.     Does the patient have less than a 3 day supply:  [x] Yes  [] No    What is the patient's preferred pharmacy: [facility] - [place], [place] - [address] [place] [address] [phone] [facility] [phone]

## 2021-02-15 RX ORDER — FAMOTIDINE 20 MG/1
TABLET, FILM COATED ORAL
Qty: 180 TABLET | Refills: 3 | Status: SHIPPED | OUTPATIENT
Start: 2021-02-15 | End: 2022-02-14 | Stop reason: SDUPTHER

## 2021-02-15 RX ORDER — HYDRALAZINE HYDROCHLORIDE 50 MG/1
TABLET, FILM COATED ORAL
Qty: 90 TABLET | Refills: 0 | Status: SHIPPED | OUTPATIENT
Start: 2021-02-15 | End: 2021-03-16

## 2021-02-22 DIAGNOSIS — M10.9 GOUT OF FOOT, UNSPECIFIED CAUSE, UNSPECIFIED CHRONICITY, UNSPECIFIED LATERALITY: ICD-10-CM

## 2021-02-22 DIAGNOSIS — E11.21 TYPE 2 DIABETES WITH NEPHROPATHY (HCC): ICD-10-CM

## 2021-02-23 RX ORDER — ATORVASTATIN CALCIUM 20 MG/1
20 TABLET, FILM COATED ORAL
Qty: 90 TABLET | Refills: 1 | Status: SHIPPED | OUTPATIENT
Start: 2021-02-23 | End: 2021-08-16

## 2021-02-25 RX ORDER — INSULIN DETEMIR 100 [IU]/ML
70 INJECTION, SOLUTION SUBCUTANEOUS NIGHTLY
Qty: 60 ML | Refills: 1 | Status: SHIPPED | OUTPATIENT
Start: 2021-02-25 | End: 2021-08-26

## 2021-02-28 DIAGNOSIS — I10 ESSENTIAL HYPERTENSION: ICD-10-CM

## 2021-03-01 RX ORDER — AMLODIPINE BESYLATE 10 MG/1
10 TABLET ORAL DAILY
Qty: 90 TABLET | Refills: 3 | Status: SHIPPED | OUTPATIENT
Start: 2021-03-01 | End: 2022-01-25 | Stop reason: SDUPTHER

## 2021-03-03 ENCOUNTER — TELEPHONE (OUTPATIENT)
Dept: FAMILY MEDICINE CLINIC | Facility: CLINIC | Age: 71
End: 2021-03-03

## 2021-03-03 NOTE — TELEPHONE ENCOUNTER
ROSEMARY FROM DR. GREEN OFFICE CALLED TO DISCUSS LABS FOR THE PATIENT AND INFORMING THAT SHE WAS GOING TO GO AHEAD AND SEND OVER HIS ORDER TO HAVE DONE IN THEIR OFFICE. ATTEMPTED TO WARM TRANSFER.

## 2021-03-16 ENCOUNTER — TELEPHONE (OUTPATIENT)
Dept: FAMILY MEDICINE CLINIC | Facility: CLINIC | Age: 71
End: 2021-03-16

## 2021-03-16 RX ORDER — HYDRALAZINE HYDROCHLORIDE 50 MG/1
TABLET, FILM COATED ORAL
Qty: 90 TABLET | Refills: 0 | Status: SHIPPED | OUTPATIENT
Start: 2021-03-16 | End: 2021-05-18

## 2021-03-16 NOTE — TELEPHONE ENCOUNTER
Patient's wife called to request last labs results to be faxed to Dr Blake Bravo's office @ [phone]  Patient has appt on Thursday 03/18

## 2021-03-18 DIAGNOSIS — R60.0 EDEMA OF LOWER EXTREMITY: ICD-10-CM

## 2021-03-18 DIAGNOSIS — N18.30 STAGE 3 CHRONIC KIDNEY DISEASE, UNSPECIFIED WHETHER STAGE 3A OR 3B CKD (HCC): ICD-10-CM

## 2021-03-19 RX ORDER — BUMETANIDE 2 MG/1
TABLET ORAL
Qty: 30 TABLET | Refills: 3 | Status: SHIPPED | OUTPATIENT
Start: 2021-03-19 | End: 2021-07-22

## 2021-04-16 ENCOUNTER — OFFICE VISIT (OUTPATIENT)
Dept: FAMILY MEDICINE CLINIC | Facility: CLINIC | Age: 71
End: 2021-04-16

## 2021-04-16 VITALS
BODY MASS INDEX: 32.56 KG/M2 | SYSTOLIC BLOOD PRESSURE: 132 MMHG | HEART RATE: 75 BPM | WEIGHT: 240.4 LBS | RESPIRATION RATE: 18 BRPM | DIASTOLIC BLOOD PRESSURE: 76 MMHG | TEMPERATURE: 98.6 F | OXYGEN SATURATION: 98 % | HEIGHT: 72 IN

## 2021-04-16 DIAGNOSIS — E78.5 HYPERLIPIDEMIA, UNSPECIFIED HYPERLIPIDEMIA TYPE: Primary | ICD-10-CM

## 2021-04-16 DIAGNOSIS — E11.21 TYPE 2 DIABETES WITH NEPHROPATHY (HCC): ICD-10-CM

## 2021-04-16 DIAGNOSIS — N18.30 STAGE 3 CHRONIC KIDNEY DISEASE, UNSPECIFIED WHETHER STAGE 3A OR 3B CKD (HCC): ICD-10-CM

## 2021-04-16 DIAGNOSIS — I10 ESSENTIAL HYPERTENSION: ICD-10-CM

## 2021-04-16 DIAGNOSIS — E55.9 VITAMIN D DEFICIENCY: ICD-10-CM

## 2021-04-16 PROCEDURE — 99213 OFFICE O/P EST LOW 20 MIN: CPT | Performed by: NURSE PRACTITIONER

## 2021-04-16 RX ORDER — ALLOPURINOL 100 MG/1
TABLET ORAL
COMMUNITY
Start: 2021-03-24 | End: 2021-06-24

## 2021-04-16 RX ORDER — COLCHICINE 0.6 MG/1
TABLET, FILM COATED ORAL
COMMUNITY
Start: 2021-01-25 | End: 2022-03-31

## 2021-04-16 NOTE — PROGRESS NOTES
"Chief Complaint  Essential hypertension and Hyperlipidemia    Subjective          Javier Torres presents to [facility]  History of Present Illness   This is a 71-year-old male patient being seen today for CKD, hypertension, diabetes mellitus.    CKD-his last creatinine in January was 2.36.  He is currently being followed by Dr. Tena.  He is currently on metolazone and torsemide.  He reports his weights are stable.  He has recently been dealing with gout and was placed on cocryx as needed.      Hypertension-stable.  His blood pressure is stable today.  He is currently on Norvasc 10 mg daily hydralazine 50 mg 3 times a day.  He denies chest pain lightheadedness or dizziness.    Diabetes mellitus-uncontrolled.  His blood sugar readings are elevated today.  He states he has been trying to get in with his diabetic doctor and had to wait at last visit so he left.  He is currently on Levemir 70 units at night and 20 units 3 times a day.  Last hemoglobin A1c in January was 10.8.        Objective   Vital Signs:   /76 (BP Location: Left arm, Patient Position: Sitting, Cuff Size: Large Adult)   Pulse 75   Temp 98.6 °F (37 °C) (Temporal)   Resp 18   Ht 182.9 cm (72.01\")   Wt 109 kg (240 lb 6.4 oz)   SpO2 98%   BMI 32.60 kg/m²     Physical Exam  Vitals and nursing note reviewed.   HENT:      Head: Normocephalic.      Nose: Nose normal.   Eyes:      Pupils: Pupils are equal, round, and reactive to light.   Cardiovascular:      Rate and Rhythm: Normal rate and regular rhythm.      Pulses: Normal pulses.      Heart sounds: Normal heart sounds.   Pulmonary:      Effort: Pulmonary effort is normal. No respiratory distress.      Breath sounds: Normal breath sounds. No wheezing or rales.   Abdominal:      General: Bowel sounds are normal. There is no distension.      Tenderness: There is no abdominal tenderness.   Musculoskeletal:         General: No swelling.      Cervical back: Neck supple. "      Right lower leg: No edema.      Left lower leg: No edema.   Skin:     General: Skin is warm and dry.      Capillary Refill: Capillary refill takes less than 2 seconds.   Neurological:      Mental Status: He is alert and oriented to person, place, and time.   Psychiatric:         Mood and Affect: Mood normal.      Diabetic foot exam:   Left: Filament test present   Pulses Dorsalis Pedis:  present  Posterior Tibial:  present   Reflexes 2+    Vibratory sensation normal   Proprioception normal   Sharp/dull discrimination normal       Right: Filament test present   Pulses Dorsalis Pedis:  present  Posterior Tibial:  present   Reflexes 2+    Vibratory sensation normal   Proprioception normal   Sharp/dull discrimination normal    Result Review :                 Assessment and Plan    Diagnoses and all orders for this visit:    1. Hyperlipidemia, unspecified hyperlipidemia type (Primary)  -     Lipid panel  -     Comprehensive metabolic panel    2. Essential hypertension    3. Type 2 diabetes with nephropathy (CMS/HCC)  -     Hemoglobin A1c  -     MicroAlbumin, Urine, Random - Urine, Clean Catch    4. Vitamin D deficiency    5. Stage 3 chronic kidney disease, unspecified whether stage 3a or 3b CKD (CMS/HCC)  -     CBC w AUTO Differential  -     Uric acid    I will check lab work today.  We will continue current regimen for now    Follow Up   Return in about 6 months (around 10/16/2021).  Patient was given instructions and counseling regarding his condition or for health maintenance advice. Please see specific information pulled into the AVS if appropriate.

## 2021-04-17 LAB
ALBUMIN SERPL-MCNC: 4.5 G/DL (ref 3.5–5.2)
ALBUMIN/GLOB SERPL: 2 G/DL
ALP SERPL-CCNC: 78 U/L (ref 39–117)
ALT SERPL-CCNC: 18 U/L (ref 1–41)
AST SERPL-CCNC: 19 U/L (ref 1–40)
BASOPHILS # BLD AUTO: 0.08 10*3/MM3 (ref 0–0.2)
BASOPHILS NFR BLD AUTO: 1.2 % (ref 0–1.5)
BILIRUB SERPL-MCNC: 0.4 MG/DL (ref 0–1.2)
BUN SERPL-MCNC: 59 MG/DL (ref 8–23)
BUN/CREAT SERPL: 23 (ref 7–25)
CALCIUM SERPL-MCNC: 10.3 MG/DL (ref 8.6–10.5)
CHLORIDE SERPL-SCNC: 91 MMOL/L (ref 98–107)
CHOLEST SERPL-MCNC: 162 MG/DL (ref 0–200)
CO2 SERPL-SCNC: 30 MMOL/L (ref 22–29)
CREAT SERPL-MCNC: 2.57 MG/DL (ref 0.76–1.27)
EOSINOPHIL # BLD AUTO: 0.46 10*3/MM3 (ref 0–0.4)
EOSINOPHIL NFR BLD AUTO: 6.9 % (ref 0.3–6.2)
ERYTHROCYTE [DISTWIDTH] IN BLOOD BY AUTOMATED COUNT: 14.7 % (ref 12.3–15.4)
GLOBULIN SER CALC-MCNC: 2.3 GM/DL
GLUCOSE SERPL-MCNC: 188 MG/DL (ref 65–99)
HBA1C MFR BLD: 10.5 % (ref 4.8–5.6)
HCT VFR BLD AUTO: 34.5 % (ref 37.5–51)
HDLC SERPL-MCNC: 53 MG/DL (ref 40–60)
HGB BLD-MCNC: 10.8 G/DL (ref 13–17.7)
IMM GRANULOCYTES # BLD AUTO: 0.02 10*3/MM3 (ref 0–0.05)
IMM GRANULOCYTES NFR BLD AUTO: 0.3 % (ref 0–0.5)
LDLC SERPL CALC-MCNC: 86 MG/DL (ref 0–100)
LYMPHOCYTES # BLD AUTO: 1.42 10*3/MM3 (ref 0.7–3.1)
LYMPHOCYTES NFR BLD AUTO: 21.4 % (ref 19.6–45.3)
MCH RBC QN AUTO: 24.2 PG (ref 26.6–33)
MCHC RBC AUTO-ENTMCNC: 31.3 G/DL (ref 31.5–35.7)
MCV RBC AUTO: 77.2 FL (ref 79–97)
MONOCYTES # BLD AUTO: 0.67 10*3/MM3 (ref 0.1–0.9)
MONOCYTES NFR BLD AUTO: 10.1 % (ref 5–12)
NEUTROPHILS # BLD AUTO: 4 10*3/MM3 (ref 1.7–7)
NEUTROPHILS NFR BLD AUTO: 60.1 % (ref 42.7–76)
NRBC BLD AUTO-RTO: 0 /100 WBC (ref 0–0.2)
PLATELET # BLD AUTO: 390 10*3/MM3 (ref 140–450)
POTASSIUM SERPL-SCNC: 3.8 MMOL/L (ref 3.5–5.2)
PROT SERPL-MCNC: 6.8 G/DL (ref 6–8.5)
RBC # BLD AUTO: 4.47 10*6/MM3 (ref 4.14–5.8)
SODIUM SERPL-SCNC: 136 MMOL/L (ref 136–145)
TRIGL SERPL-MCNC: 132 MG/DL (ref 0–150)
URATE SERPL-MCNC: 7.3 MG/DL (ref 3.4–7)
VLDLC SERPL CALC-MCNC: 23 MG/DL (ref 5–40)
WBC # BLD AUTO: 6.65 10*3/MM3 (ref 3.4–10.8)

## 2021-04-27 ENCOUNTER — TELEPHONE (OUTPATIENT)
Dept: ENDOCRINOLOGY | Age: 71
End: 2021-04-27

## 2021-04-27 NOTE — TELEPHONE ENCOUNTER
Coco barnes called in for follow up appt however dr riddle doesn't have anything available until dec they had to leave without being seen on 4/8 due to dr janessa mccray late and they had another appt that day and we have no other appts to givr can we please find a spot to place this patient in doesn't want to wait until December to see dr again

## 2021-04-30 DIAGNOSIS — D64.9 ANEMIA, UNSPECIFIED TYPE: Primary | ICD-10-CM

## 2021-04-30 LAB
BASOPHILS # BLD AUTO: 0.06 10*3/MM3 (ref 0–0.2)
BASOPHILS NFR BLD AUTO: 0.8 % (ref 0–1.5)
EOSINOPHIL # BLD AUTO: 0.47 10*3/MM3 (ref 0–0.4)
EOSINOPHIL NFR BLD AUTO: 6.4 % (ref 0.3–6.2)
ERYTHROCYTE [DISTWIDTH] IN BLOOD BY AUTOMATED COUNT: 14.6 % (ref 12.3–15.4)
HCT VFR BLD AUTO: 34 % (ref 37.5–51)
HGB BLD-MCNC: 10.4 G/DL (ref 13–17.7)
IMM GRANULOCYTES # BLD AUTO: 0.03 10*3/MM3 (ref 0–0.05)
IMM GRANULOCYTES NFR BLD AUTO: 0.4 % (ref 0–0.5)
IRON SATN MFR SERPL: 6 % (ref 20–50)
IRON SERPL-MCNC: 26 MCG/DL (ref 59–158)
LYMPHOCYTES # BLD AUTO: 1.32 10*3/MM3 (ref 0.7–3.1)
LYMPHOCYTES NFR BLD AUTO: 17.9 % (ref 19.6–45.3)
MCH RBC QN AUTO: 24.7 PG (ref 26.6–33)
MCHC RBC AUTO-ENTMCNC: 30.6 G/DL (ref 31.5–35.7)
MCV RBC AUTO: 80.8 FL (ref 79–97)
MONOCYTES # BLD AUTO: 0.78 10*3/MM3 (ref 0.1–0.9)
MONOCYTES NFR BLD AUTO: 10.6 % (ref 5–12)
NEUTROPHILS # BLD AUTO: 4.72 10*3/MM3 (ref 1.7–7)
NEUTROPHILS NFR BLD AUTO: 63.9 % (ref 42.7–76)
NRBC BLD AUTO-RTO: 0 /100 WBC (ref 0–0.2)
PLATELET # BLD AUTO: 343 10*3/MM3 (ref 140–450)
RBC # BLD AUTO: 4.21 10*6/MM3 (ref 4.14–5.8)
TIBC SERPL-MCNC: 447 MCG/DL
UIBC SERPL-MCNC: 421 MCG/DL (ref 112–346)
WBC # BLD AUTO: 7.38 10*3/MM3 (ref 3.4–10.8)

## 2021-05-15 RX ORDER — INSULIN ASPART 100 [IU]/ML
INJECTION, SOLUTION INTRAVENOUS; SUBCUTANEOUS
Qty: 45 ML | Refills: 0 | Status: SHIPPED | OUTPATIENT
Start: 2021-05-15 | End: 2021-10-25

## 2021-05-18 RX ORDER — HYDRALAZINE HYDROCHLORIDE 50 MG/1
TABLET, FILM COATED ORAL
Qty: 90 TABLET | Refills: 0 | Status: SHIPPED | OUTPATIENT
Start: 2021-05-18 | End: 2021-06-18

## 2021-06-17 NOTE — PROGRESS NOTES
Subjective   Javier Torres is a 71 y.o. male.     F/u for dm 2, hypertension, hyperlipidemia, CKD, vitamin d def / testing bs 3  X day / last dm eye exam 6/2020 with dr Srivastava  / last dm foot exam today with dr Meyers            Patient has known diabetes mellitus for many years. He was started on insulin in 2014. He has been on Levemir 70 units at bedtime, and Humalog 20 units with each meal.    Fasting glucose .  Lunchtime glucose 103-263.  Suppertime glucose 183-328.  He denies any severe hypoglycemic episodes.  He eats mostly TV dinners at home.  [email or web address].  He has  gained 3 pounds since December 2020.   He lives by himself and a cousin lives across the street.  Hemoglobin A1c done in April 2021 is 10.5%.      His last eye examination was in 6/20.   He has no history of retinopathy. He has chronic kidney disease and vitamin D deficiency and last saw Dr. Rubin in 1/20.  Urine microalbumin is normal on February 24, 2020.  He is on vitamin D 2000 units daily.  He denies numbness, tingling, or burning sensation in his feet.      He has hyperlipidemia and has been on Lipitor 20 mg once a day. He denies any myalgia. He has no history of thyroid disease.       He has hypertension and is on Norvasc 10 mg once a day, hydralazine 25 mg 4 times a day, metolazone and Bumex.  He was taken off lisinopril because of hyperkalemia.  He has no previous history of heart attack or stroke.  He denies chest pain, shortness of breath or pedal edema.  Blood pressure at home runs between 116-180.     He is sleeping well with a CPAP.  He wakes up rested.    He has history of gout.  He is on allopurinol 300 mg once a day and colchicine as needed prescribed by Dr. Rubin.      The following portions of the patient's history were reviewed and updated as appropriate: allergies, current medications, past family history, past medical history, past social history, past surgical history  "and problem list.    Review of Systems   Respiratory: Negative.    Cardiovascular: Positive for leg swelling. Negative for chest pain and palpitations.   Gastrointestinal: Negative.    Endocrine: Negative.    Genitourinary: Negative.    Musculoskeletal: Negative for myalgias.   Neurological: Negative for numbness.     Objective      Vitals:    06/24/21 1607   BP: 134/72   Pulse: 98   SpO2: 98%   Weight: 110 kg (243 lb 9.6 oz)   Height: 177.8 cm (70\")     Physical Exam  Constitutional:       General: He is not in acute distress.     Appearance: Normal appearance. He is obese. He is not ill-appearing, toxic-appearing or diaphoretic.   Eyes:      General: No scleral icterus.        Right eye: No discharge.         Left eye: No discharge.      Extraocular Movements: Extraocular movements intact.      Conjunctiva/sclera: Conjunctivae normal.   Cardiovascular:      Rate and Rhythm: Normal rate and regular rhythm.      Heart sounds: Normal heart sounds. No murmur heard.   No friction rub. No gallop.    Pulmonary:      Effort: No respiratory distress.      Breath sounds: Normal breath sounds. No stridor. No wheezing, rhonchi or rales.   Chest:      Chest wall: No tenderness.   Abdominal:      General: Bowel sounds are normal. There is no distension.      Palpations: Abdomen is soft. There is no mass.      Tenderness: There is no right CVA tenderness or left CVA tenderness.   Musculoskeletal:         General: Normal range of motion.      Right lower leg: Edema present.      Left lower leg: Edema present.   Skin:     General: Skin is warm and dry.   Neurological:      General: No focal deficit present.      Mental Status: He is alert and oriented to person, place, and time.   Psychiatric:         Behavior: Behavior normal.       Orders Only on 04/30/2021   Component Date Value Ref Range Status   • WBC 04/30/2021 7.38  3.40 - 10.80 10*3/mm3 Final   • RBC 04/30/2021 4.21  4.14 - 5.80 10*6/mm3 Final   • Hemoglobin 04/30/2021 " 10.4* 13.0 - 17.7 g/dL Final   • Hematocrit 04/30/2021 34.0* 37.5 - 51.0 % Final   • MCV 04/30/2021 80.8  79.0 - 97.0 fL Final   • MCH 04/30/2021 24.7* 26.6 - 33.0 pg Final   • MCHC 04/30/2021 30.6* 31.5 - 35.7 g/dL Final   • RDW 04/30/2021 14.6  12.3 - 15.4 % Final   • Platelets 04/30/2021 343  140 - 450 10*3/mm3 Final   • Neutrophil Rel % 04/30/2021 63.9  42.7 - 76.0 % Final   • Lymphocyte Rel % 04/30/2021 17.9* 19.6 - 45.3 % Final   • Monocyte Rel % 04/30/2021 10.6  5.0 - 12.0 % Final   • Eosinophil Rel % 04/30/2021 6.4* 0.3 - 6.2 % Final   • Basophil Rel % 04/30/2021 0.8  0.0 - 1.5 % Final   • Neutrophils Absolute 04/30/2021 4.72  1.70 - 7.00 10*3/mm3 Final   • Lymphocytes Absolute 04/30/2021 1.32  0.70 - 3.10 10*3/mm3 Final   • Monocytes Absolute 04/30/2021 0.78  0.10 - 0.90 10*3/mm3 Final   • Eosinophils Absolute 04/30/2021 0.47* 0.00 - 0.40 10*3/mm3 Final   • Basophils Absolute 04/30/2021 0.06  0.00 - 0.20 10*3/mm3 Final   • Immature Granulocyte Rel % 04/30/2021 0.4  0.0 - 0.5 % Final   • Immature Grans Absolute 04/30/2021 0.03  0.00 - 0.05 10*3/mm3 Final   • nRBC 04/30/2021 0.0  0.0 - 0.2 /100 WBC Final   • TIBC 04/30/2021 447  mcg/dL Final   • UIBC 04/30/2021 421* 112 - 346 mcg/dL Final   • Iron 04/30/2021 26* 59 - 158 mcg/dL Final   • Iron Saturation 04/30/2021 6* 20 - 50 % Final     Assessment/Plan   Diagnoses and all orders for this visit:    1. Type 2 diabetes with nephropathy (CMS/HCC) (Primary)    2. Essential hypertension    3. Hyperlipidemia, unspecified hyperlipidemia type    4. Vitamin D deficiency    5. Obstructive sleep apnea syndrome      Continue Levemir and Humalog.  Continue vitamin D3   Start ferrous gluconate 324 mg once a day.  Follow-up with PCP in 4 to 6 weeks with regards anemia.  Continue Lipitor 20 mg/day.  Will defer blood pressure control to nephrologist and PCP.  Continue CPAP.    RTC 4 mos.     Copy of my note sent to Ileana Wolf NP and Dr. Rubin

## 2021-06-18 RX ORDER — HYDRALAZINE HYDROCHLORIDE 50 MG/1
TABLET, FILM COATED ORAL
Qty: 90 TABLET | Refills: 3 | Status: SHIPPED | OUTPATIENT
Start: 2021-06-18 | End: 2021-10-11

## 2021-06-24 ENCOUNTER — OFFICE VISIT (OUTPATIENT)
Dept: ENDOCRINOLOGY | Age: 71
End: 2021-06-24

## 2021-06-24 VITALS
HEIGHT: 70 IN | WEIGHT: 243.6 LBS | HEART RATE: 98 BPM | DIASTOLIC BLOOD PRESSURE: 72 MMHG | SYSTOLIC BLOOD PRESSURE: 134 MMHG | OXYGEN SATURATION: 98 % | BODY MASS INDEX: 34.88 KG/M2

## 2021-06-24 DIAGNOSIS — G47.33 OBSTRUCTIVE SLEEP APNEA SYNDROME: ICD-10-CM

## 2021-06-24 DIAGNOSIS — E55.9 VITAMIN D DEFICIENCY: ICD-10-CM

## 2021-06-24 DIAGNOSIS — E78.5 HYPERLIPIDEMIA, UNSPECIFIED HYPERLIPIDEMIA TYPE: ICD-10-CM

## 2021-06-24 DIAGNOSIS — I10 ESSENTIAL HYPERTENSION: ICD-10-CM

## 2021-06-24 DIAGNOSIS — E11.21 TYPE 2 DIABETES WITH NEPHROPATHY (HCC): Primary | ICD-10-CM

## 2021-06-24 PROCEDURE — 99214 OFFICE O/P EST MOD 30 MIN: CPT | Performed by: INTERNAL MEDICINE

## 2021-06-24 RX ORDER — POTASSIUM CHLORIDE 20 MEQ/1
20 TABLET, EXTENDED RELEASE ORAL
COMMUNITY
Start: 2021-05-27 | End: 2021-06-27

## 2021-06-24 RX ORDER — DOXYCYCLINE HYCLATE 50 MG/1
324 CAPSULE, GELATIN COATED ORAL
Qty: 30 TABLET | Refills: 2 | Status: SHIPPED | OUTPATIENT
Start: 2021-06-24 | End: 2021-10-05

## 2021-07-22 DIAGNOSIS — R60.0 EDEMA OF LOWER EXTREMITY: ICD-10-CM

## 2021-07-22 DIAGNOSIS — M10.9 GOUT OF FOOT, UNSPECIFIED CAUSE, UNSPECIFIED CHRONICITY, UNSPECIFIED LATERALITY: ICD-10-CM

## 2021-07-22 DIAGNOSIS — N18.30 STAGE 3 CHRONIC KIDNEY DISEASE, UNSPECIFIED WHETHER STAGE 3A OR 3B CKD (HCC): ICD-10-CM

## 2021-07-22 RX ORDER — BUMETANIDE 2 MG/1
TABLET ORAL
Qty: 30 TABLET | Refills: 3 | Status: SHIPPED | OUTPATIENT
Start: 2021-07-22 | End: 2021-11-08

## 2021-07-22 RX ORDER — RIVAROXABAN 20 MG/1
TABLET, FILM COATED ORAL
Qty: 90 TABLET | Refills: 0 | Status: SHIPPED | OUTPATIENT
Start: 2021-07-22 | End: 2021-10-21 | Stop reason: SDUPTHER

## 2021-08-16 DIAGNOSIS — M10.9 GOUT OF FOOT, UNSPECIFIED CAUSE, UNSPECIFIED CHRONICITY, UNSPECIFIED LATERALITY: ICD-10-CM

## 2021-08-16 RX ORDER — ATORVASTATIN CALCIUM 20 MG/1
TABLET, FILM COATED ORAL
Qty: 90 TABLET | Refills: 1 | Status: SHIPPED | OUTPATIENT
Start: 2021-08-16 | End: 2022-01-25 | Stop reason: SDUPTHER

## 2021-08-18 RX ORDER — PEN NEEDLE, DIABETIC 32GX 5/32"
NEEDLE, DISPOSABLE MISCELLANEOUS
Qty: 300 EACH | Refills: 1 | Status: SHIPPED | OUTPATIENT
Start: 2021-08-18

## 2021-08-26 DIAGNOSIS — E11.21 TYPE 2 DIABETES WITH NEPHROPATHY (HCC): ICD-10-CM

## 2021-08-26 RX ORDER — INSULIN DETEMIR 100 [IU]/ML
70 INJECTION, SOLUTION SUBCUTANEOUS NIGHTLY
Qty: 75 ML | Refills: 1 | Status: SHIPPED | OUTPATIENT
Start: 2021-08-26 | End: 2021-08-28 | Stop reason: SDUPTHER

## 2021-08-28 DIAGNOSIS — E11.21 TYPE 2 DIABETES WITH NEPHROPATHY (HCC): ICD-10-CM

## 2021-08-28 RX ORDER — INSULIN DETEMIR 100 [IU]/ML
70 INJECTION, SOLUTION SUBCUTANEOUS NIGHTLY
Qty: 75 ML | Refills: 1 | Status: SHIPPED | OUTPATIENT
Start: 2021-08-28

## 2021-10-05 RX ORDER — DOXYCYCLINE HYCLATE 50 MG/1
CAPSULE, GELATIN COATED ORAL
Qty: 30 TABLET | Refills: 2 | Status: SHIPPED | OUTPATIENT
Start: 2021-10-05 | End: 2022-01-05

## 2021-10-11 RX ORDER — HYDRALAZINE HYDROCHLORIDE 50 MG/1
TABLET, FILM COATED ORAL
Qty: 90 TABLET | Refills: 0 | Status: SHIPPED | OUTPATIENT
Start: 2021-10-11 | End: 2021-11-12

## 2021-10-21 ENCOUNTER — OFFICE VISIT (OUTPATIENT)
Dept: FAMILY MEDICINE CLINIC | Facility: CLINIC | Age: 71
End: 2021-10-21

## 2021-10-21 VITALS
HEART RATE: 96 BPM | TEMPERATURE: 98.1 F | OXYGEN SATURATION: 100 % | DIASTOLIC BLOOD PRESSURE: 78 MMHG | HEIGHT: 70 IN | BODY MASS INDEX: 36.94 KG/M2 | WEIGHT: 258 LBS | SYSTOLIC BLOOD PRESSURE: 132 MMHG | RESPIRATION RATE: 22 BRPM

## 2021-10-21 DIAGNOSIS — I10 ESSENTIAL HYPERTENSION: ICD-10-CM

## 2021-10-21 DIAGNOSIS — M10.9 GOUT OF FOOT, UNSPECIFIED CAUSE, UNSPECIFIED CHRONICITY, UNSPECIFIED LATERALITY: ICD-10-CM

## 2021-10-21 DIAGNOSIS — Z23 NEED FOR INFLUENZA VACCINATION: ICD-10-CM

## 2021-10-21 DIAGNOSIS — E78.5 HYPERLIPIDEMIA, UNSPECIFIED HYPERLIPIDEMIA TYPE: Primary | ICD-10-CM

## 2021-10-21 DIAGNOSIS — E11.21 TYPE 2 DIABETES WITH NEPHROPATHY (HCC): ICD-10-CM

## 2021-10-21 LAB
EXPIRATION DATE: ABNORMAL
HBA1C MFR BLD: 9.2 %
Lab: ABNORMAL

## 2021-10-21 PROCEDURE — 3046F HEMOGLOBIN A1C LEVEL >9.0%: CPT | Performed by: NURSE PRACTITIONER

## 2021-10-21 PROCEDURE — G0008 ADMIN INFLUENZA VIRUS VAC: HCPCS | Performed by: NURSE PRACTITIONER

## 2021-10-21 PROCEDURE — 83036 HEMOGLOBIN GLYCOSYLATED A1C: CPT | Performed by: NURSE PRACTITIONER

## 2021-10-21 PROCEDURE — 90662 IIV NO PRSV INCREASED AG IM: CPT | Performed by: NURSE PRACTITIONER

## 2021-10-21 PROCEDURE — 99214 OFFICE O/P EST MOD 30 MIN: CPT | Performed by: NURSE PRACTITIONER

## 2021-10-21 RX ORDER — ALLOPURINOL 300 MG/1
200 TABLET ORAL DAILY
COMMUNITY
Start: 2021-08-25

## 2021-10-21 RX ORDER — POTASSIUM CHLORIDE 20 MEQ/1
20 TABLET, EXTENDED RELEASE ORAL DAILY
COMMUNITY
Start: 2021-08-23

## 2021-10-21 NOTE — TELEPHONE ENCOUNTER
Caller: [facility] - [address] [phone] [facility] [phone] FX    Relationship: Pharmacy      Requested Prescriptions:   Requested Prescriptions     Pending Prescriptions Disp Refills   • rivaroxaban (Xarelto) 20 MG tablet 90 tablet 0        Pharmacy where request should be sent: [address] [phone] [facility] [phone] FX  [phone]    Additional details provided by patient: PHARMACISTS STATED HE HAS REQUESTED THIS MEDICATION VIA ESCRIBE AND FAX.      Best call back number:[phone]      Does the patient have less than a 3 day supply:  [x] Yes  [] No    Bryan Hoffman Rep   10/21/21 09:44 EDT

## 2021-10-21 NOTE — PROGRESS NOTES
"Chief Complaint  Hyperlipidemia (6 month check ,  not fasting today ), Diabetes (lab work done from [facility]), and Hypertension    Subjective          aJvier Torres presents to [facility]  History of Present Illness  This is a 71-year-old male patient here today for evaluation of hyperlipidemia, hypertension, CKD, diabetes.  He is following endocrinology but has not been seen in a while.  He is currently on Novolin 3 times a day with Levemir at night.  His blood sugar readings are 170-420.  Hemoglobin A1c was 10.5 at last visit.  He has recently seen his nephrologist and August where he had worsening kidney function.  He was told at that time to decrease metolazone.  His lower extremity edema increased so he is now taking metolazone twice a day.  His sister states his edema is better.  Last creatinine was up to 3.66.  His weight is up to 15 pounds from last visit with me in June.  His blood pressures at home are 120-130/80.  He denies any chest pain shortness of breath lightheaded or dizziness.  He can lay flat in bed with no shortness of breath.  He is using his CPAP at night.  He denies any numbness or tingling in his lower extremities.     Objective   Vital Signs:   /78   Pulse 96   Temp 98.1 °F (36.7 °C)   Resp 22   Ht 177 cm (69.69\")   Wt 117 kg (258 lb)   SpO2 100%   BMI 37.35 kg/m²     Physical Exam  Vitals and nursing note reviewed.   HENT:      Head: Normocephalic.      Nose: Nose normal.   Eyes:      Pupils: Pupils are equal, round, and reactive to light.   Cardiovascular:      Rate and Rhythm: Normal rate and regular rhythm.      Pulses: Normal pulses.      Heart sounds: Normal heart sounds.   Pulmonary:      Effort: Pulmonary effort is normal. No respiratory distress.      Breath sounds: Normal breath sounds. No wheezing or rales.   Abdominal:      General: Bowel sounds are normal. There is no distension.      Tenderness: There is no abdominal tenderness. "   Musculoskeletal:         General: No swelling.      Cervical back: Neck supple.      Right lower leg: Edema present.      Left lower leg: Edema present.   Skin:     General: Skin is warm and dry.   Neurological:      Mental Status: He is alert and oriented to person, place, and time.   Psychiatric:         Mood and Affect: Mood normal.        Result Review :                 Assessment and Plan    Diagnoses and all orders for this visit:    1. Hyperlipidemia, unspecified hyperlipidemia type (Primary)    2. Essential hypertension    3. Type 2 diabetes with nephropathy (HCC)  -     POCT glycated hemoglobin, total  -     Ambulatory Referral to Endocrinology    4. Need for influenza vaccination  -     Fluzone High-Dose 65+yrs    Hemoglobin A1c is 9.2 so it is slowly improving.  His sister is concerned with getting into endocrine as often as needed so she is asked for a new referral today.  Patient is not fasting so he will return for fasting labs to evaluate his cholesterol.  He will continue all other medications as ordered.  We have discussed monitoring his weight daily along with his blood sugar and blood pressure.  We discussed low-salt diet and elevate his lower extremities.  He will continue to follow nephrology closely.  We discussed the importance of good diabetes and blood pressure control for his kidneys.  He will return to see me in 6 months or sooner if needed.    Follow Up   No follow-ups on file.  Patient was given instructions and counseling regarding his condition or for health maintenance advice. Please see specific information pulled into the AVS if appropriate.

## 2021-10-25 RX ORDER — INSULIN ASPART 100 [IU]/ML
INJECTION, SOLUTION INTRAVENOUS; SUBCUTANEOUS
Qty: 60 ML | Refills: 1 | Status: SHIPPED | OUTPATIENT
Start: 2021-10-25

## 2021-11-08 DIAGNOSIS — R60.0 EDEMA OF LOWER EXTREMITY: ICD-10-CM

## 2021-11-08 DIAGNOSIS — N18.30 STAGE 3 CHRONIC KIDNEY DISEASE, UNSPECIFIED WHETHER STAGE 3A OR 3B CKD (HCC): ICD-10-CM

## 2021-11-08 RX ORDER — BUMETANIDE 2 MG/1
TABLET ORAL
Qty: 30 TABLET | Refills: 0 | Status: SHIPPED | OUTPATIENT
Start: 2021-11-08 | End: 2021-12-03

## 2021-11-08 NOTE — TELEPHONE ENCOUNTER
Last ov 10/21/21    Hemoglobin A1c is 9.2 so it is slowly improving.  His sister is concerned with getting into endocrine as often as needed so she is asked for a new referral today.  Patient is not fasting so he will return for fasting labs to evaluate his cholesterol.  He will continue all other medications as ordered.  We have discussed monitoring his weight daily along with his blood sugar and blood pressure.  We discussed low-salt diet and elevate his lower extremities.  He will continue to follow nephrology closely.  We discussed the importance of good diabetes and blood pressure control for his kidneys.  He will return to see me in 6 months or sooner if needed.       Last lab   10/21/21 was only a A1C

## 2021-11-12 RX ORDER — HYDRALAZINE HYDROCHLORIDE 50 MG/1
TABLET, FILM COATED ORAL
Qty: 90 TABLET | Refills: 1 | Status: SHIPPED | OUTPATIENT
Start: 2021-11-12 | End: 2022-04-22 | Stop reason: SDUPTHER

## 2021-11-12 NOTE — TELEPHONE ENCOUNTER
Last ov 10/21/21    He will continue to follow nephrology closely.  We discussed the importance of good diabetes and blood pressure control for his kidneys.  He will return to see me in 6 months or sooner if needed.       Last lab 4/16/21    Hgb alc is high at 10.5. Recommend rescheduling appt with diabetic dr. Hemoglobin has dropped to 10.8. I would like for him to repeat cbc in 1 week from now and check hemmoccult stools. Kidney number is 2.57 comprared to 2.36 3 months ago. Please continue to see kidney dr. Uric acid level is 7.3 which is close to baseline.

## 2021-11-15 RX ORDER — BLOOD SUGAR DIAGNOSTIC
STRIP MISCELLANEOUS
Qty: 300 EACH | Refills: 3 | Status: SHIPPED | OUTPATIENT
Start: 2021-11-15

## 2021-12-02 DIAGNOSIS — N18.30 STAGE 3 CHRONIC KIDNEY DISEASE, UNSPECIFIED WHETHER STAGE 3A OR 3B CKD (HCC): ICD-10-CM

## 2021-12-02 DIAGNOSIS — R60.0 EDEMA OF LOWER EXTREMITY: ICD-10-CM

## 2021-12-03 RX ORDER — BUMETANIDE 2 MG/1
TABLET ORAL
Qty: 30 TABLET | Refills: 2 | Status: SHIPPED | OUTPATIENT
Start: 2021-12-03 | End: 2022-10-14

## 2021-12-03 NOTE — TELEPHONE ENCOUNTER
Last ov 10/21/21    Hyperlipidemia (6 month check ,  not fasting today ), Diabetes (lab work done from [facility]), and Hypertension      Last lab 4/16/21  Hgb alc is high at 10.5. Recommend rescheduling appt with diabetic dr. Hemoglobin has dropped to 10.8. I would like for him to repeat cbc in 1 week from now and check hemmoccult stools. Kidney number is 2.57 comprared to 2.36 3 months ago. Please continue to see kidney dr. Uric acid level is 7.3 which is close to baseline.

## 2021-12-27 ENCOUNTER — TELEPHONE (OUTPATIENT)
Dept: FAMILY MEDICINE CLINIC | Facility: CLINIC | Age: 71
End: 2021-12-27

## 2021-12-27 NOTE — TELEPHONE ENCOUNTER
Hub staff attempted to follow warm transfer process and was unsuccessful     Caller: Nanda Gregory [POA/HCS/Cousin]    Relationship to patient: Emergency Contact    Best call back number: [phone] (H)    Patient is needing: NEEDS TO SCHEDULE LAB APPOINTMENT ON Thursday AROUND 10:30 -11:30 AM.    PLEASE CONTACT NANDA PATIENT'S CAREGIVER.         THANKS

## 2021-12-28 NOTE — TELEPHONE ENCOUNTER
INFORMED NANDA WE ARE UNABLE TO DO LABS ON Thursday SHE WILL CALL [facility] TO SEE WHERE ELSE SHE CAN GO.

## 2022-01-01 ENCOUNTER — TELEPHONE (OUTPATIENT)
Dept: ENDOCRINOLOGY | Age: 72
End: 2022-01-01

## 2022-01-05 RX ORDER — DOXYCYCLINE HYCLATE 50 MG/1
CAPSULE, GELATIN COATED ORAL
Qty: 30 TABLET | Refills: 5 | Status: SHIPPED | OUTPATIENT
Start: 2022-01-05 | End: 2022-07-27

## 2022-01-10 NOTE — DISCHARGE INSTRUCTIONS
Home to rest.  Elevate legs to help with swelling.  Continue current medications.  Start steroids as discussed for probable gout exacerbation  Follow-up with your family doctor next week.  Return for worsening symptoms or new concerns.  Continue care with your primary care physician and have your blood pressure regularly checked and managed. Normal blood pressure is 120/80.      Good

## 2022-01-25 DIAGNOSIS — I10 ESSENTIAL HYPERTENSION: ICD-10-CM

## 2022-01-25 DIAGNOSIS — M10.9 GOUT OF FOOT, UNSPECIFIED CAUSE, UNSPECIFIED CHRONICITY, UNSPECIFIED LATERALITY: ICD-10-CM

## 2022-01-25 RX ORDER — AMLODIPINE BESYLATE 10 MG/1
10 TABLET ORAL DAILY
Qty: 90 TABLET | Refills: 1 | Status: SHIPPED | OUTPATIENT
Start: 2022-01-25 | End: 2022-03-31 | Stop reason: SDUPTHER

## 2022-01-25 RX ORDER — ATORVASTATIN CALCIUM 20 MG/1
20 TABLET, FILM COATED ORAL NIGHTLY
Qty: 90 TABLET | Refills: 1 | Status: SHIPPED | OUTPATIENT
Start: 2022-01-25 | End: 2022-03-31 | Stop reason: SDUPTHER

## 2022-02-14 RX ORDER — FAMOTIDINE 20 MG/1
20 TABLET, FILM COATED ORAL 2 TIMES DAILY
Qty: 180 TABLET | Refills: 3 | Status: SHIPPED | OUTPATIENT
Start: 2022-02-14 | End: 2023-02-10

## 2022-02-14 NOTE — TELEPHONE ENCOUNTER
Caller: [employer] [POA/HCS/Cousin]    Relationship: Emergency Contact    Best call back number: [phone]     Requested Prescriptions:   Requested Prescriptions     Pending Prescriptions Disp Refills   • famotidine (PEPCID) 20 MG tablet 180 tablet 3     Sig: Take 1 tablet by mouth 2 (Two) Times a Day.        Pharmacy where request should be sent: [facility] [address] [phone] [facility] [phone] FX       Does the patient have less than a 3 day supply:  [x] Yes  [] No    Bryan Ortiz Rep   02/14/22 09:51 EST

## 2022-03-31 ENCOUNTER — OFFICE VISIT (OUTPATIENT)
Dept: FAMILY MEDICINE CLINIC | Facility: CLINIC | Age: 72
End: 2022-03-31

## 2022-03-31 VITALS
HEART RATE: 94 BPM | OXYGEN SATURATION: 99 % | SYSTOLIC BLOOD PRESSURE: 118 MMHG | TEMPERATURE: 98.2 F | RESPIRATION RATE: 22 BRPM | HEIGHT: 70 IN | DIASTOLIC BLOOD PRESSURE: 66 MMHG | BODY MASS INDEX: 35.07 KG/M2 | WEIGHT: 245 LBS

## 2022-03-31 DIAGNOSIS — F70 MILD INTELLECTUAL DISABILITY: Primary | ICD-10-CM

## 2022-03-31 DIAGNOSIS — M10.9 GOUT OF FOOT, UNSPECIFIED CAUSE, UNSPECIFIED CHRONICITY, UNSPECIFIED LATERALITY: ICD-10-CM

## 2022-03-31 PROCEDURE — 99213 OFFICE O/P EST LOW 20 MIN: CPT | Performed by: NURSE PRACTITIONER

## 2022-03-31 RX ORDER — ASPIRIN 81 MG/1
81 TABLET ORAL DAILY
COMMUNITY

## 2022-03-31 RX ORDER — ATORVASTATIN CALCIUM 20 MG/1
TABLET, FILM COATED ORAL DAILY
COMMUNITY
Start: 2022-01-25 | End: 2022-03-31

## 2022-03-31 RX ORDER — AMLODIPINE BESYLATE 5 MG/1
TABLET ORAL DAILY
COMMUNITY
End: 2022-03-31 | Stop reason: SDUPTHER

## 2022-03-31 RX ORDER — ATORVASTATIN CALCIUM 20 MG/1
20 TABLET, FILM COATED ORAL NIGHTLY
Qty: 90 TABLET | Refills: 1 | Status: SHIPPED | OUTPATIENT
Start: 2022-03-31 | End: 2023-02-09

## 2022-03-31 RX ORDER — AMLODIPINE BESYLATE 5 MG/1
5 TABLET ORAL DAILY
Qty: 90 TABLET | Refills: 1 | Status: SHIPPED | OUTPATIENT
Start: 2022-03-31 | End: 2023-03-31

## 2022-03-31 RX ORDER — HYDRALAZINE HYDROCHLORIDE 100 MG/1
100 TABLET, FILM COATED ORAL 3 TIMES DAILY
COMMUNITY
Start: 2022-01-13 | End: 2022-03-31

## 2022-04-22 ENCOUNTER — OFFICE VISIT (OUTPATIENT)
Dept: FAMILY MEDICINE CLINIC | Facility: CLINIC | Age: 72
End: 2022-04-22

## 2022-04-22 VITALS
HEART RATE: 104 BPM | HEIGHT: 70 IN | WEIGHT: 249.2 LBS | OXYGEN SATURATION: 98 % | TEMPERATURE: 98 F | SYSTOLIC BLOOD PRESSURE: 140 MMHG | DIASTOLIC BLOOD PRESSURE: 68 MMHG | BODY MASS INDEX: 35.68 KG/M2

## 2022-04-22 DIAGNOSIS — E11.21 TYPE 2 DIABETES WITH NEPHROPATHY: ICD-10-CM

## 2022-04-22 DIAGNOSIS — Z12.5 PROSTATE CANCER SCREENING: ICD-10-CM

## 2022-04-22 DIAGNOSIS — Z13.0 SCREENING FOR DEFICIENCY ANEMIA: ICD-10-CM

## 2022-04-22 DIAGNOSIS — N18.30 STAGE 3 CHRONIC KIDNEY DISEASE, UNSPECIFIED WHETHER STAGE 3A OR 3B CKD: ICD-10-CM

## 2022-04-22 DIAGNOSIS — I10 ESSENTIAL HYPERTENSION: Primary | ICD-10-CM

## 2022-04-22 DIAGNOSIS — E55.9 VITAMIN D DEFICIENCY: ICD-10-CM

## 2022-04-22 DIAGNOSIS — E78.5 HYPERLIPIDEMIA, UNSPECIFIED HYPERLIPIDEMIA TYPE: ICD-10-CM

## 2022-04-22 LAB
EXPIRATION DATE: ABNORMAL
HBA1C MFR BLD: 8 %
Lab: ABNORMAL

## 2022-04-22 PROCEDURE — 1159F MED LIST DOCD IN RCRD: CPT | Performed by: NURSE PRACTITIONER

## 2022-04-22 PROCEDURE — 99214 OFFICE O/P EST MOD 30 MIN: CPT | Performed by: NURSE PRACTITIONER

## 2022-04-22 PROCEDURE — 1126F AMNT PAIN NOTED NONE PRSNT: CPT | Performed by: NURSE PRACTITIONER

## 2022-04-22 PROCEDURE — 83036 HEMOGLOBIN GLYCOSYLATED A1C: CPT | Performed by: NURSE PRACTITIONER

## 2022-04-22 PROCEDURE — 3052F HG A1C>EQUAL 8.0%<EQUAL 9.0%: CPT | Performed by: NURSE PRACTITIONER

## 2022-04-22 PROCEDURE — G0439 PPPS, SUBSEQ VISIT: HCPCS | Performed by: NURSE PRACTITIONER

## 2022-04-22 PROCEDURE — 1170F FXNL STATUS ASSESSED: CPT | Performed by: NURSE PRACTITIONER

## 2022-04-22 RX ORDER — HYDRALAZINE HYDROCHLORIDE 50 MG/1
50 TABLET, FILM COATED ORAL 3 TIMES DAILY
COMMUNITY
Start: 2022-04-04 | End: 2023-01-12

## 2022-04-22 NOTE — PROGRESS NOTES
"Chief Complaint  Medicare Wellness-subsequent    Subjective          Javier Torres presents to [facility]  History of Present Illness  This is a 72-year-old male patient here today for evaluation of hyperlipidemia, diabetes mellitus, hypertension.  Patient is currently on multiple medications for hypertension.  He is checking blood pressures at home and states his blood pressures run in the 130s over 70s.  He is also diabetic and follows endocrinology.  Last hemoglobin A1c was 8.4.  He reports blood sugars run in the 130s.  He denies any chest pain shortness of breath lightheaded or dizziness.  He sees Dr. Tena for chronic kidney disease.  He has chronic lower extremity edema and takes Bumex and Zaroxolyn.  Him and his cousin report his lower extremity edema has been improved.  He takes Xarelto for history of DVTs.  He denies any abnormal bleeding.  He is currently going to adult  and doing well with transitioning.      Objective   Vital Signs:   /68 (BP Location: Right arm, Patient Position: Sitting, Cuff Size: Large Adult)   Pulse 104   Temp 98 °F (36.7 °C)   Ht 177 cm (69.69\")   Wt 113 kg (249 lb 3.2 oz)   SpO2 98%   BMI 36.08 kg/m²            Physical Exam  Vitals and nursing note reviewed.   HENT:      Head: Normocephalic.      Nose: Nose normal.   Eyes:      Pupils: Pupils are equal, round, and reactive to light.   Cardiovascular:      Rate and Rhythm: Normal rate and regular rhythm.      Pulses: Normal pulses.      Heart sounds: Normal heart sounds.   Pulmonary:      Effort: Pulmonary effort is normal. No respiratory distress.      Breath sounds: Normal breath sounds. No wheezing or rales.   Abdominal:      General: Bowel sounds are normal. There is no distension.      Tenderness: There is no abdominal tenderness.   Musculoskeletal:         General: No swelling.      Cervical back: Neck supple.      Right lower leg: No edema.      Left lower leg: No edema. "   Skin:     General: Skin is warm and dry.   Neurological:      Mental Status: He is alert and oriented to person, place, and time.   Psychiatric:         Mood and Affect: Mood normal.        Result Review :                 Assessment and Plan    Diagnoses and all orders for this visit:    1. Essential hypertension (Primary)    2. Hyperlipidemia, unspecified hyperlipidemia type  -     Comprehensive Metabolic Panel  -     Lipid Panel    3. Type 2 diabetes with nephropathy (HCC)  -     POC Glycated Hemoglobin, Total  -     MicroAlbumin, Urine, Random - Urine, Clean Catch    4. Stage 3 chronic kidney disease, unspecified whether stage 3a or 3b CKD (HCC)    5. Prostate cancer screening  -     PSA SCREENING    6. Screening for deficiency anemia  -     CBC & Differential    7. Vitamin D deficiency  -     Vitamin D 25 Hydroxy    Other orders  -     MicroAlbumin, Urine, Random -           We will check his lab work today including hemoglobin A1c.  He will continue his current regimen as ordered for now  Follow Up   Return in about 6 months (around 10/22/2022).  Patient was given instructions and counseling regarding his condition or for health maintenance advice. Please see specific information pulled into the AVS if appropriate.

## 2022-04-22 NOTE — PROGRESS NOTES
The ABCs of the Annual Wellness Visit  Subsequent Medicare Wellness Visit    Chief Complaint   Patient presents with   • Medicare Wellness-subsequent      Subjective    History of Present Illness:  Javier Torres is a 72 y.o. male who presents for a Subsequent Medicare Wellness Visit.    The following portions of the patient's history were reviewed and   updated as appropriate: allergies, current medications, past family history, past medical history, past social history, past surgical history and problem list.    Compared to one year ago, the patient feels his physical   health is better.    Compared to one year ago, the patient feels his mental   health is better.    Recent Hospitalizations:  He was not admitted to the hospital during the last year.       Current Medical Providers:  Patient Care Team:  Ileana Wolf APRN as PCP - General (Family Medicine)  Blake Rubin MD as Consulting Physician (Nephrology)  Sylvester Wei DPM as Consulting Physician (Podiatry)  Federico Saucedo MD as Surgeon (Orthopedic Surgery)  Gurdeep Srivastava OD (Optometry)  Sylvester Wei DPM as Consulting Physician (Podiatry)  Essie Tang MD as Consulting Physician (Endocrinology)  Essie Tang MD as Consulting Physician (Endocrinology)    Outpatient Medications Prior to Visit   Medication Sig Dispense Refill   • acetaminophen (TYLENOL) 325 MG tablet Take 2 tablets by mouth Every 4 (Four) Hours As Needed for Mild Pain .     • allopurinol (ZYLOPRIM) 300 MG tablet Take 200 mg by mouth Daily. Per patient     • amLODIPine (NORVASC) 5 MG tablet Take 1 tablet by mouth Daily. 90 tablet 1   • aspirin 81 MG EC tablet Take 81 mg by mouth Daily.     • atorvastatin (LIPITOR) 20 MG tablet Take 1 tablet by mouth Every Night. 90 tablet 1   • bumetanide (BUMEX) 2 MG tablet TAKE ONE (1) TABLET BY MOUTH EVERY DAY (Patient taking differently: 2 (Two) Times a Day. Per patient) 30 tablet 2   • cholecalciferol  (VITAMIN D3) 1000 units tablet Take 2,000 Units by mouth Daily.     • famotidine (PEPCID) 20 MG tablet Take 1 tablet by mouth 2 (Two) Times a Day. 180 tablet 3   • ferrous gluconate (FERGON) 324 MG tablet TAKE ONE (1) TABLET BY MOUTH DAILY WITH BREAKFAST. 30 tablet 5   • insulin detemir (Levemir FlexTouch) 100 UNIT/ML injection Inject 70 Units under the skin into the appropriate area as directed Every Night. Needs follow-up visit 75 mL 1   • Insulin Pen Needle (BD Pen Needle Cassie U/F) 32G X 4 MM misc USE THREE TIMES DAILY 300 each 1   • NovoLOG FlexPen 100 UNIT/ML solution pen-injector sc pen INJECT 20 UNITS UNDER THE SKIN INTO THE APPROPRIATE AREA AS DIRECTED THREE (3) (THREE) TIMES A DAY WITH MEALS. NEEDS FOLLOW-UP VISIT (Patient taking differently: 22 units 3  time a day per patient) 60 mL 1   • ONE TOUCH LANCETS misc Dx code E11.40 testing bs 3 x day 300 each 1   • OneTouch Verio test strip Dx code E11.21 TEST BLOOD SUGAR THREE TIMES DAILY 300 each 3   • potassium chloride (K-DUR,KLOR-CON) 20 MEQ CR tablet      • rivaroxaban (Xarelto) 20 MG tablet Take 1 tablet by mouth Daily With Dinner. 90 tablet 1   • [facility] LANCETS misc Testing bs 3  X day dx code E11.40 100 each 5   • hydrALAZINE (APRESOLINE) 100 MG tablet      • metOLazone (ZAROXOLYN) 5 MG tablet Take 1 tablet by mouth Daily. x4 days then as needed (Patient taking differently: Take 5 mg by mouth As Needed. x4 days then as needed) 7 tablet 0   • polyethylene glycol (MIRALAX) 17 g packet Take 17 g by mouth Daily.     • hydrALAZINE (APRESOLINE) 50 MG tablet TAKE ONE (1) TABLET BY MOUTH THREE (3) TIMES A DAY 90 tablet 1     No facility-administered medications prior to visit.       No opioid medication identified on active medication list. I have reviewed chart for other potential  high risk medication/s and harmful drug interactions in the elderly.          Aspirin is on active medication list. Aspirin use is indicated based on review of current medical  "condition/s. Pros and cons of this therapy have been discussed today. Benefits of this medication outweigh potential harm.  Patient has been encouraged to continue taking this medication.  .      Patient Active Problem List   Diagnosis   • Type 2 diabetes with nephropathy ([facility])   • Essential hypertension   • Hyperlipidemia   • Vitamin D deficiency   • Benign prostatic hyperplasia with urinary obstruction   • Benign neoplasm of skin of buttock   • Chronic kidney disease, stage III (moderate) (CMS/HCC)   • Dyslipidemia   • Edema   • Gastroesophageal reflux disease   • Gout   • Hearing loss   • Mild intellectual disability   • Adiposity   • Obstructive sleep apnea syndrome   • Thoracic back pain   • Tricuspid valve insufficiency   • Cholelithiasis   • History of DVT (deep vein thrombosis)   • Medicare annual wellness visit, subsequent   • Knee pain   • Type 2 diabetes mellitus with diabetic neuropathy, with long-term current use of insulin ([facility])   • Neurofibroma of trunk   • Aspiration pneumonia due to gastric secretions ([facility])   • Fever   • Sepsis ([facility])   • ADILENE (acute kidney injury) ([facility])   • Acute gout of knee   • Pseudogout of knee, left     Advance Care Planning  Advance Directive is on file.  ACP discussion was held with the patient during this visit. Patient has an advance directive in EMR which is still valid.           Objective    Vitals:    04/22/22 0815   BP: 140/68   BP Location: Right arm   Patient Position: Sitting   Cuff Size: Large Adult   Pulse: 104   Temp: 98 °F (36.7 °C)   SpO2: 98%   Weight: 113 kg (249 lb 3.2 oz)   Height: 177 cm (69.69\")   PainSc: 0-No pain     BMI Readings from Last 1 Encounters:   04/22/22 36.08 kg/m²   BMI is above normal parameters. Recommendations include: exercise counseling and nutrition counseling    Does the patient have evidence of cognitive impairment? Yes    Physical Exam  Lab Results   Component Value Date    CHLPL 189 04/22/2022    TRIG 165 (H) 04/22/2022    HDL 50 " 04/22/2022     (H) 04/22/2022    VLDL 29 04/22/2022    HGBA1C 8.0 04/22/2022            HEALTH RISK ASSESSMENT    Smoking Status:  Social History     Tobacco Use   Smoking Status Never Smoker   Smokeless Tobacco Never Used     Alcohol Consumption:  Social History     Substance and Sexual Activity   Alcohol Use No     Fall Risk Screen:    MARIOLA Fall Risk Assessment has not been completed.    Depression Screening:  PHQ-2/PHQ-9 Depression Screening 4/22/2022   Retired Total Score -   Little Interest or Pleasure in Doing Things 0-->not at all   Feeling Down, Depressed or Hopeless 0-->not at all   PHQ-9: Brief Depression Severity Measure Score 0       Health Habits and Functional and Cognitive Screening:  Functional & Cognitive Status 8/9/2018   Do you have difficulty preparing food and eating? No   Do you have difficulty bathing yourself, getting dressed or grooming yourself? No   Do you have difficulty using the toilet? No   Do you have difficulty moving around from place to place? No   Do you have trouble with steps or getting out of a bed or a chair? No   Current Diet Diabetic Diet   Dental Exam Up to date   Eye Exam Unknown   Exercise (times per week) 3 times per week   Current Exercise Activities Include Walking   Do you need help using the phone?  No   Are you deaf or do you have serious difficulty hearing?  Yes   Do you need help with transportation? No   Do you need help shopping? No   Do you need help preparing meals?  No   Do you need help with housework?  No   Do you need help with laundry? No   Do you need help taking your medications? No   Do you need help managing money? Yes   Do you ever drive or ride in a car without wearing a seat belt? No   Have you felt unusual stress, anger or loneliness in the last month? No   Who do you live with? Alone   If you need help, do you have trouble finding someone available to you? No   Have you been bothered in the last four weeks by sexual problems? No   Do you  have difficulty concentrating, remembering or making decisions? No       Age-appropriate Screening Schedule:  Refer to the list below for future screening recommendations based on patient's age, sex and/or medical conditions. Orders for these recommended tests are listed in the plan section. The patient has been provided with a written plan.    Health Maintenance   Topic Date Due   • TDAP/TD VACCINES (1 - Tdap) Never done   • ZOSTER VACCINE (3 of 3) 10/08/2018   • DIABETIC EYE EXAM  06/23/2021   • INFLUENZA VACCINE  08/01/2022   • DIABETIC FOOT EXAM  09/01/2022   • HEMOGLOBIN A1C  10/22/2022   • LIPID PANEL  04/22/2023   • URINE MICROALBUMIN  04/22/2023              Assessment/Plan   CMS Preventative Services Quick Reference  Risk Factors Identified During Encounter  Cardiovascular Disease  Fall Risk-High or Moderate  Obesity/Overweight   The above risks/problems have been discussed with the patient.  Follow up actions/plans if indicated are seen below in the Assessment/Plan Section.  Pertinent information has been shared with the patient in the After Visit Summary.    Diagnoses and all orders for this visit:    1. Essential hypertension (Primary)    2. Hyperlipidemia, unspecified hyperlipidemia type  -     Comprehensive Metabolic Panel  -     Lipid Panel    3. Type 2 diabetes with nephropathy (HCC)  -     POC Glycated Hemoglobin, Total  -     MicroAlbumin, Urine, Random - Urine, Clean Catch    4. Stage 3 chronic kidney disease, unspecified whether stage 3a or 3b CKD (HCC)    5. Prostate cancer screening  -     PSA SCREENING    6. Screening for deficiency anemia  -     CBC & Differential    7. Vitamin D deficiency  -     Vitamin D 25 Hydroxy    Other orders  -     MicroAlbumin, Urine, Random -        Follow Up:   Return in about 6 months (around 10/22/2022).     An After Visit Summary and PPPS were made available to the patient.        \plain

## 2022-04-23 LAB
25(OH)D3+25(OH)D2 SERPL-MCNC: 44.4 NG/ML (ref 30–100)
ALBUMIN SERPL-MCNC: 4.6 G/DL (ref 3.7–4.7)
ALBUMIN/GLOB SERPL: 1.6 {RATIO} (ref 1.2–2.2)
ALP SERPL-CCNC: 92 IU/L (ref 44–121)
ALT SERPL-CCNC: 25 IU/L (ref 0–44)
AST SERPL-CCNC: 24 IU/L (ref 0–40)
BASOPHILS # BLD AUTO: 0.1 X10E3/UL (ref 0–0.2)
BASOPHILS NFR BLD AUTO: 1 %
BILIRUB SERPL-MCNC: 0.3 MG/DL (ref 0–1.2)
BUN SERPL-MCNC: 54 MG/DL (ref 8–27)
BUN/CREAT SERPL: 18 (ref 10–24)
CALCIUM SERPL-MCNC: 10.2 MG/DL (ref 8.6–10.2)
CHLORIDE SERPL-SCNC: 91 MMOL/L (ref 96–106)
CHOLEST SERPL-MCNC: 189 MG/DL (ref 100–199)
CO2 SERPL-SCNC: 26 MMOL/L (ref 20–29)
CREAT SERPL-MCNC: 2.94 MG/DL (ref 0.76–1.27)
EGFRCR SERPLBLD CKD-EPI 2021: 22 ML/MIN/1.73
EOSINOPHIL # BLD AUTO: 0.9 X10E3/UL (ref 0–0.4)
EOSINOPHIL NFR BLD AUTO: 10 %
ERYTHROCYTE [DISTWIDTH] IN BLOOD BY AUTOMATED COUNT: 13.3 % (ref 11.6–15.4)
GLOBULIN SER CALC-MCNC: 2.8 G/DL (ref 1.5–4.5)
GLUCOSE SERPL-MCNC: 90 MG/DL (ref 65–99)
HCT VFR BLD AUTO: 39.2 % (ref 37.5–51)
HDLC SERPL-MCNC: 50 MG/DL
HGB BLD-MCNC: 13 G/DL (ref 13–17.7)
IMM GRANULOCYTES # BLD AUTO: 0 X10E3/UL (ref 0–0.1)
IMM GRANULOCYTES NFR BLD AUTO: 0 %
LDLC SERPL CALC-MCNC: 110 MG/DL (ref 0–99)
LYMPHOCYTES # BLD AUTO: 1.5 X10E3/UL (ref 0.7–3.1)
LYMPHOCYTES NFR BLD AUTO: 17 %
MCH RBC QN AUTO: 29.5 PG (ref 26.6–33)
MCHC RBC AUTO-ENTMCNC: 33.2 G/DL (ref 31.5–35.7)
MCV RBC AUTO: 89 FL (ref 79–97)
MICROALBUMIN UR-MCNC: 36.4 UG/ML
MONOCYTES # BLD AUTO: 0.9 X10E3/UL (ref 0.1–0.9)
MONOCYTES NFR BLD AUTO: 10 %
NEUTROPHILS # BLD AUTO: 5.5 X10E3/UL (ref 1.4–7)
NEUTROPHILS NFR BLD AUTO: 62 %
PLATELET # BLD AUTO: 310 X10E3/UL (ref 150–450)
POTASSIUM SERPL-SCNC: 3.6 MMOL/L (ref 3.5–5.2)
PROT SERPL-MCNC: 7.4 G/DL (ref 6–8.5)
PSA SERPL-MCNC: 1.4 NG/ML (ref 0–4)
RBC # BLD AUTO: 4.41 X10E6/UL (ref 4.14–5.8)
SODIUM SERPL-SCNC: 138 MMOL/L (ref 134–144)
TRIGL SERPL-MCNC: 165 MG/DL (ref 0–149)
VLDLC SERPL CALC-MCNC: 29 MG/DL (ref 5–40)
WBC # BLD AUTO: 8.9 X10E3/UL (ref 3.4–10.8)

## 2022-07-27 RX ORDER — DOXYCYCLINE HYCLATE 50 MG/1
CAPSULE, GELATIN COATED ORAL
Qty: 30 TABLET | Refills: 0 | Status: SHIPPED | OUTPATIENT
Start: 2022-07-27

## 2022-08-30 DIAGNOSIS — H90.3 SENSORINEURAL HEARING LOSS, BILATERAL: Primary | ICD-10-CM

## 2022-09-29 RX ORDER — BLOOD SUGAR DIAGNOSTIC
1 STRIP MISCELLANEOUS 3 TIMES DAILY
Qty: 300 EACH | Refills: 3 | OUTPATIENT
Start: 2022-09-29

## 2022-10-05 ENCOUNTER — TRANSCRIBE ORDERS (OUTPATIENT)
Dept: ADMINISTRATIVE | Facility: HOSPITAL | Age: 72
End: 2022-10-05

## 2022-10-05 DIAGNOSIS — I77.0 ARTERIOVENOUS FISTULA, ACQUIRED: Primary | ICD-10-CM

## 2022-10-10 ENCOUNTER — HOSPITAL ENCOUNTER (OUTPATIENT)
Dept: CARDIOLOGY | Facility: HOSPITAL | Age: 72
Discharge: HOME | End: 2022-10-10
Admitting: SURGERY

## 2022-10-10 DIAGNOSIS — I77.0 ARTERIOVENOUS FISTULA, ACQUIRED: ICD-10-CM

## 2022-10-10 LAB
BH CV UPPER VENOUS LEFT BASILIC FOREARM COMPRESS: NORMAL
BH CV UPPER VENOUS LEFT BASILIC UPPER COMPRESS: NORMAL
BH CV UPPER VENOUS LEFT BRACHIAL COMPRESS: NORMAL
BH CV UPPER VENOUS LEFT CEPHALIC FOREARM COMPRESS: NORMAL
BH CV UPPER VENOUS LEFT CEPHALIC UPPER COMPRESS: NORMAL
BH CV UPPER VENOUS LEFT INTERNAL JUGULAR AUGMENT: NORMAL
BH CV UPPER VENOUS LEFT INTERNAL JUGULAR COMPRESS: NORMAL
BH CV UPPER VENOUS LEFT INTERNAL JUGULAR PHASIC: NORMAL
BH CV UPPER VENOUS LEFT INTERNAL JUGULAR SPONT: NORMAL
BH CV UPPER VENOUS LEFT RADIAL COMPRESS: NORMAL
BH CV UPPER VENOUS LEFT SUBCLAVIAN AUGMENT: NORMAL
BH CV UPPER VENOUS LEFT SUBCLAVIAN COMPRESS: NORMAL
BH CV UPPER VENOUS LEFT SUBCLAVIAN PHASIC: NORMAL
BH CV UPPER VENOUS LEFT SUBCLAVIAN SPONT: NORMAL
BH CV UPPER VENOUS RIGHT BASILIC FOREARM COMPRESS: NORMAL
BH CV UPPER VENOUS RIGHT BASILIC UPPER COMPRESS: NORMAL
BH CV UPPER VENOUS RIGHT BRACHIAL COMPRESS: NORMAL
BH CV UPPER VENOUS RIGHT CEPHALIC FOREARM COMPRESS: NORMAL
BH CV UPPER VENOUS RIGHT CEPHALIC UPPER COMPRESS: NORMAL
BH CV UPPER VENOUS RIGHT INTERNAL JUGULAR AUGMENT: NORMAL
BH CV UPPER VENOUS RIGHT INTERNAL JUGULAR COMPRESS: NORMAL
BH CV UPPER VENOUS RIGHT INTERNAL JUGULAR PHASIC: NORMAL
BH CV UPPER VENOUS RIGHT INTERNAL JUGULAR SPONT: NORMAL
BH CV UPPER VENOUS RIGHT RADIAL COMPRESS: NORMAL
BH CV UPPER VENOUS RIGHT SUBCLAVIAN AUGMENT: NORMAL
BH CV UPPER VENOUS RIGHT SUBCLAVIAN COMPRESS: NORMAL
BH CV UPPER VENOUS RIGHT SUBCLAVIAN PHASIC: NORMAL
BH CV UPPER VENOUS RIGHT SUBCLAVIAN SPONT: NORMAL
BH CV VAS MEAS BASILIC ANTECUBITAL FOSSA LEFT: 0.17 CM
BH CV VAS MEAS BASILIC ANTECUBITAL FOSSA RIGHT: 0.28 CM
BH CV VAS MEAS BASILIC FOREARM LEFT - DIST: 0.09 CM
BH CV VAS MEAS BASILIC FOREARM LEFT - MID: 0.1 CM
BH CV VAS MEAS BASILIC FOREARM LEFT - PROX: 0.17 CM
BH CV VAS MEAS BASILIC FOREARM RIGHT - DIST: 0.1 CM
BH CV VAS MEAS BASILIC FOREARM RIGHT - MID: 0.06 CM
BH CV VAS MEAS BASILIC FOREARM RIGHT - PROX: 0.22 CM
BH CV VAS MEAS BASILIC UPPER ARM LEFT - DIST: 0.46 CM
BH CV VAS MEAS BASILIC UPPER ARM LEFT - MID: 0.39 CM
BH CV VAS MEAS BASILIC UPPER ARM LEFT - PROX: 0.46 CM
BH CV VAS MEAS BASILIC UPPER ARM RIGHT - DIST: 0.26 CM
BH CV VAS MEAS BASILIC UPPER ARM RIGHT - MID: 0.33 CM
BH CV VAS MEAS BASILIC UPPER ARM RIGHT - PROX: 0.41 CM
BH CV VAS MEAS CEPHALIC ANTECUBITAL FOSSA LEFT: 0.23 CM
BH CV VAS MEAS CEPHALIC ANTECUBITAL FOSSA RIGHT: 0.38 CM
BH CV VAS MEAS CEPHALIC FOREARM LEFT - DIST: 0.14 CM
BH CV VAS MEAS CEPHALIC FOREARM LEFT - MID: 0.23 CM
BH CV VAS MEAS CEPHALIC FOREARM LEFT - PROX: 0.18 CM
BH CV VAS MEAS CEPHALIC FOREARM RIGHT - DIST: 0.32 CM
BH CV VAS MEAS CEPHALIC FOREARM RIGHT - MID: 0.32 CM
BH CV VAS MEAS CEPHALIC FOREARM RIGHT - PROX: 0.28 CM
BH CV VAS MEAS CEPHALIC UPPER ARM LEFT - DIST: 0.24 CM
BH CV VAS MEAS CEPHALIC UPPER ARM LEFT - MID: 0.27 CM
BH CV VAS MEAS CEPHALIC UPPER ARM LEFT - PROX: 0.25 CM
BH CV VAS MEAS CEPHALIC UPPER ARM RIGHT - DIST: 0.33 CM
BH CV VAS MEAS CEPHALIC UPPER ARM RIGHT - MID: 0.38 CM
BH CV VAS MEAS CEPHALIC UPPER ARM RIGHT - PROX: 0.34 CM
BH CV VAS MEAS RADIAL UPPER ARM LEFT - DIST: 0.25 CM
BH CV VAS MEAS RADIAL UPPER ARM LEFT - MID: 0.24 CM
BH CV VAS MEAS RADIAL UPPER ARM LEFT - PROX: 0.3 CM
BH CV VAS MEAS RADIAL UPPER ARM RIGHT - DIST: 0.23 CM
BH CV VAS MEAS RADIAL UPPER ARM RIGHT - MID: 0.2 CM
BH CV VAS MEAS RADIAL UPPER ARM RIGHT - PROX: 0.22 CM
MAXIMAL PREDICTED HEART RATE: 147 BPM
STRESS TARGET HR: 125 BPM
UPPER ARTERIAL LEFT ARM BRACHIAL LENGTH: 0.55 CM
UPPER ARTERIAL RIGHT ARM BRACHIAL LENGTH: 0.54 CM

## 2022-10-10 PROCEDURE — 93985 DUP-SCAN HEMO COMPL BI STD: CPT

## 2022-10-13 DIAGNOSIS — N18.30 STAGE 3 CHRONIC KIDNEY DISEASE, UNSPECIFIED WHETHER STAGE 3A OR 3B CKD: ICD-10-CM

## 2022-10-13 DIAGNOSIS — R60.0 EDEMA OF LOWER EXTREMITY: ICD-10-CM

## 2022-10-14 RX ORDER — BUMETANIDE 2 MG/1
TABLET ORAL
Qty: 30 TABLET | Refills: 2 | Status: SHIPPED | OUTPATIENT
Start: 2022-10-14 | End: 2023-01-03

## 2022-11-02 ENCOUNTER — PRE-ADMISSION TESTING (OUTPATIENT)
Dept: PREADMISSION TESTING | Facility: HOSPITAL | Age: 72
End: 2022-11-02

## 2022-11-02 VITALS
SYSTOLIC BLOOD PRESSURE: 187 MMHG | HEART RATE: 90 BPM | RESPIRATION RATE: 18 BRPM | WEIGHT: 255 LBS | BODY MASS INDEX: 36.51 KG/M2 | OXYGEN SATURATION: 97 % | TEMPERATURE: 98.3 F | DIASTOLIC BLOOD PRESSURE: 68 MMHG | HEIGHT: 70 IN

## 2022-11-02 LAB
ANION GAP SERPL CALCULATED.3IONS-SCNC: 10.1 MMOL/L (ref 5–15)
BUN SERPL-MCNC: 60 MG/DL (ref 8–23)
BUN/CREAT SERPL: 17.3 (ref 7–25)
CALCIUM SPEC-SCNC: 9.3 MG/DL (ref 8.6–10.5)
CHLORIDE SERPL-SCNC: 99 MMOL/L (ref 98–107)
CO2 SERPL-SCNC: 27.9 MMOL/L (ref 22–29)
CREAT SERPL-MCNC: 3.46 MG/DL (ref 0.76–1.27)
DEPRECATED RDW RBC AUTO: 47 FL (ref 37–54)
EGFRCR SERPLBLD CKD-EPI 2021: 17.9 ML/MIN/1.73
ERYTHROCYTE [DISTWIDTH] IN BLOOD BY AUTOMATED COUNT: 14 % (ref 12.3–15.4)
GLUCOSE SERPL-MCNC: 145 MG/DL (ref 65–99)
HCT VFR BLD AUTO: 31.8 % (ref 37.5–51)
HGB BLD-MCNC: 10.7 G/DL (ref 13–17.7)
MCH RBC QN AUTO: 31.1 PG (ref 26.6–33)
MCHC RBC AUTO-ENTMCNC: 33.6 G/DL (ref 31.5–35.7)
MCV RBC AUTO: 92.4 FL (ref 79–97)
PLATELET # BLD AUTO: 219 10*3/MM3 (ref 140–450)
PMV BLD AUTO: 9.1 FL (ref 6–12)
POTASSIUM SERPL-SCNC: 3.7 MMOL/L (ref 3.5–5.2)
RBC # BLD AUTO: 3.44 10*6/MM3 (ref 4.14–5.8)
SODIUM SERPL-SCNC: 137 MMOL/L (ref 136–145)
WBC NRBC COR # BLD: 6.16 10*3/MM3 (ref 3.4–10.8)

## 2022-11-02 PROCEDURE — 80048 BASIC METABOLIC PNL TOTAL CA: CPT

## 2022-11-02 PROCEDURE — 36415 COLL VENOUS BLD VENIPUNCTURE: CPT

## 2022-11-02 PROCEDURE — 93005 ELECTROCARDIOGRAM TRACING: CPT

## 2022-11-02 PROCEDURE — 93010 ELECTROCARDIOGRAM REPORT: CPT | Performed by: INTERNAL MEDICINE

## 2022-11-02 PROCEDURE — 85027 COMPLETE CBC AUTOMATED: CPT

## 2022-11-02 RX ORDER — CHLORHEXIDINE GLUCONATE 500 MG/1
CLOTH TOPICAL TAKE AS DIRECTED
COMMUNITY
End: 2022-11-18 | Stop reason: HOSPADM

## 2022-11-02 NOTE — DISCHARGE INSTRUCTIONS
Take the following medications the morning of surgery: AMLODIPINE AND HYDRALAZINE    ARRIVE AT 6AM ON 11/3/22    If you are on prescription narcotic pain medication to control your pain you may also take that medication the morning of surgery.    General Instructions:  Do not eat solid food after midnight the night before surgery.  You may drink clear liquids day of surgery but must stop at least one hour before your hospital arrival time.  It is beneficial for you to have a clear drink that contains carbohydrates the day of surgery.  We suggest a 12 to 20 ounce bottle of Gatorade or Powerade for non-diabetic patients or a 12 to 20 ounce bottle of G2 or Powerade Zero for diabetic patients. (Pediatric patients, are not advised to drink a 12 to 20 ounce carbohydrate drink)    Clear liquids are liquids you can see through.  Nothing red in color.     Plain water                               Sports drinks  Sodas                                   Gelatin (Jell-O)  Fruit juices without pulp such as white grape juice and apple juice  Popsicles that contain no fruit or yogurt  Tea or coffee (no cream or milk added)  Gatorade / Powerade  G2 / Powerade Zero    Infants may have breast milk up to four hours before surgery.  Infants drinking formula may drink formula up to six hours before surgery.   Patients who avoid smoking, chewing tobacco and alcohol for 4 weeks prior to surgery have a reduced risk of post-operative complications.  Quit smoking as many days before surgery as you can.  Do not smoke, use chewing tobacco or drink alcohol the day of surgery.   If applicable bring your C-PAP/ BI-PAP machine.  Bring any papers given to you in the doctor’s office.  Wear clean comfortable clothes.  Do not wear contact lenses, false eyelashes or make-up.  Bring a case for your glasses.   Bring crutches or walker if applicable.  Remove all piercings.  Leave jewelry and any other valuables at home.  Hair extensions with metal clips  must be removed prior to surgery.  The Pre-Admission Testing nurse will instruct you to bring medications if unable to obtain an accurate list in Pre-Admission Testing.        If you were given a blood bank ID arm band remember to bring it with you the day of surgery.    Preventing a Surgical Site Infection:  For 2 to 3 days before surgery, avoid shaving with a razor because the razor can irritate skin and make it easier to develop an infection.    Any areas of open skin can increase the risk of a post-operative wound infection by allowing bacteria to enter and travel throughout the body.  Notify your surgeon if you have any skin wounds / rashes even if it is not near the expected surgical site.  The area will need assessed to determine if surgery should be delayed until it is healed.  The night prior to surgery shower using a fresh bar of anti-bacterial soap (such as Dial) and clean washcloth.  Sleep in a clean bed with clean clothing.  Do not allow pets to sleep with you.  Shower on the morning of surgery using a fresh bar of anti-bacterial soap (such as Dial) and clean washcloth.  Dry with a clean towel and dress in clean clothing.  Ask your surgeon if you will be receiving antibiotics prior to surgery.  Make sure you, your family, and all healthcare providers clean their hands with soap and water or an alcohol based hand  before caring for you or your wound.  CHLORHEXIDINE CLOTH INSTRUCTIONS  The morning of surgery follow these instructions using the Chlorhexidine cloths you've been given.  These steps reduce bacteria on the body.  Do not use the cloths near your eyes, ears mouth, genitalia or on open wounds.  Throw the cloths away after use but do not try to flush them down a toilet.      Open and remove one cloth at a time from the package.    Leave the cloth unfolded and begin the bathing.  Massage the skin with the cloths using gentle pressure to remove bacteria.  Do not scrub harshly.   Follow the  steps below with one 2% CHG cloth per area (6 total cloths).  One cloth for neck, shoulders and chest.  One cloth for both arms, hands, fingers and underarms (do underarms last).  One cloth for the abdomen followed by groin.  One cloth for right leg and foot including between the toes.  One cloth for left leg and foot including between the toes.  The last cloth is to be used for the back of the neck, back and buttocks.    Allow the CHG to air dry 3 minutes on the skin which will give it time to work and decrease the chance of irritation.  The skin may feel sticky until it is dry.  Do not rinse with water or any other liquid or you will lose the beneficial effects of the CHG.  If mild skin irritation occurs, do rinse the skin to remove the CHG.  Report this to the nurse at time of admission.  Do not apply lotions, creams, ointments, deodorants or perfumes after using the clothes. Dress in clean clothes before coming to the hospital.   Day of surgery:  Your arrival time is approximately two hours before your scheduled surgery time.  Upon arrival, a Pre-op nurse and Anesthesiologist will review your health history, obtain vital signs, and answer questions you may have.  The only belongings needed at this time will be a list of your home medications and if applicable your C-PAP/BI-PAP machine.  A Pre-op nurse will start an IV and you may receive medication in preparation for surgery, including something to help you relax.     Please be aware that surgery does come with discomfort.  We want to make every effort to control your discomfort so please discuss any uncontrolled symptoms with your nurse.   Your doctor will most likely have prescribed pain medications.      If you are going home after surgery you will receive individualized written care instructions before being discharged.  A responsible adult must drive you to and from the hospital on the day of your surgery and stay with you for 24 hours.  Discharge  prescriptions can be filled by the hospital pharmacy during regular pharmacy hours.  If you are having surgery late in the day/evening your prescription may be e-prescribed to your pharmacy.  Please verify your pharmacy hours or chose a 24 hour pharmacy to avoid not having access to your prescription because your pharmacy has closed for the day.    If you are staying overnight following surgery, you will be transported to your hospital room following the recovery period.  [facility] has all private rooms.    If you have any questions please call Pre-Admission Testing at [phone].  Deductibles and co-payments are collected on the day of service. Please be prepared to pay the required co-pay, deductible or deposit on the day of service as defined by your plan.    Call your surgeon immediately if you experience any of the following symptoms:  Sore Throat  Shortness of Breath or difficulty breathing  Cough  Chills  Body soreness or muscle pain  Headache  Fever  New loss of taste or smell  Do not arrive for your surgery ill.  Your procedure will need to be rescheduled to another time.  You will need to call your physician before the day of surgery to avoid any unnecessary exposure to hospital staff as well as other patients.

## 2022-11-03 ENCOUNTER — HOSPITAL ENCOUNTER (OUTPATIENT)
Facility: HOSPITAL | Age: 72
Setting detail: HOSPITAL OUTPATIENT SURGERY
Discharge: HOME | End: 2022-11-03
Attending: SURGERY | Admitting: SURGERY

## 2022-11-03 ENCOUNTER — ANESTHESIA EVENT (OUTPATIENT)
Dept: PERIOP | Facility: HOSPITAL | Age: 72
End: 2022-11-03

## 2022-11-03 ENCOUNTER — ANESTHESIA (OUTPATIENT)
Dept: PERIOP | Facility: HOSPITAL | Age: 72
End: 2022-11-03

## 2022-11-03 VITALS
TEMPERATURE: 97.6 F | RESPIRATION RATE: 18 BRPM | DIASTOLIC BLOOD PRESSURE: 73 MMHG | OXYGEN SATURATION: 99 % | BODY MASS INDEX: 34.58 KG/M2 | SYSTOLIC BLOOD PRESSURE: 148 MMHG | HEIGHT: 72 IN | HEART RATE: 62 BPM

## 2022-11-03 DIAGNOSIS — N18.32 STAGE 3B CHRONIC KIDNEY DISEASE: Primary | ICD-10-CM

## 2022-11-03 LAB
GLUCOSE BLDC GLUCOMTR-MCNC: 183 MG/DL (ref 70–130)
QT INTERVAL: 406 MS

## 2022-11-03 PROCEDURE — 25010000002 PROPOFOL 10 MG/ML EMULSION: Performed by: NURSE ANESTHETIST, CERTIFIED REGISTERED

## 2022-11-03 PROCEDURE — 25010000002 PROTAMINE SULFATE PER 10 MG: Performed by: NURSE ANESTHETIST, CERTIFIED REGISTERED

## 2022-11-03 PROCEDURE — 82962 GLUCOSE BLOOD TEST: CPT

## 2022-11-03 PROCEDURE — 25010000002 CEFAZOLIN PER 500 MG: Performed by: SURGERY

## 2022-11-03 PROCEDURE — 25010000002 HEPARIN (PORCINE) PER 1000 UNITS: Performed by: SURGERY

## 2022-11-03 PROCEDURE — 25010000002 FENTANYL CITRATE (PF) 100 MCG/2ML SOLUTION: Performed by: NURSE ANESTHETIST, CERTIFIED REGISTERED

## 2022-11-03 PROCEDURE — 25010000002 CEFAZOLIN IN DEXTROSE 2-4 GM/100ML-% SOLUTION: Performed by: SURGERY

## 2022-11-03 PROCEDURE — 25010000002 HEPARIN (PORCINE) PER 1000 UNITS: Performed by: NURSE ANESTHETIST, CERTIFIED REGISTERED

## 2022-11-03 PROCEDURE — 0 LIDOCAINE 1 % SOLUTION 20 ML VIAL: Performed by: SURGERY

## 2022-11-03 DEVICE — LIGACLIP MCA MULTIPLE CLIP APPLIERS, 20 SMALL CLIPS
Type: IMPLANTABLE DEVICE | Site: ARM | Status: FUNCTIONAL
Brand: LIGACLIP

## 2022-11-03 RX ORDER — FENTANYL CITRATE 50 UG/ML
INJECTION, SOLUTION INTRAMUSCULAR; INTRAVENOUS AS NEEDED
Status: DISCONTINUED | OUTPATIENT
Start: 2022-11-03 | End: 2022-11-03 | Stop reason: SURG

## 2022-11-03 RX ORDER — PROPOFOL 10 MG/ML
VIAL (ML) INTRAVENOUS AS NEEDED
Status: DISCONTINUED | OUTPATIENT
Start: 2022-11-03 | End: 2022-11-03 | Stop reason: SURG

## 2022-11-03 RX ORDER — ACETAMINOPHEN 650 MG/1
650 SUPPOSITORY RECTAL ONCE AS NEEDED
Status: DISCONTINUED | OUTPATIENT
Start: 2022-11-03 | End: 2022-11-18 | Stop reason: HOSPADM

## 2022-11-03 RX ORDER — FENTANYL CITRATE 50 UG/ML
50 INJECTION, SOLUTION INTRAMUSCULAR; INTRAVENOUS
Status: DISCONTINUED | OUTPATIENT
Start: 2022-11-03 | End: 2022-11-03 | Stop reason: HOSPADM

## 2022-11-03 RX ORDER — FLUMAZENIL 0.1 MG/ML
0.2 INJECTION INTRAVENOUS AS NEEDED
Status: DISCONTINUED | OUTPATIENT
Start: 2022-11-03 | End: 2022-11-18 | Stop reason: HOSPADM

## 2022-11-03 RX ORDER — ACETAMINOPHEN 325 MG/1
650 TABLET ORAL ONCE AS NEEDED
Status: DISCONTINUED | OUTPATIENT
Start: 2022-11-03 | End: 2022-11-18 | Stop reason: HOSPADM

## 2022-11-03 RX ORDER — LIDOCAINE HYDROCHLORIDE 10 MG/ML
0.5 INJECTION, SOLUTION EPIDURAL; INFILTRATION; INTRACAUDAL; PERINEURAL ONCE AS NEEDED
Status: DISCONTINUED | OUTPATIENT
Start: 2022-11-03 | End: 2022-11-03 | Stop reason: HOSPADM

## 2022-11-03 RX ORDER — SODIUM CHLORIDE 0.9 % (FLUSH) 0.9 %
3 SYRINGE (ML) INJECTION EVERY 12 HOURS SCHEDULED
Status: DISCONTINUED | OUTPATIENT
Start: 2022-11-03 | End: 2022-11-03 | Stop reason: HOSPADM

## 2022-11-03 RX ORDER — HEPARIN SODIUM 1000 [USP'U]/ML
INJECTION, SOLUTION INTRAVENOUS; SUBCUTANEOUS AS NEEDED
Status: DISCONTINUED | OUTPATIENT
Start: 2022-11-03 | End: 2022-11-03 | Stop reason: SURG

## 2022-11-03 RX ORDER — CEFAZOLIN SODIUM 2 G/100ML
2 INJECTION, SOLUTION INTRAVENOUS ONCE
Status: COMPLETED | OUTPATIENT
Start: 2022-11-03 | End: 2022-11-03

## 2022-11-03 RX ORDER — DIPHENHYDRAMINE HCL 25 MG
25 CAPSULE ORAL
Status: DISCONTINUED | OUTPATIENT
Start: 2022-11-03 | End: 2022-11-18 | Stop reason: HOSPADM

## 2022-11-03 RX ORDER — HYDRALAZINE HYDROCHLORIDE 20 MG/ML
5 INJECTION INTRAMUSCULAR; INTRAVENOUS
Status: DISCONTINUED | OUTPATIENT
Start: 2022-11-03 | End: 2022-11-18 | Stop reason: HOSPADM

## 2022-11-03 RX ORDER — SODIUM CHLORIDE 9 MG/ML
9 INJECTION, SOLUTION INTRAVENOUS CONTINUOUS PRN
Status: DISCONTINUED | OUTPATIENT
Start: 2022-11-03 | End: 2022-11-18 | Stop reason: HOSPADM

## 2022-11-03 RX ORDER — TRAMADOL HYDROCHLORIDE 50 MG/1
50 TABLET ORAL EVERY 6 HOURS PRN
Qty: 20 TABLET | Refills: 0 | Status: SHIPPED | OUTPATIENT
Start: 2022-11-03 | End: 2023-11-03

## 2022-11-03 RX ORDER — HYDROMORPHONE HYDROCHLORIDE 1 MG/ML
0.5 INJECTION, SOLUTION INTRAMUSCULAR; INTRAVENOUS; SUBCUTANEOUS
Status: DISCONTINUED | OUTPATIENT
Start: 2022-11-03 | End: 2022-11-18 | Stop reason: HOSPADM

## 2022-11-03 RX ORDER — ONDANSETRON 2 MG/ML
4 INJECTION INTRAMUSCULAR; INTRAVENOUS ONCE AS NEEDED
Status: DISCONTINUED | OUTPATIENT
Start: 2022-11-03 | End: 2022-11-18 | Stop reason: HOSPADM

## 2022-11-03 RX ORDER — HYDROCODONE BITARTRATE AND ACETAMINOPHEN 7.5; 325 MG/1; MG/1
1 TABLET ORAL ONCE AS NEEDED
Status: DISCONTINUED | OUTPATIENT
Start: 2022-11-03 | End: 2022-11-18 | Stop reason: HOSPADM

## 2022-11-03 RX ORDER — MIDAZOLAM HYDROCHLORIDE 1 MG/ML
0.5 INJECTION INTRAMUSCULAR; INTRAVENOUS
Status: DISCONTINUED | OUTPATIENT
Start: 2022-11-03 | End: 2022-11-03 | Stop reason: HOSPADM

## 2022-11-03 RX ORDER — LABETALOL HYDROCHLORIDE 5 MG/ML
5 INJECTION, SOLUTION INTRAVENOUS
Status: DISCONTINUED | OUTPATIENT
Start: 2022-11-03 | End: 2022-11-18 | Stop reason: HOSPADM

## 2022-11-03 RX ORDER — PROMETHAZINE HYDROCHLORIDE 25 MG/1
25 SUPPOSITORY RECTAL ONCE AS NEEDED
Status: DISCONTINUED | OUTPATIENT
Start: 2022-11-03 | End: 2022-11-18 | Stop reason: HOSPADM

## 2022-11-03 RX ORDER — OXYCODONE AND ACETAMINOPHEN 7.5; 325 MG/1; MG/1
1 TABLET ORAL EVERY 4 HOURS PRN
Status: DISCONTINUED | OUTPATIENT
Start: 2022-11-03 | End: 2022-11-10 | Stop reason: HOSPADM

## 2022-11-03 RX ORDER — LIDOCAINE HYDROCHLORIDE 20 MG/ML
INJECTION, SOLUTION INFILTRATION; PERINEURAL AS NEEDED
Status: DISCONTINUED | OUTPATIENT
Start: 2022-11-03 | End: 2022-11-03 | Stop reason: SURG

## 2022-11-03 RX ORDER — FENTANYL CITRATE 50 UG/ML
25 INJECTION, SOLUTION INTRAMUSCULAR; INTRAVENOUS
Status: DISCONTINUED | OUTPATIENT
Start: 2022-11-03 | End: 2022-11-18 | Stop reason: HOSPADM

## 2022-11-03 RX ORDER — EPHEDRINE SULFATE 50 MG/ML
5 INJECTION, SOLUTION INTRAVENOUS ONCE AS NEEDED
Status: DISCONTINUED | OUTPATIENT
Start: 2022-11-03 | End: 2022-11-18 | Stop reason: HOSPADM

## 2022-11-03 RX ORDER — FAMOTIDINE 10 MG/ML
20 INJECTION, SOLUTION INTRAVENOUS ONCE
Status: COMPLETED | OUTPATIENT
Start: 2022-11-03 | End: 2022-11-03

## 2022-11-03 RX ORDER — SODIUM CHLORIDE 0.9 % (FLUSH) 0.9 %
3-10 SYRINGE (ML) INJECTION AS NEEDED
Status: DISCONTINUED | OUTPATIENT
Start: 2022-11-03 | End: 2022-11-03 | Stop reason: HOSPADM

## 2022-11-03 RX ORDER — PROMETHAZINE HYDROCHLORIDE 25 MG/1
25 TABLET ORAL ONCE AS NEEDED
Status: DISCONTINUED | OUTPATIENT
Start: 2022-11-03 | End: 2022-11-18 | Stop reason: HOSPADM

## 2022-11-03 RX ORDER — GLYCOPYRROLATE 0.2 MG/ML
INJECTION INTRAMUSCULAR; INTRAVENOUS AS NEEDED
Status: DISCONTINUED | OUTPATIENT
Start: 2022-11-03 | End: 2022-11-03 | Stop reason: SURG

## 2022-11-03 RX ORDER — NALOXONE HCL 0.4 MG/ML
0.2 VIAL (ML) INJECTION AS NEEDED
Status: DISCONTINUED | OUTPATIENT
Start: 2022-11-03 | End: 2022-11-18 | Stop reason: HOSPADM

## 2022-11-03 RX ORDER — DIPHENHYDRAMINE HYDROCHLORIDE 50 MG/ML
12.5 INJECTION INTRAMUSCULAR; INTRAVENOUS
Status: DISCONTINUED | OUTPATIENT
Start: 2022-11-03 | End: 2022-11-18 | Stop reason: HOSPADM

## 2022-11-03 RX ORDER — PROTAMINE SULFATE 10 MG/ML
INJECTION, SOLUTION INTRAVENOUS AS NEEDED
Status: DISCONTINUED | OUTPATIENT
Start: 2022-11-03 | End: 2022-11-03 | Stop reason: SURG

## 2022-11-03 RX ADMIN — SODIUM CHLORIDE 9 ML/HR: 9 INJECTION, SOLUTION INTRAVENOUS at 07:12

## 2022-11-03 RX ADMIN — PROPOFOL 75 MCG/KG/MIN: 10 INJECTION, EMULSION INTRAVENOUS at 08:02

## 2022-11-03 RX ADMIN — CEFAZOLIN SODIUM 2 G: 2 INJECTION, SOLUTION INTRAVENOUS at 07:50

## 2022-11-03 RX ADMIN — GLYCOPYRROLATE 0.2 MG: 1 INJECTION INTRAMUSCULAR; INTRAVENOUS at 08:19

## 2022-11-03 RX ADMIN — HEPARIN SODIUM 7500 UNITS: 1000 INJECTION INTRAVENOUS; SUBCUTANEOUS at 08:23

## 2022-11-03 RX ADMIN — PROTAMINE SULFATE 40 MG: 10 INJECTION, SOLUTION INTRAVENOUS at 08:48

## 2022-11-03 RX ADMIN — FAMOTIDINE 20 MG: 10 INJECTION INTRAVENOUS at 07:16

## 2022-11-03 RX ADMIN — PROPOFOL 50 MG: 10 INJECTION, EMULSION INTRAVENOUS at 08:00

## 2022-11-03 RX ADMIN — LIDOCAINE HYDROCHLORIDE 60 MG: 20 INJECTION, SOLUTION INFILTRATION; PERINEURAL at 08:00

## 2022-11-03 RX ADMIN — FENTANYL CITRATE 50 MCG: 50 INJECTION, SOLUTION INTRAMUSCULAR; INTRAVENOUS at 08:06

## 2022-11-03 NOTE — DISCHARGE INSTRUCTIONS
[facility]  [address], [address]her[address] ,[place], [place]  [address], [address]  [phone]    Post-Operative Instructions for AV Fistula / Graft   Diet: Regular Diet    Medications: Take your regularly scheduled medications on the day of your surgery, unless your doctor has directed you otherwise. You may be sent home with a prescription for pain medication, follow the directions as prescribed.    Activity Restrictions / Driving: Avoid lifting more than 15 pounds or other activities that stress or compress the access area. No driving for the remainder of the day after surgery. You may drive when you no longer are taking narcotic pain medications. If a nerve block was done to numb your arm for surgery, you will be placed in an arm sling.  This numbness and inability to move the arm can last for as little as 6 hours but as many as 18.  The sling should be used during this time but can be removed when sensation and movement of your arm is normal and does not need to be used after that. Use of the arm is encouraged after the surgery.    Incision Care: Some bruising is normal. If you have drainage from the incision please notify the office. Dressing should be removed in 48 hours. After dressing is removed, it is OK to shower. Do not submerge incision until cleared by your surgeon (bath or swimming).    Bathing and Showering: You may shower after you remove your dressing.    Follow-up Appointments: You will need to return to the office for a follow-up visit within 1-3 weeks after your surgery. Please make sure you have your appointment scheduled, call [phone].    The patient (you) should:  1. Avoid wearing tight constrictive clothing over that arm.  2. Avoid wearing jewelry that is tight, such as a watch on the access arm.  3. Avoid carrying heavy objects.  4. Avoid purse straps over the fistula.  5. Avoid sleeping on the arm or keeping it bent for extended periods of time.  6.  "Each day, using your opposite hand, feel over the fistula for the \"thrill\" or vibration that is normally present.    Fistula Information / Care:   It is normal to have swelling in the surgical area. To help control this swelling, you should elevate your arm on a pillow.   Wiggle your fingers and clinch your fist 10 times every hour, while awake, for the first 5-7 days. Also, bend and straighten at the elbow to regain normal range of motion. These exercises are designed to promote circulation in the fingers and aid in draining away the excess fluid accumulation in the immediate area.  No blood pressures or needle sticks in the arm with your access.    Call the office for the followin. Fever greater than 101.0  2. Uncontrolled pain. This is on a scale of 1-10 (10 being the worst pain imaginable) your pain is a level 7 or above.  3. It is important that you notify our office if you are having numbness and significant pain in the extremity in which you have just had surgery!  4. Decreased or absent thrill.  5. Nausea, diarrhea, and/or vomiting that continue for 12-24 hours.  6. Signs of an infection: redness, increased swelling, drainage, fever and/or chills.  7. Chest pain or difficulty breathing.    The fistula or graft CAN NOT be used until the MD has given written approval. Generally, a graft will be ready to use in 2 weeks, and a fistula will be ready to use in 6-8 weeks.     If you have further questions after reading this handout, the office is open from 8:30am to 5:00pm Monday through Friday. Call [phone].   "

## 2022-11-03 NOTE — ANESTHESIA POSTPROCEDURE EVALUATION
Patient: Javier Torres    Procedure Summary     Date: 11/03/22 Room / Location: [facility] OR [address] [place] [address] MAIN OR    Anesthesia Start: 0755 Anesthesia Stop: 0911    Procedure: RIGHT BRACHIOCEPHALIC ARTERIOVENOUS FISTULA PLACEMENT (Right) Diagnosis:     Surgeons: Dayron Frey Jr., MD Provider: Mirella Thomason MD    Anesthesia Type: MAC ASA Status: 3          Anesthesia Type: MAC    Vitals  Vitals Value Taken Time   /73 11/03/22 0945   Temp     Pulse 67 11/03/22 0951   Resp 18 11/03/22 0945   SpO2 97 % 11/03/22 0951   Vitals shown include unvalidated device data.        Post Anesthesia Care and Evaluation    Patient location during evaluation: PACU  Patient participation: complete - patient participated  Level of consciousness: awake and alert  Pain management: adequate    Airway patency: patent  Anesthetic complications: No anesthetic complications    Cardiovascular status: acceptable  Respiratory status: acceptable  Hydration status: acceptable    Comments: --------------------            11/03/22 0945     --------------------   BP:       148/73     Pulse:      62       Resp:       18       Temp:                SpO2:      99%      --------------------

## 2022-11-03 NOTE — ANESTHESIA POSTPROCEDURE EVALUATION
Patient: Javier Torres    Procedure Summary     Date: 11/03/22 Room / Location: [facility] OR [address] [place] [address] MAIN OR    Anesthesia Start: 0755 Anesthesia Stop: 0911    Procedure: RIGHT BRACHIOCEPHALIC ARTERIOVENOUS FISTULA PLACEMENT (Right) Diagnosis:     Surgeons: Dayron Frey Jr., MD Provider: Mirella Thomason MD    Anesthesia Type: MAC ASA Status: 3          Anesthesia Type: MAC    Vitals  Vitals Value Taken Time   /73 11/03/22 0945   Temp     Pulse 67 11/03/22 0951   Resp 18 11/03/22 0945   SpO2 97 % 11/03/22 0951   Vitals shown include unvalidated device data.        Post Anesthesia Care and Evaluation    Patient location during evaluation: PACU  Patient participation: complete - patient participated  Level of consciousness: awake and alert  Pain management: adequate    Airway patency: patent  Anesthetic complications: No anesthetic complications    Cardiovascular status: acceptable  Respiratory status: acceptable  Hydration status: acceptable    Comments: --------------------            11/03/22 0945     --------------------   BP:       148/73     Pulse:      62       Resp:       18       Temp:                SpO2:      99%      --------------------

## 2022-11-03 NOTE — ANESTHESIA PREPROCEDURE EVALUATION
Anesthesia Evaluation     Patient summary reviewed and Nursing notes reviewed   NPO Solid Status: > 8 hours  NPO Liquid Status: > 2 hours           Airway   Mallampati: III  TM distance: >3 FB  Dental    (+) upper dentures and lower dentures    Pulmonary    (+) pneumonia (aspiration PNA due to gastric secretions) , shortness of breath, sleep apnea on CPAP,   Cardiovascular     ECG reviewed  Rhythm: regular  Rate: normal    (+) hypertension, valvular problems/murmurs TI, DVT, hyperlipidemia,       Neuro/Psych  (+) psychiatric history (mild intellectual disability),    GI/Hepatic/Renal/Endo    (+) obesity,  GERD,  renal disease (stage III) CRI, diabetes mellitus type 2 poorly controlled using insulin,     Musculoskeletal     Abdominal    Substance History      OB/GYN          Other   arthritis,      ROS/Med Hx Other: [place]                  Anesthesia Plan    ASA 3     MAC     (Light mac for airway protection  Unable to hear without hearing aids which are in place pre-op  Mild intellectual disability  Patient's cousin present today        I have reviewed the patient's history and chart with the patient, including all pertinent laboratory results and imaging. I have explained the risks of anesthesia including but not limited to dental damage, corneal abrasion, nerve injury, MI, stroke, aspiration, and death. Patient has agreed to proceed.     )  intravenous induction     Anesthetic plan, risks, benefits, and alternatives have been provided, discussed and informed consent has been obtained with: patient.        CODE STATUS:

## 2022-11-03 NOTE — OP NOTE
Operative Note    Pre-op Diagnosis: Chronic kidney disease, stage IV-V    Post-op Diagnosis: Same    Procedure(s):  RIGHT BRACHIOCEPHALIC ARTERIOVENOUS FISTULA PLACEMENT    Surgeon(s):  Dayron Frey Jr., MD    Assistant: Vanna Lr RN     and they provided critical assistance during the case including suctioning, exposure, retraction, and reduction of blood loss.    Anesthesia: Monitored Anesthesia Care    Estimated Blood Loss: Minimal    Specimens:   None    Staff:   Circulator: Sinai Almeida RN  Scrub Person: Umair Robles  Assistant: Vanna Lr    Complications: None    Findings:  The vein was of fair quality, the artery was of good quality, the thrill was of good quality.     Indications:  The patient is an 72 y.o. male referred for evaluation for AV fistula placement.  The patient has Chronic Kidney Disease Stage IV .  After evaluation in the office and ultrasound vein mapping of the upper extremities the patient was determined to be a candidate for brachiocephalic arteriovenous fistula.  The risks, benefits, and alternatives were discussed with the patient who agreed to proceed.  This includes but is not limited to nerve injury, vascular compromise, infection, and failure to mature.    Procedure:  The patient was taken to Operating Room and identified as Javier Torres and the procedure verified as right brachial cephalic AVF. A Time Out was held and the above information confirmed.    In the operating room with the patient sedated the arm was mapped under ultrasound again to help determine the best location for incision.  A decision was made to make the incision below the antecubital fossa.  Skin and subcutaneous tissues were anesthetized.  The skin and subcutaneous was divided sharply.  The cephalic vein was identified and dissected out circumferentially for a significant segment.  It was marked.  The bicipital aponeurosis was exposed.  Local anesthesia was administered low this and then the  aponeurosis was divided sharply.  The brachial artery was exposed circumferentially.  The cephalic vein was mobilized for several cm so that it could be ligated distally with a 3-0 silk ligature and then transposed over to the brachial artery.  The vein is spatulated in order to fit the arterial anastomosis.  It was straightened and marked to reduce the risk of twisting.  Vascular control was obtained of the brachial artery and a lateral arteriotomy made. The anastomosis was performed with a running 6-0 Prolene suture.  The artery was flushed, then the vein flushed and the anastomosis irrigated with heparinized saline solution prior to completion.  There was a good thrill to completion of the case.  There was a good Doppler signal at the wrist as well.  The wound was copious irrigated with an antibiotic irrigation and closed in 2 layers with a running 3-0 Vicryl suture for the subcutaneous tissue and a running 4-0 Vicryl in a subcuticular fashion for the skin.  Dermabond glue was used for the skin.  Patient tolerated it well and no intraoperative complications.        There are no hospital problems to display for this patient.     Dayron Frey Jr., MD     Date: 11/3/2022  Time: 09:09 EDT

## 2022-11-03 NOTE — INTERVAL H&P NOTE
The only change to the history and/or physical examination is that he had vein mapping performed and the right cephalic vein is of good quality for fistula creation.  Therefore, a right brachiocephalic AV fistula below the elbow will be performed.  This was once again discussed with the patient and his caretaker/power of .  They are in agreement.  Otherwise, history and physical examination have been reviewed and is unchanged.

## 2022-12-01 RX ORDER — DOXYCYCLINE HYCLATE 50 MG/1
CAPSULE, GELATIN COATED ORAL
Qty: 30 TABLET | Refills: 0 | OUTPATIENT
Start: 2022-12-01

## 2022-12-29 ENCOUNTER — TELEPHONE (OUTPATIENT)
Dept: FAMILY MEDICINE CLINIC | Facility: CLINIC | Age: 72
End: 2022-12-29

## 2022-12-29 DIAGNOSIS — N18.30 STAGE 3 CHRONIC KIDNEY DISEASE, UNSPECIFIED WHETHER STAGE 3A OR 3B CKD: ICD-10-CM

## 2022-12-29 DIAGNOSIS — R60.0 EDEMA OF LOWER EXTREMITY: ICD-10-CM

## 2022-12-29 NOTE — TELEPHONE ENCOUNTER
provider working on 01/5/2023 instead of 01/04/2022. I called the # the pt has in his chart and it stated the number has been disconnected so i was unable to leave a VM. If pt calls back, please see if he can come in on 01/5/2023, HUB OKAY TO READ

## 2023-01-01 ENCOUNTER — HOSPITAL ENCOUNTER (INPATIENT)
Facility: HOSPITAL | Age: 73
LOS: 1 days | DRG: 951 | End: 2023-07-12
Attending: INTERNAL MEDICINE | Admitting: HOSPITALIST
Payer: COMMERCIAL

## 2023-01-01 PROCEDURE — 25010000002 MORPHINE PER 10 MG: Performed by: INTERNAL MEDICINE

## 2023-01-01 PROCEDURE — 25010000002 LORAZEPAM PER 2 MG: Performed by: INTERNAL MEDICINE

## 2023-01-01 RX ORDER — LORAZEPAM 2 MG/ML
0.5 CONCENTRATE ORAL
Status: DISCONTINUED | OUTPATIENT
Start: 2023-01-01 | End: 2023-01-01 | Stop reason: HOSPADM

## 2023-01-01 RX ORDER — SODIUM CHLORIDE 0.9 % (FLUSH) 0.9 %
10 SYRINGE (ML) INJECTION AS NEEDED
Status: DISCONTINUED | OUTPATIENT
Start: 2023-01-01 | End: 2023-01-01 | Stop reason: HOSPADM

## 2023-01-01 RX ORDER — SCOLOPAMINE TRANSDERMAL SYSTEM 1 MG/1
1 PATCH, EXTENDED RELEASE TRANSDERMAL
Status: DISCONTINUED | OUTPATIENT
Start: 2023-01-01 | End: 2023-01-01 | Stop reason: HOSPADM

## 2023-01-01 RX ORDER — MORPHINE SULFATE 20 MG/ML
5 SOLUTION ORAL
Status: DISCONTINUED | OUTPATIENT
Start: 2023-01-01 | End: 2023-01-01 | Stop reason: HOSPADM

## 2023-01-01 RX ORDER — LORAZEPAM 2 MG/ML
1 INJECTION INTRAMUSCULAR
Status: DISCONTINUED | OUTPATIENT
Start: 2023-01-01 | End: 2023-01-01 | Stop reason: HOSPADM

## 2023-01-01 RX ORDER — GLYCOPYRROLATE 0.2 MG/ML
0.2 INJECTION INTRAMUSCULAR; INTRAVENOUS
Status: DISCONTINUED | OUTPATIENT
Start: 2023-01-01 | End: 2023-01-01 | Stop reason: HOSPADM

## 2023-01-01 RX ORDER — LORAZEPAM 2 MG/ML
2 CONCENTRATE ORAL
Status: DISCONTINUED | OUTPATIENT
Start: 2023-01-01 | End: 2023-01-01 | Stop reason: HOSPADM

## 2023-01-01 RX ORDER — SODIUM CHLORIDE 9 MG/ML
40 INJECTION, SOLUTION INTRAVENOUS AS NEEDED
Status: DISCONTINUED | OUTPATIENT
Start: 2023-01-01 | End: 2023-01-01 | Stop reason: HOSPADM

## 2023-01-01 RX ORDER — LORAZEPAM 2 MG/ML
1 CONCENTRATE ORAL
Status: DISCONTINUED | OUTPATIENT
Start: 2023-01-01 | End: 2023-01-01 | Stop reason: HOSPADM

## 2023-01-01 RX ORDER — GLYCOPYRROLATE 0.2 MG/ML
0.4 INJECTION INTRAMUSCULAR; INTRAVENOUS
Status: DISCONTINUED | OUTPATIENT
Start: 2023-01-01 | End: 2023-01-01 | Stop reason: HOSPADM

## 2023-01-01 RX ORDER — HYDROMORPHONE HYDROCHLORIDE 2 MG/ML
1.5 INJECTION, SOLUTION INTRAMUSCULAR; INTRAVENOUS; SUBCUTANEOUS
Status: DISCONTINUED | OUTPATIENT
Start: 2023-01-01 | End: 2023-01-01 | Stop reason: HOSPADM

## 2023-01-01 RX ORDER — ONDANSETRON 2 MG/ML
4 INJECTION INTRAMUSCULAR; INTRAVENOUS EVERY 6 HOURS PRN
Status: DISCONTINUED | OUTPATIENT
Start: 2023-01-01 | End: 2023-01-01 | Stop reason: HOSPADM

## 2023-01-01 RX ORDER — DOCUSATE SODIUM 100 MG/1
100 CAPSULE, LIQUID FILLED ORAL 2 TIMES DAILY
Status: DISCONTINUED | OUTPATIENT
Start: 2023-01-01 | End: 2023-01-01 | Stop reason: HOSPADM

## 2023-01-01 RX ORDER — MORPHINE SULFATE 20 MG/ML
20 SOLUTION ORAL
Status: DISCONTINUED | OUTPATIENT
Start: 2023-01-01 | End: 2023-01-01 | Stop reason: HOSPADM

## 2023-01-01 RX ORDER — IBUPROFEN 600 MG/1
1 TABLET ORAL
Status: DISCONTINUED | OUTPATIENT
Start: 2023-01-01 | End: 2023-01-01 | Stop reason: HOSPADM

## 2023-01-01 RX ORDER — LORAZEPAM 2 MG/ML
0.5 INJECTION INTRAMUSCULAR
Status: DISCONTINUED | OUTPATIENT
Start: 2023-01-01 | End: 2023-01-01 | Stop reason: HOSPADM

## 2023-01-01 RX ORDER — NICOTINE POLACRILEX 4 MG
15 LOZENGE BUCCAL
Status: DISCONTINUED | OUTPATIENT
Start: 2023-01-01 | End: 2023-01-01 | Stop reason: HOSPADM

## 2023-01-01 RX ORDER — BISACODYL 10 MG
10 SUPPOSITORY, RECTAL RECTAL DAILY PRN
Status: DISCONTINUED | OUTPATIENT
Start: 2023-01-01 | End: 2023-01-01 | Stop reason: HOSPADM

## 2023-01-01 RX ORDER — DIPHENOXYLATE HYDROCHLORIDE AND ATROPINE SULFATE 2.5; .025 MG/1; MG/1
1 TABLET ORAL
Status: DISCONTINUED | OUTPATIENT
Start: 2023-01-01 | End: 2023-01-01 | Stop reason: HOSPADM

## 2023-01-01 RX ORDER — SODIUM CHLORIDE 0.9 % (FLUSH) 0.9 %
10 SYRINGE (ML) INJECTION EVERY 12 HOURS SCHEDULED
Status: DISCONTINUED | OUTPATIENT
Start: 2023-01-01 | End: 2023-01-01 | Stop reason: HOSPADM

## 2023-01-01 RX ORDER — MORPHINE SULFATE 4 MG/ML
4 INJECTION, SOLUTION INTRAMUSCULAR; INTRAVENOUS
Status: DISCONTINUED | OUTPATIENT
Start: 2023-01-01 | End: 2023-01-01 | Stop reason: HOSPADM

## 2023-01-01 RX ORDER — MORPHINE SULFATE 20 MG/ML
10 SOLUTION ORAL
Status: DISCONTINUED | OUTPATIENT
Start: 2023-01-01 | End: 2023-01-01 | Stop reason: HOSPADM

## 2023-01-01 RX ORDER — ACETAMINOPHEN 160 MG/5ML
650 SOLUTION ORAL EVERY 4 HOURS PRN
Status: DISCONTINUED | OUTPATIENT
Start: 2023-01-01 | End: 2023-01-01 | Stop reason: HOSPADM

## 2023-01-01 RX ORDER — HYDROMORPHONE HYDROCHLORIDE 1 MG/ML
0.5 INJECTION, SOLUTION INTRAMUSCULAR; INTRAVENOUS; SUBCUTANEOUS
Status: DISCONTINUED | OUTPATIENT
Start: 2023-01-01 | End: 2023-01-01 | Stop reason: HOSPADM

## 2023-01-01 RX ORDER — BISACODYL 5 MG/1
5 TABLET, DELAYED RELEASE ORAL DAILY PRN
Status: DISCONTINUED | OUTPATIENT
Start: 2023-01-01 | End: 2023-01-01 | Stop reason: HOSPADM

## 2023-01-01 RX ORDER — POLYETHYLENE GLYCOL 3350 17 G/17G
17 POWDER, FOR SOLUTION ORAL DAILY PRN
Status: DISCONTINUED | OUTPATIENT
Start: 2023-01-01 | End: 2023-01-01 | Stop reason: HOSPADM

## 2023-01-01 RX ORDER — AMOXICILLIN 250 MG
2 CAPSULE ORAL 2 TIMES DAILY
Status: DISCONTINUED | OUTPATIENT
Start: 2023-01-01 | End: 2023-01-01 | Stop reason: HOSPADM

## 2023-01-01 RX ORDER — DEXTROSE MONOHYDRATE 25 G/50ML
25 INJECTION, SOLUTION INTRAVENOUS
Status: DISCONTINUED | OUTPATIENT
Start: 2023-01-01 | End: 2023-01-01 | Stop reason: HOSPADM

## 2023-01-01 RX ORDER — ACETAMINOPHEN 325 MG/1
650 TABLET ORAL EVERY 4 HOURS PRN
Status: DISCONTINUED | OUTPATIENT
Start: 2023-01-01 | End: 2023-01-01 | Stop reason: HOSPADM

## 2023-01-01 RX ORDER — LORAZEPAM 2 MG/ML
2 INJECTION INTRAMUSCULAR
Status: DISCONTINUED | OUTPATIENT
Start: 2023-01-01 | End: 2023-01-01 | Stop reason: HOSPADM

## 2023-01-01 RX ORDER — KETOROLAC TROMETHAMINE 15 MG/ML
15 INJECTION, SOLUTION INTRAMUSCULAR; INTRAVENOUS EVERY 6 HOURS PRN
Status: DISCONTINUED | OUTPATIENT
Start: 2023-01-01 | End: 2023-01-01 | Stop reason: HOSPADM

## 2023-01-01 RX ORDER — MORPHINE SULFATE 2 MG/ML
2 INJECTION, SOLUTION INTRAMUSCULAR; INTRAVENOUS
Status: DISCONTINUED | OUTPATIENT
Start: 2023-01-01 | End: 2023-01-01 | Stop reason: HOSPADM

## 2023-01-01 RX ORDER — MORPHINE SULFATE 10 MG/ML
6 INJECTION INTRAMUSCULAR; INTRAVENOUS; SUBCUTANEOUS
Status: DISCONTINUED | OUTPATIENT
Start: 2023-01-01 | End: 2023-01-01 | Stop reason: HOSPADM

## 2023-01-01 RX ORDER — ACETAMINOPHEN 650 MG/1
650 SUPPOSITORY RECTAL EVERY 4 HOURS PRN
Status: DISCONTINUED | OUTPATIENT
Start: 2023-01-01 | End: 2023-01-01 | Stop reason: HOSPADM

## 2023-01-01 RX ADMIN — LORAZEPAM 1 MG: 2 INJECTION INTRAMUSCULAR; INTRAVENOUS at 20:30

## 2023-01-01 RX ADMIN — MORPHINE SULFATE 4 MG: 4 INJECTION, SOLUTION INTRAMUSCULAR; INTRAVENOUS at 20:30

## 2023-01-01 RX ADMIN — Medication 10 ML: at 20:30

## 2023-01-01 RX ADMIN — GLYCOPYRROLATE 0.4 MG: 0.2 INJECTION INTRAMUSCULAR; INTRAVENOUS at 20:30

## 2023-01-03 RX ORDER — BUMETANIDE 2 MG/1
TABLET ORAL
Qty: 30 TABLET | Refills: 2 | Status: SHIPPED | OUTPATIENT
Start: 2023-01-03

## 2023-01-04 ENCOUNTER — TELEPHONE (OUTPATIENT)
Dept: FAMILY MEDICINE CLINIC | Facility: CLINIC | Age: 73
End: 2023-01-04

## 2023-01-12 ENCOUNTER — OFFICE VISIT (OUTPATIENT)
Dept: FAMILY MEDICINE CLINIC | Facility: CLINIC | Age: 73
End: 2023-01-12
Payer: MEDICARE

## 2023-01-12 ENCOUNTER — REFERRAL TRIAGE (OUTPATIENT)
Dept: CASE MANAGEMENT | Facility: OTHER | Age: 73
End: 2023-01-12
Payer: MEDICARE

## 2023-01-12 VITALS
WEIGHT: 258.6 LBS | BODY MASS INDEX: 35.03 KG/M2 | DIASTOLIC BLOOD PRESSURE: 72 MMHG | HEIGHT: 72 IN | SYSTOLIC BLOOD PRESSURE: 146 MMHG | TEMPERATURE: 97.7 F | OXYGEN SATURATION: 98 % | HEART RATE: 85 BPM

## 2023-01-12 DIAGNOSIS — F70 MILD INTELLECTUAL DISABILITY: ICD-10-CM

## 2023-01-12 DIAGNOSIS — Z13.0 SCREENING FOR DEFICIENCY ANEMIA: ICD-10-CM

## 2023-01-12 DIAGNOSIS — E78.5 HYPERLIPIDEMIA, UNSPECIFIED HYPERLIPIDEMIA TYPE: ICD-10-CM

## 2023-01-12 DIAGNOSIS — M1A.39X0 CHRONIC GOUT DUE TO RENAL IMPAIRMENT OF MULTIPLE SITES WITHOUT TOPHUS: ICD-10-CM

## 2023-01-12 DIAGNOSIS — E11.21 TYPE 2 DIABETES WITH NEPHROPATHY: Primary | ICD-10-CM

## 2023-01-12 DIAGNOSIS — I10 ESSENTIAL HYPERTENSION: ICD-10-CM

## 2023-01-12 LAB
ALBUMIN SERPL-MCNC: 4.7 G/DL (ref 3.5–5.2)
ALBUMIN/GLOB SERPL: 2 G/DL
ALP SERPL-CCNC: 78 U/L (ref 39–117)
ALT SERPL-CCNC: 21 U/L (ref 1–41)
APPEARANCE UR: CLEAR
AST SERPL-CCNC: 20 U/L (ref 1–40)
BACTERIA #/AREA URNS HPF: NORMAL /HPF
BASOPHILS # BLD AUTO: 0.06 10*3/MM3 (ref 0–0.2)
BASOPHILS NFR BLD AUTO: 0.8 % (ref 0–1.5)
BILIRUB SERPL-MCNC: 0.3 MG/DL (ref 0–1.2)
BILIRUB UR QL STRIP: NEGATIVE
BUN SERPL-MCNC: 62 MG/DL (ref 8–23)
BUN/CREAT SERPL: 17.5 (ref 7–25)
CALCIUM SERPL-MCNC: 10.4 MG/DL (ref 8.6–10.5)
CASTS URNS MICRO: NORMAL
CHLORIDE SERPL-SCNC: 98 MMOL/L (ref 98–107)
CHOLEST SERPL-MCNC: 154 MG/DL (ref 0–200)
CO2 SERPL-SCNC: 31.4 MMOL/L (ref 22–29)
COLOR UR: YELLOW
CREAT SERPL-MCNC: 3.54 MG/DL (ref 0.76–1.27)
EGFRCR SERPLBLD CKD-EPI 2021: 17.4 ML/MIN/1.73
EOSINOPHIL # BLD AUTO: 0.94 10*3/MM3 (ref 0–0.4)
EOSINOPHIL NFR BLD AUTO: 12.1 % (ref 0.3–6.2)
EPI CELLS #/AREA URNS HPF: NORMAL /HPF
ERYTHROCYTE [DISTWIDTH] IN BLOOD BY AUTOMATED COUNT: 12.9 % (ref 12.3–15.4)
EXPIRATION DATE: ABNORMAL
GLOBULIN SER CALC-MCNC: 2.4 GM/DL
GLUCOSE SERPL-MCNC: 76 MG/DL (ref 65–99)
GLUCOSE UR QL STRIP: ABNORMAL
HBA1C MFR BLD: 7.4 %
HCT VFR BLD AUTO: 34.1 % (ref 37.5–51)
HDLC SERPL-MCNC: 43 MG/DL (ref 40–60)
HGB BLD-MCNC: 11.4 G/DL (ref 13–17.7)
HGB UR QL STRIP: NEGATIVE
IMM GRANULOCYTES # BLD AUTO: 0.03 10*3/MM3 (ref 0–0.05)
IMM GRANULOCYTES NFR BLD AUTO: 0.4 % (ref 0–0.5)
KETONES UR QL STRIP: NEGATIVE
LDLC SERPL CALC-MCNC: 86 MG/DL (ref 0–100)
LEUKOCYTE ESTERASE UR QL STRIP: NEGATIVE
LYMPHOCYTES # BLD AUTO: 1.27 10*3/MM3 (ref 0.7–3.1)
LYMPHOCYTES NFR BLD AUTO: 16.3 % (ref 19.6–45.3)
Lab: ABNORMAL
MCH RBC QN AUTO: 29.8 PG (ref 26.6–33)
MCHC RBC AUTO-ENTMCNC: 33.4 G/DL (ref 31.5–35.7)
MCV RBC AUTO: 89 FL (ref 79–97)
MONOCYTES # BLD AUTO: 0.72 10*3/MM3 (ref 0.1–0.9)
MONOCYTES NFR BLD AUTO: 9.3 % (ref 5–12)
NEUTROPHILS # BLD AUTO: 4.76 10*3/MM3 (ref 1.7–7)
NEUTROPHILS NFR BLD AUTO: 61.1 % (ref 42.7–76)
NITRITE UR QL STRIP: NEGATIVE
NRBC BLD AUTO-RTO: 0 /100 WBC (ref 0–0.2)
PH UR STRIP: 6.5 [PH] (ref 5–8)
PLATELET # BLD AUTO: 263 10*3/MM3 (ref 140–450)
POTASSIUM SERPL-SCNC: 3.8 MMOL/L (ref 3.5–5.2)
PROT SERPL-MCNC: 7.1 G/DL (ref 6–8.5)
PROT UR QL STRIP: ABNORMAL
RBC # BLD AUTO: 3.83 10*6/MM3 (ref 4.14–5.8)
RBC #/AREA URNS HPF: NORMAL /HPF
SODIUM SERPL-SCNC: 142 MMOL/L (ref 136–145)
SP GR UR STRIP: 1.01 (ref 1–1.03)
TRIGL SERPL-MCNC: 140 MG/DL (ref 0–150)
URATE SERPL-MCNC: 5.3 MG/DL (ref 3.4–7)
UROBILINOGEN UR STRIP-MCNC: ABNORMAL MG/DL
VLDLC SERPL CALC-MCNC: 25 MG/DL (ref 5–40)
WBC # BLD AUTO: 7.78 10*3/MM3 (ref 3.4–10.8)
WBC #/AREA URNS HPF: NORMAL /HPF

## 2023-01-12 PROCEDURE — 3051F HG A1C>EQUAL 7.0%<8.0%: CPT | Performed by: NURSE PRACTITIONER

## 2023-01-12 PROCEDURE — 83036 HEMOGLOBIN GLYCOSYLATED A1C: CPT | Performed by: NURSE PRACTITIONER

## 2023-01-12 PROCEDURE — 99214 OFFICE O/P EST MOD 30 MIN: CPT | Performed by: NURSE PRACTITIONER

## 2023-01-12 RX ORDER — HYDRALAZINE HYDROCHLORIDE 100 MG/1
1 TABLET, FILM COATED ORAL 3 TIMES DAILY
COMMUNITY
Start: 2023-01-03

## 2023-01-12 NOTE — PROGRESS NOTES
"Chief Complaint  Hypertension and Follow-up    Subjective        Javier Torres presents to [facility]  History of Present Illness  This is a 72-year-old male patient being seen today for hypertension, CKD, diabetes mellitus.  He is here today with his cousin who is POA.  He has recently had right fistula placed preparing for dialysis.  He is being followed closely by Dr. Tena with follow-up on February 2, 2023.  He is diabetic and is being followed Dr. Moreno with last hemoglobin A1c 7.4.  He has not had a recent eye exam and plans to get that scheduled.  He does have hypertension and has not had medications today.  He denies any chest pain or shortness of breath.  He does have lower extremity edema that reports is stable.  He is currently on Bumex and metolazone.  He is following a low-salt diet.  He is on Lipitor for hyperlipidemia with no adverse effects.      Objective   Vital Signs:  /72   Pulse 85   Temp 97.7 °F (36.5 °C)   Ht 182.9 cm (72.01\")   Wt 117 kg (258 lb 9.6 oz)   SpO2 98%   BMI 35.06 kg/m²   Estimated body mass index is 35.06 kg/m² as calculated from the following:    Height as of this encounter: 182.9 cm (72.01\").    Weight as of this encounter: 117 kg (258 lb 9.6 oz).             Physical Exam  Vitals and nursing note reviewed.   HENT:      Head: Normocephalic.      Nose: Nose normal.   Eyes:      Pupils: Pupils are equal, round, and reactive to light.   Cardiovascular:      Rate and Rhythm: Normal rate and regular rhythm.      Pulses: Normal pulses.      Heart sounds: Normal heart sounds.   Pulmonary:      Effort: Pulmonary effort is normal. No respiratory distress.      Breath sounds: Normal breath sounds. No wheezing or rales.   Abdominal:      General: Bowel sounds are normal. There is no distension.      Tenderness: There is no abdominal tenderness.   Musculoskeletal:         General: No swelling.      Cervical back: Neck supple.      Right lower " leg: No edema.      Left lower leg: No edema.   Skin:     General: Skin is warm and dry.   Neurological:      Mental Status: He is alert and oriented to person, place, and time.   Psychiatric:         Mood and Affect: Mood normal.        Result Review :                   Assessment and Plan   Diagnoses and all orders for this visit:    1. Type 2 diabetes with nephropathy (HCC) (Primary)  -     POCT glycated hemoglobin, total  -     MicroAlbumin, Urine, Random - Urine, Clean Catch    2. Hyperlipidemia, unspecified hyperlipidemia type  -     Comprehensive Metabolic Panel  -     Lipid Panel    3. Screening for deficiency anemia  -     CBC & Differential    4. Essential hypertension  -     Urinalysis With Microscopic - Urine, Clean Catch    5. Chronic gout due to renal impairment of multiple sites without tophus  -     Uric Acid    6. Mild intellectual disability  -     Ambulatory Referral to Social Care Services (Amb Case Mgmt)    Other orders  -     Microscopic Examination -  -     Uric Acid    He has not had his blood pressure medicine today and blood pressure is slightly elevated.  They will keep an eye on blood pressure at home.  I will obtain blood work today.  Cousin is concerned about care for him in the future and would like discussion on plans.  She has tried to get him in an adult  in the past but that was not doable due to income.  I will put a referral in for social work to discuss this further in detail with her.         Follow Up   Return in about 6 months (around 7/12/2023).  Patient was given instructions and counseling regarding his condition or for health maintenance advice. Please see specific information pulled into the AVS if appropriate.

## 2023-01-13 LAB — MICROALBUMIN UR-MCNC: 56.9 UG/ML

## 2023-01-18 DIAGNOSIS — M10.9 GOUT OF FOOT, UNSPECIFIED CAUSE, UNSPECIFIED CHRONICITY, UNSPECIFIED LATERALITY: ICD-10-CM

## 2023-01-19 ENCOUNTER — PATIENT OUTREACH (OUTPATIENT)
Dept: CASE MANAGEMENT | Facility: OTHER | Age: 73
End: 2023-01-19
Payer: MEDICARE

## 2023-01-19 RX ORDER — RIVAROXABAN 20 MG/1
TABLET, FILM COATED ORAL
Qty: 90 TABLET | Refills: 1 | Status: SHIPPED | OUTPATIENT
Start: 2023-01-19

## 2023-01-19 NOTE — OUTREACH NOTE
Social Work Assessment  Questions/Answers    Flowsheet Row Most Recent Value   Referral Source physician   Reason for Consult community resources, facility placement   Preferred Language English   Advance Care Planning Reviewed present on chart   People in Home alone   Unique Family Situation Lives across the street from cousin/HEIDI Sepulveda   Current Living Arrangements home   Primary Care Provided by self   Provides Primary Care For no one   Quality of Family Relationships helpful, supportive   Employment Status retired   Source of Income social security   Usual Activity Tolerance fair   Current Activity Tolerance fair        SDOH updated and reviewed with the patient during this program:  Financial Resource Strain: Low Risk    • Difficulty of Paying Living Expenses: Not very hard      Food Insecurity: No Food Insecurity   • Worried About Running Out of Food in the Last Year: Never true   • Ran Out of Food in the Last Year: Never true      Transportation Needs: No Transportation Needs   • Lack of Transportation (Medical): No   • Lack of Transportation (Non-Medical): No      Housing Stability: Low Risk    • Unable to Pay for Housing in the Last Year: No   • Number of Places Lived in the Last Year: 1   • Unstable Housing in the Last Year: No      Continuing Care   [facility]    [address] [facility] [address]    Phone: [phone]    Resource for: Financial Resource Strain   [facility]    [address] [address]    Phone: [phone]     Patient Outreach    MSW outreach to patient's caregiver Coco to discuss options for future living placement for patient. Coco discusses that she is patient's caregiver currently and there are no immediate needs at this time. Coco would like information regarding options for the future if she needs additional assistance or is unable to care for patient. MSW and Coco discussed in home caregiver options  through Medicaid waiver services and discussed application process that will take place before services can begin. MSW also discussed contacting [facility] when the patient is no longer able to stay at home with caregiver. Patient's POA states she will utilize these resources when needed in the future. No financial, housing, transportation or food resources needed at this time.  Patient's POA Coco was provided with MSW contact information and will follow-up as needed.     CHARU VILA -   Ambulatory Case Management    1/19/2023, 11:12 EST

## 2023-02-09 DIAGNOSIS — M10.9 GOUT OF FOOT, UNSPECIFIED CAUSE, UNSPECIFIED CHRONICITY, UNSPECIFIED LATERALITY: ICD-10-CM

## 2023-02-09 RX ORDER — ATORVASTATIN CALCIUM 20 MG/1
TABLET, FILM COATED ORAL
Qty: 90 TABLET | Refills: 1 | Status: SHIPPED | OUTPATIENT
Start: 2023-02-09

## 2023-02-10 RX ORDER — FAMOTIDINE 20 MG/1
TABLET, FILM COATED ORAL
Qty: 180 TABLET | Refills: 3 | Status: SHIPPED | OUTPATIENT
Start: 2023-02-10

## 2023-03-31 RX ORDER — DOXYCYCLINE HYCLATE 50 MG/1
CAPSULE, GELATIN COATED ORAL
Qty: 30 TABLET | Refills: 3 | OUTPATIENT
Start: 2023-03-31

## 2023-03-31 RX ORDER — AMLODIPINE BESYLATE 5 MG/1
TABLET ORAL
Qty: 90 TABLET | Refills: 1 | Status: SHIPPED | OUTPATIENT
Start: 2023-03-31

## 2023-05-11 ENCOUNTER — LAB (OUTPATIENT)
Dept: LAB | Facility: HOSPITAL | Age: 73
End: 2023-05-11
Payer: MEDICARE

## 2023-05-11 ENCOUNTER — HOSPITAL ENCOUNTER (OUTPATIENT)
Dept: GENERAL RADIOLOGY | Facility: HOSPITAL | Age: 73
Discharge: HOME | End: 2023-05-11
Payer: MEDICARE

## 2023-05-11 ENCOUNTER — TRANSCRIBE ORDERS (OUTPATIENT)
Dept: ADMINISTRATIVE | Facility: HOSPITAL | Age: 73
End: 2023-05-11
Payer: MEDICARE

## 2023-05-11 DIAGNOSIS — R60.0 LOCALIZED EDEMA: ICD-10-CM

## 2023-05-11 DIAGNOSIS — I12.9 HYPERTENSIVE NEPHROPATHY: ICD-10-CM

## 2023-05-11 DIAGNOSIS — N18.6 END STAGE RENAL DISEASE: Primary | ICD-10-CM

## 2023-05-11 DIAGNOSIS — E87.6 HYPOPOTASSEMIA: ICD-10-CM

## 2023-05-11 DIAGNOSIS — I12.9 HYPERTENSIVE RENAL DISEASE, STAGE 1 THROUGH STAGE 4 OR UNSPECIFIED CHRONIC KIDNEY DISEASE: ICD-10-CM

## 2023-05-11 DIAGNOSIS — N18.6 END STAGE RENAL DISEASE: ICD-10-CM

## 2023-05-11 DIAGNOSIS — R60.0 EDEMA OF LOWER EXTREMITY: ICD-10-CM

## 2023-05-11 DIAGNOSIS — E87.6 HYPOKALEMIA: ICD-10-CM

## 2023-05-11 LAB
HBV CORE IGM SERPL QL IA: NORMAL
HBV SURFACE AB SER RIA-ACNC: NORMAL
HBV SURFACE AG SERPL QL IA: NORMAL

## 2023-05-11 PROCEDURE — 36415 COLL VENOUS BLD VENIPUNCTURE: CPT

## 2023-05-11 PROCEDURE — 86705 HEP B CORE ANTIBODY IGM: CPT

## 2023-05-11 PROCEDURE — 86706 HEP B SURFACE ANTIBODY: CPT

## 2023-05-11 PROCEDURE — 71046 X-RAY EXAM CHEST 2 VIEWS: CPT

## 2023-05-11 PROCEDURE — 87340 HEPATITIS B SURFACE AG IA: CPT

## 2023-06-08 ENCOUNTER — DOCUMENTATION (OUTPATIENT)
Dept: NEPHROLOGY | Facility: HOSPITAL | Age: 73
End: 2023-06-08
Payer: MEDICARE

## 2023-06-08 PROBLEM — D62 ABLA (ACUTE BLOOD LOSS ANEMIA): Status: ACTIVE | Noted: 2023-01-01

## 2023-06-08 PROBLEM — N40.0 BPH (BENIGN PROSTATIC HYPERPLASIA): Status: ACTIVE | Noted: 2023-01-01

## 2023-06-08 PROBLEM — R79.89 TROPONIN I ABOVE REFERENCE RANGE: Status: ACTIVE | Noted: 2023-01-01

## 2023-06-08 PROBLEM — R79.89 TROPONIN LEVEL ELEVATED: Status: ACTIVE | Noted: 2023-01-01

## 2023-06-08 PROBLEM — R77.8 TROPONIN LEVEL ELEVATED: Status: ACTIVE | Noted: 2023-01-01

## 2023-06-08 PROBLEM — R77.8 TROPONIN I ABOVE REFERENCE RANGE: Status: ACTIVE | Noted: 2023-06-08

## 2023-06-16 ENCOUNTER — TELEPHONE (OUTPATIENT)
Dept: CARDIOLOGY | Facility: CLINIC | Age: 73
End: 2023-06-16
Payer: MEDICARE

## 2023-06-16 NOTE — TELEPHONE ENCOUNTER
PATIENT IS SCHEDULED ----- Message from Nataliya Moeller MD sent at 6/15/2023  9:52 AM EDT -----  Follow-up with Carmencita or me in 1 month.

## 2023-06-20 PROBLEM — R09.02 HYPOXIA: Status: ACTIVE | Noted: 2023-01-01

## 2023-06-20 PROBLEM — N18.6 ESRD (END STAGE RENAL DISEASE): Status: ACTIVE | Noted: 2023-01-01

## 2023-06-20 PROBLEM — J81.0 ACUTE PULMONARY EDEMA: Status: ACTIVE | Noted: 2023-01-01

## 2023-07-03 PROBLEM — G93.41 METABOLIC ENCEPHALOPATHY: Status: ACTIVE | Noted: 2023-01-01

## 2023-07-03 PROBLEM — I48.0 PAROXYSMAL A-FIB: Status: ACTIVE | Noted: 2023-01-01

## 2023-07-03 PROBLEM — F03.90 DEMENTIA: Status: ACTIVE | Noted: 2023-01-01

## 2023-07-03 PROBLEM — D63.8 ANEMIA, CHRONIC DISEASE: Status: ACTIVE | Noted: 2023-01-01

## 2023-07-03 PROBLEM — E87.1 HYPONATREMIA: Status: ACTIVE | Noted: 2023-01-01

## 2023-07-03 PROBLEM — F81.9 INTELLECTUAL DELAY: Status: ACTIVE | Noted: 2023-01-01

## 2023-07-03 PROBLEM — R74.01 TRANSAMINITIS: Status: ACTIVE | Noted: 2023-01-01

## 2023-07-12 PROBLEM — N18.6 END STAGE RENAL DISEASE: Status: ACTIVE | Noted: 2023-01-01

## 2023-07-12 NOTE — CONSULTS
HSB Admission: 7/11/23  [facility] ID: [id number]  Diagnosis: ESRD [N18.6], metabolic encephalopathy [93.41], anemia [D63.8], AFIB [I48.0], dementia [F03.90], DM2 [E11.21], HTN [I10]  Symptom Management: pain/anxiety/dyspnea    Met with Coco [facility]/POA at bedside. Explanation of services provided with a focus on HSB ([facility]). Answered all questions, discussed medications, symptom management needs, and goals of care. [facility] services selected. [facility] consent forms completed and signed by POA. [facility] information provided and encouraged to reach out with any needs.    Benefit change completed and copy left with Chhaya Jordan CCP. [facility] daily visits will begin tomorrow 7/12/23.    Please call with any questions, concerns, or change in patient status. Thank you for allowing us the opportunity to participate in the care of this patient/family.    Catrachita Simons, RN, BSN  [facility] Admissions  [phone]

## 2023-07-12 NOTE — PROGRESS NOTES
Case Management Discharge Note      Final Note: The patient  on 23 @ 00:15. BVaibhav Jordan RN, CCP.         Selected Continued Care - Discharged on 2023 Admission date: 2023 - Discharge disposition:       Destination Coordination complete.      Service Provider Selected Services Address Phone Fax Patient Preferred    [facility] [address], [facility] [address] [phone] --              Durable Medical Equipment    No services have been selected for the patient.                Dialysis/Infusion    No services have been selected for the patient.                Home Medical Care    No services have been selected for the patient.                Therapy    No services have been selected for the patient.                Community Resources    No services have been selected for the patient.                Community & DME    No services have been selected for the patient.                    Selected Continued Care - Episodes Includes continued care and service providers with selected services from the active episodes listed below      High Risk Care Management Episode start date: 2023 (Paused)   There are no active outsourced providers for this episode.                 Selected Continued Care - Prior Encounters Includes continued care and service providers with selected services from prior encounters from 2023 to 2023      Discharged on 2023 Admission date: 7/3/2023 - Discharge disposition: Hospice/Medical Facility ([facility])      Destination       Service Provider Selected Services Address Phone Fax Patient Preferred    [facility] [address], [facility] [address] [phone] --                      Discharged on 2023 Admission date: 2023 - Discharge disposition: Skilled Nursing Facility (DC - External)      Destination       Service Provider  Selected Services Address Phone Fax Patient Preferred    [facility] [address] [place] [id number] [phone] --                               Final Discharge Disposition Code: 41 -  in medical facility

## 2023-07-30 NOTE — DISCHARGE SUMMARY
[facility]    Date of Discharge:  The patient  on 23 @ 00:15.      PCP: Ileana Wolf APRN    Discharge Diagnosis:   Active Hospital Problems    Diagnosis  POA    End stage renal disease [N18.6]  Yes      Resolved Hospital Problems   No resolved problems to display.          Consults       Date and Time Order Name Status Description    2023  1:13 PM Inpatient Vascular Surgery Consult Completed     2023 11:54 AM Inpatient Infectious Diseases Consult Completed     2023  5:06 PM Inpatient General Surgery Consult Completed     2023 12:42 PM Inpatient Neurology Consult General Completed     7/3/2023 11:10 PM Inpatient Nephrology Consult Completed     2023 12:24 PM Inpatient Psychiatrist Consult Completed     6/10/2023  2:41 PM Gastroenterology (on-call MD unless specified) Completed     2023 12:55 AM Inpatient Cardiology Consult Completed     2023  7:19 PM Nephrology (on -call MD unless specified) Completed           Hospital Course  73 y.o. male  with a history of dementia and intellectual disability and ESRD on HD sent in for altered mental status.  He was slightly hyponatremic on arrival but otherwise no clear cause for his AMS.  Renal was following and managing hemodialysis.  He was unable to urinate in the ER and they were unable to cath him.  The floor nurses were also unable to cath him so urology was consulted, they diagnosed him with phimosis and placed a Soto and got more than a 1L out so possibly his altered mental status was delirium secondary to urinary retention.   He was also anemic to 6.6 and is receiving a unit of blood today but no obvious signs of bleeding, seems to be more anemia of chronic disease and a slow drift downward.  Family has decided not to pursue any further aggressive treatment measures, they have been transitioned the patient to palliative care, today he will be admitted to hospice.   Time spent is more than 30 minutes.       There is no height or weight on file to calculate BMI.      Disposition:      Corby Vogt MD  [facility]  23    Discharge time spent greater than 30 minutes.

## (undated) DEVICE — SUT PROLN 5/0 RB1 D/A 36IN 8556H

## (undated) DEVICE — TUBING, SUCTION, 1/4" X 20', STRAIGHT: Brand: MEDLINE INDUSTRIES, INC.

## (undated) DEVICE — ANTIBACTERIAL UNDYED BRAIDED (POLYGLACTIN 910), SYNTHETIC ABSORBABLE SUTURE: Brand: COATED VICRYL

## (undated) DEVICE — PENCL ES MEGADINE EZ/CLEAN BUTN W/HOLSTR 10FT

## (undated) DEVICE — SUT SILK 3/0 TIES 18IN A184H

## (undated) DEVICE — SUT SILK 3/0 SH CR5 18IN C0135

## (undated) DEVICE — PATIENT RETURN ELECTRODE, SINGLE-USE, CONTACT QUALITY MONITORING, ADULT, WITH 9FT CORD, FOR PATIENTS WEIGING OVER 33LBS. (15KG): Brand: MEGADYNE

## (undated) DEVICE — SUT SILK 2/0 TIES 18IN A185H

## (undated) DEVICE — GLV SURG SENSICARE PI MIC PF SZ7.5 LF STRL

## (undated) DEVICE — BG ISOL DRWSTR INVISISHIELD 20X20IN

## (undated) DEVICE — PK AV FISTL/SHNT 40

## (undated) DEVICE — SOL NS 500ML

## (undated) DEVICE — SUT PROLN 6/0 BV1 D/A 30IN 8709H

## (undated) DEVICE — SUT SILK 4/0 TIES 18IN A183H